# Patient Record
Sex: FEMALE | Race: WHITE | Employment: FULL TIME | URBAN - METROPOLITAN AREA
[De-identification: names, ages, dates, MRNs, and addresses within clinical notes are randomized per-mention and may not be internally consistent; named-entity substitution may affect disease eponyms.]

---

## 2017-05-10 ENCOUNTER — OFFICE VISIT (OUTPATIENT)
Dept: FAMILY MEDICINE CLINIC | Age: 42
End: 2017-05-10

## 2017-05-10 VITALS
OXYGEN SATURATION: 98 % | DIASTOLIC BLOOD PRESSURE: 76 MMHG | SYSTOLIC BLOOD PRESSURE: 128 MMHG | HEIGHT: 60 IN | BODY MASS INDEX: 40.44 KG/M2 | HEART RATE: 116 BPM | WEIGHT: 206 LBS

## 2017-05-10 DIAGNOSIS — Z00.00 WELL ADULT EXAM: Primary | ICD-10-CM

## 2017-05-10 DIAGNOSIS — K64.9 HEMORRHOIDS, UNSPECIFIED HEMORRHOID TYPE: ICD-10-CM

## 2017-05-10 DIAGNOSIS — Z82.49 FAMILY HISTORY OF PREMATURE CAD: ICD-10-CM

## 2017-05-10 PROCEDURE — 99395 PREV VISIT EST AGE 18-39: CPT | Performed by: FAMILY MEDICINE

## 2017-09-22 ENCOUNTER — TELEPHONE (OUTPATIENT)
Dept: FAMILY MEDICINE CLINIC | Age: 42
End: 2017-09-22

## 2018-01-05 ENCOUNTER — OFFICE VISIT (OUTPATIENT)
Dept: FAMILY MEDICINE CLINIC | Age: 43
End: 2018-01-05

## 2018-01-05 VITALS
DIASTOLIC BLOOD PRESSURE: 80 MMHG | OXYGEN SATURATION: 98 % | HEART RATE: 96 BPM | WEIGHT: 204 LBS | BODY MASS INDEX: 40.51 KG/M2 | SYSTOLIC BLOOD PRESSURE: 124 MMHG | TEMPERATURE: 97.8 F

## 2018-01-05 DIAGNOSIS — B96.89 ACUTE BACTERIAL SINUSITIS: Primary | ICD-10-CM

## 2018-01-05 DIAGNOSIS — F17.200 TOBACCO USE DISORDER: ICD-10-CM

## 2018-01-05 DIAGNOSIS — J01.90 ACUTE BACTERIAL SINUSITIS: Primary | ICD-10-CM

## 2018-01-05 DIAGNOSIS — J40 BRONCHITIS: ICD-10-CM

## 2018-01-05 PROCEDURE — G8427 DOCREV CUR MEDS BY ELIG CLIN: HCPCS | Performed by: FAMILY MEDICINE

## 2018-01-05 PROCEDURE — G8484 FLU IMMUNIZE NO ADMIN: HCPCS | Performed by: FAMILY MEDICINE

## 2018-01-05 PROCEDURE — 99213 OFFICE O/P EST LOW 20 MIN: CPT | Performed by: FAMILY MEDICINE

## 2018-01-05 PROCEDURE — G8417 CALC BMI ABV UP PARAM F/U: HCPCS | Performed by: FAMILY MEDICINE

## 2018-01-05 PROCEDURE — 4004F PT TOBACCO SCREEN RCVD TLK: CPT | Performed by: FAMILY MEDICINE

## 2018-01-05 RX ORDER — AMOXICILLIN 500 MG/1
1000 TABLET, FILM COATED ORAL 2 TIMES DAILY
Qty: 40 TABLET | Refills: 0 | Status: SHIPPED | OUTPATIENT
Start: 2018-01-05 | End: 2018-01-15

## 2018-01-26 LAB
HCT VFR BLD CALC: 41.9 % (ref 36–48)
HEMOGLOBIN: 13.9 G/DL (ref 12–16)
MCH RBC QN AUTO: 30.7 PG (ref 26–34)
MCHC RBC AUTO-ENTMCNC: 33.3 G/DL (ref 31–36)
MCV RBC AUTO: 92.1 FL (ref 80–100)
PDW BLD-RTO: 14.3 % (ref 12.4–15.4)
PLATELET # BLD: 281 K/UL (ref 135–450)
PMV BLD AUTO: 8.3 FL (ref 5–10.5)
RBC # BLD: 4.55 M/UL (ref 4–5.2)
TSH SERPL DL<=0.05 MIU/L-ACNC: 1.3 UIU/ML (ref 0.27–4.2)
WBC # BLD: 7.4 K/UL (ref 4–11)

## 2018-02-01 LAB
HPV COMMENT: NORMAL
HPV TYPE 16: NOT DETECTED
HPV TYPE 18: NOT DETECTED
HPVOH (OTHER TYPES): NOT DETECTED

## 2018-03-26 ENCOUNTER — HOSPITAL ENCOUNTER (OUTPATIENT)
Dept: OTHER | Age: 43
Discharge: HOME | End: 2018-03-26
Attending: OBSTETRICS & GYNECOLOGY | Admitting: OBSTETRICS & GYNECOLOGY

## 2018-03-26 LAB
A/G RATIO: 1.6 (ref 1.1–2.2)
ABO/RH: NORMAL
ALBUMIN SERPL-MCNC: 4 G/DL (ref 3.4–5)
ALP BLD-CCNC: 60 U/L (ref 40–129)
ALT SERPL-CCNC: 11 U/L (ref 10–40)
ANION GAP SERPL CALCULATED.3IONS-SCNC: 12 MMOL/L (ref 3–16)
ANTIBODY SCREEN: NORMAL
AST SERPL-CCNC: 11 U/L (ref 15–37)
BASOPHILS ABSOLUTE: 0.1 K/UL (ref 0–0.2)
BASOPHILS RELATIVE PERCENT: 0.8 %
BILIRUB SERPL-MCNC: 0.3 MG/DL (ref 0–1)
BUN BLDV-MCNC: 11 MG/DL (ref 7–20)
CALCIUM SERPL-MCNC: 8.8 MG/DL (ref 8.3–10.6)
CHLORIDE BLD-SCNC: 101 MMOL/L (ref 99–110)
CO2: 27 MMOL/L (ref 21–32)
CREAT SERPL-MCNC: <0.5 MG/DL (ref 0.6–1.1)
EKG ATRIAL RATE: 73 BPM
EKG DIAGNOSIS: NORMAL
EKG P AXIS: 37 DEGREES
EKG P-R INTERVAL: 154 MS
EKG Q-T INTERVAL: 386 MS
EKG QRS DURATION: 82 MS
EKG QTC CALCULATION (BAZETT): 425 MS
EKG R AXIS: 27 DEGREES
EKG T AXIS: 33 DEGREES
EKG VENTRICULAR RATE: 73 BPM
EOSINOPHILS ABSOLUTE: 0.2 K/UL (ref 0–0.6)
EOSINOPHILS RELATIVE PERCENT: 1.7 %
GFR AFRICAN AMERICAN: >60
GFR NON-AFRICAN AMERICAN: >60
GLOBULIN: 2.5 G/DL
GLUCOSE BLD-MCNC: 83 MG/DL (ref 70–99)
HCT VFR BLD CALC: 40.6 % (ref 36–48)
HEMOGLOBIN: 13.6 G/DL (ref 12–16)
LYMPHOCYTES ABSOLUTE: 2.1 K/UL (ref 1–5.1)
LYMPHOCYTES RELATIVE PERCENT: 23.1 %
MCH RBC QN AUTO: 31 PG (ref 26–34)
MCHC RBC AUTO-ENTMCNC: 33.6 G/DL (ref 31–36)
MCV RBC AUTO: 92.2 FL (ref 80–100)
MONOCYTES ABSOLUTE: 0.6 K/UL (ref 0–1.3)
MONOCYTES RELATIVE PERCENT: 7 %
NEUTROPHILS ABSOLUTE: 6.2 K/UL (ref 1.7–7.7)
NEUTROPHILS RELATIVE PERCENT: 67.4 %
PDW BLD-RTO: 14.2 % (ref 12.4–15.4)
PLATELET # BLD: 278 K/UL (ref 135–450)
PMV BLD AUTO: 8.8 FL (ref 5–10.5)
POTASSIUM SERPL-SCNC: 4.2 MMOL/L (ref 3.5–5.1)
RBC # BLD: 4.4 M/UL (ref 4–5.2)
SODIUM BLD-SCNC: 140 MMOL/L (ref 136–145)
TOTAL PROTEIN: 6.5 G/DL (ref 6.4–8.2)
WBC # BLD: 9.2 K/UL (ref 4–11)

## 2018-03-26 PROCEDURE — 93010 ELECTROCARDIOGRAM REPORT: CPT | Performed by: INTERNAL MEDICINE

## 2018-04-05 PROBLEM — N94.89 ADNEXAL MASS: Status: ACTIVE | Noted: 2018-04-05

## 2018-08-28 ENCOUNTER — TELEPHONE (OUTPATIENT)
Dept: FAMILY MEDICINE CLINIC | Age: 43
End: 2018-08-28

## 2018-08-29 ENCOUNTER — OFFICE VISIT (OUTPATIENT)
Dept: FAMILY MEDICINE CLINIC | Age: 43
End: 2018-08-29

## 2018-08-29 VITALS
TEMPERATURE: 98.4 F | SYSTOLIC BLOOD PRESSURE: 110 MMHG | OXYGEN SATURATION: 98 % | HEART RATE: 88 BPM | DIASTOLIC BLOOD PRESSURE: 80 MMHG | WEIGHT: 192.4 LBS | BODY MASS INDEX: 38.86 KG/M2

## 2018-08-29 DIAGNOSIS — J40 BRONCHITIS: Primary | ICD-10-CM

## 2018-08-29 DIAGNOSIS — F17.200 TOBACCO USE DISORDER: ICD-10-CM

## 2018-08-29 PROCEDURE — G8417 CALC BMI ABV UP PARAM F/U: HCPCS | Performed by: FAMILY MEDICINE

## 2018-08-29 PROCEDURE — 4004F PT TOBACCO SCREEN RCVD TLK: CPT | Performed by: FAMILY MEDICINE

## 2018-08-29 PROCEDURE — 99213 OFFICE O/P EST LOW 20 MIN: CPT | Performed by: FAMILY MEDICINE

## 2018-08-29 PROCEDURE — G8427 DOCREV CUR MEDS BY ELIG CLIN: HCPCS | Performed by: FAMILY MEDICINE

## 2018-08-29 RX ORDER — NAPROXEN SODIUM 220 MG
2 TABLET ORAL 2 TIMES DAILY PRN
COMMUNITY

## 2018-08-29 RX ORDER — AZITHROMYCIN 250 MG/1
TABLET, FILM COATED ORAL
Qty: 1 PACKET | Refills: 0 | Status: SHIPPED | OUTPATIENT
Start: 2018-08-29 | End: 2018-09-02

## 2018-08-29 RX ORDER — GUAIFENESIN, PSEUDOEPHEDRINE HYDROCHLORIDE 600; 60 MG/1; MG/1
1 TABLET, EXTENDED RELEASE ORAL EVERY 12 HOURS
Qty: 20 TABLET | Refills: 1 | Status: SHIPPED | OUTPATIENT
Start: 2018-08-29 | End: 2018-09-08

## 2018-08-29 ASSESSMENT — ENCOUNTER SYMPTOMS
RHINORRHEA: 1
NAUSEA: 1
COUGH: 1
SHORTNESS OF BREATH: 1
SORE THROAT: 1
DIARRHEA: 1

## 2018-08-29 NOTE — PROGRESS NOTES
SUBJECTIVE:    Maggy Grubbs is a 38 y.o. female who presents for a follow up visit. Chief Complaint   Patient presents with    Cough     Pt presents today with head congestion, nasal drainage is a yellow color, productive cough with thick mucous, and sore throat. Symptoms started since Monday night. Cough   This is a recurrent problem. The current episode started 1 to 4 weeks ago. The problem has been gradually worsening. The cough is productive of purulent sputum. Associated symptoms include chest pain, ear pain, myalgias, nasal congestion, postnasal drip, rhinorrhea, a sore throat, shortness of breath and sweats. Pertinent negatives include no fever. Risk factors for lung disease include smoking/tobacco exposure. She has tried OTC cough suppressant (mucinex D) for the symptoms. The treatment provided mild relief. Patient's medications, allergies, past medical, surgical, social and family histories were reviewed and updated as appropriate.      Past Medical History:   Diagnosis Date    GERD (gastroesophageal reflux disease)     H/O myringotomy     w/tube placement     Sleep apnea syndrome     no c-pap    Tobacco use disorder      Past Surgical History:   Procedure Laterality Date    ADENOIDECTOMY      DILATION AND CURETTAGE OF UTERUS  11/30/11    DILATION AND CURETTAGE OF UTERUS  5/24/12    HYSTERECTOMY, TOTAL ABDOMINAL  04/05/2018    total hysterectomy, BSO    TYMPANOSTOMY TUBE PLACEMENT       Family History   Problem Relation Age of Onset    Heart Disease Father     High Blood Pressure Mother     High Cholesterol Mother     Cancer Other     Alcohol Abuse Other     Stroke Other     High Blood Pressure Other     Lung Cancer Maternal Grandmother         +smoker    Cancer Maternal Grandmother         lung, smoker     Social History     Social History    Marital status:      Spouse name: N/A    Number of children: 2    Years of education: N/A     Occupational History    Normocephalic and atraumatic. Right Ear: External ear normal.   Left Ear: External ear normal.   Nose: Rhinorrhea present. Right sinus exhibits no maxillary sinus tenderness and no frontal sinus tenderness. Left sinus exhibits no maxillary sinus tenderness and no frontal sinus tenderness. Mouth/Throat: Oropharynx is clear and moist.   Eyes: Conjunctivae and EOM are normal. Right eye exhibits no discharge. Neck: Normal range of motion. Neck supple. No JVD present. No tracheal deviation present. No thyromegaly present. Cardiovascular: Normal rate, regular rhythm and normal heart sounds. Pulmonary/Chest: Effort normal and breath sounds normal. No respiratory distress. She has no rales. Lymphadenopathy:     She has no cervical adenopathy. Neurological: She is alert and oriented to person, place, and time. Skin: Skin is warm and dry. Psychiatric: She has a normal mood and affect. ASSESSMENT/PLAN:    Claudetta Doing was seen today for cough. Diagnoses and all orders for this visit:    Bronchitis  -     azithromycin (ZITHROMAX Z-MECCA) 250 MG tablet; Take 2 tablets (500 mg) on Day 1, and then take 1 tablet (250 mg) on days 2 through 5.  -     HYDROcodone-homatropine (HYCODAN) 5-1.5 MG/5ML syrup; Take 2.5 mLs by mouth every 6 hours as needed (cough) for up to 7 days. .  -     pseudoephedrine-guaiFENesin (MUCINEX D)  MG per extended release tablet; Take 1 tablet by mouth every 12 hours for 10 days    Tobacco use disorder    Encouraged to quit     Return if symptoms worsen or fail to improve. Please note portions of this note were completed with a voice recognition program.  Efforts were made to edit the dictations but occasionally words are mis-transcribed.

## 2018-08-30 ENCOUNTER — TELEPHONE (OUTPATIENT)
Dept: RHEUMATOLOGY | Age: 43
End: 2018-08-30

## 2018-08-30 NOTE — TELEPHONE ENCOUNTER
PA submitted for Mucinex D 60-600MG OR TB12 on CMM  Key: [id number] PA Case ID: [id number]     Pending review

## 2018-08-31 NOTE — TELEPHONE ENCOUNTER
PA DENIAL received for this medication. States this medication is not a covered benefit under patient's prescription plan. Denial attached. Please advise patient.

## 2019-08-20 ENCOUNTER — OFFICE VISIT (OUTPATIENT)
Dept: FAMILY MEDICINE CLINIC | Age: 44
End: 2019-08-20
Payer: COMMERCIAL

## 2019-08-20 VITALS
OXYGEN SATURATION: 98 % | DIASTOLIC BLOOD PRESSURE: 80 MMHG | BODY MASS INDEX: 41.61 KG/M2 | WEIGHT: 206 LBS | TEMPERATURE: 97.5 F | HEART RATE: 89 BPM | SYSTOLIC BLOOD PRESSURE: 130 MMHG

## 2019-08-20 DIAGNOSIS — B96.89 ACUTE BACTERIAL SINUSITIS: Primary | ICD-10-CM

## 2019-08-20 DIAGNOSIS — J01.90 ACUTE BACTERIAL SINUSITIS: Primary | ICD-10-CM

## 2019-08-20 PROCEDURE — 99213 OFFICE O/P EST LOW 20 MIN: CPT | Performed by: FAMILY MEDICINE

## 2019-08-20 PROCEDURE — 4004F PT TOBACCO SCREEN RCVD TLK: CPT | Performed by: FAMILY MEDICINE

## 2019-08-20 PROCEDURE — G8427 DOCREV CUR MEDS BY ELIG CLIN: HCPCS | Performed by: FAMILY MEDICINE

## 2019-08-20 PROCEDURE — G8417 CALC BMI ABV UP PARAM F/U: HCPCS | Performed by: FAMILY MEDICINE

## 2019-08-20 RX ORDER — GUAIFENESIN, PSEUDOEPHEDRINE HYDROCHLORIDE 600; 60 MG/1; MG/1
1 TABLET, EXTENDED RELEASE ORAL EVERY 12 HOURS
Qty: 20 TABLET | Refills: 1 | Status: SHIPPED | OUTPATIENT
Start: 2019-08-20 | End: 2019-08-30

## 2019-08-20 RX ORDER — CEFDINIR 300 MG/1
300 CAPSULE ORAL 2 TIMES DAILY
Qty: 20 CAPSULE | Refills: 0 | Status: SHIPPED | OUTPATIENT
Start: 2019-08-20 | End: 2019-08-30

## 2019-08-20 ASSESSMENT — ENCOUNTER SYMPTOMS
RHINORRHEA: 1
SORE THROAT: 1
COUGH: 1

## 2019-11-21 ENCOUNTER — HOSPITAL ENCOUNTER (OUTPATIENT)
Dept: CT IMAGING | Age: 44
Discharge: HOME | End: 2019-11-21
Payer: COMMERCIAL

## 2019-11-21 ENCOUNTER — OFFICE VISIT (OUTPATIENT)
Dept: FAMILY MEDICINE CLINIC | Age: 44
End: 2019-11-21
Payer: COMMERCIAL

## 2019-11-21 ENCOUNTER — TELEPHONE (OUTPATIENT)
Dept: FAMILY MEDICINE CLINIC | Age: 44
End: 2019-11-21

## 2019-11-21 VITALS
BODY MASS INDEX: 41.2 KG/M2 | OXYGEN SATURATION: 98 % | DIASTOLIC BLOOD PRESSURE: 78 MMHG | HEART RATE: 92 BPM | WEIGHT: 204 LBS | SYSTOLIC BLOOD PRESSURE: 110 MMHG | TEMPERATURE: 98.1 F

## 2019-11-21 DIAGNOSIS — R42 DIZZINESS: ICD-10-CM

## 2019-11-21 DIAGNOSIS — G43.011 INTRACTABLE MIGRAINE WITHOUT AURA AND WITH STATUS MIGRAINOSUS: Primary | ICD-10-CM

## 2019-11-21 DIAGNOSIS — Z13.220 ENCOUNTER FOR LIPID SCREENING FOR CARDIOVASCULAR DISEASE: ICD-10-CM

## 2019-11-21 DIAGNOSIS — Z13.6 ENCOUNTER FOR LIPID SCREENING FOR CARDIOVASCULAR DISEASE: ICD-10-CM

## 2019-11-21 DIAGNOSIS — Z13.1 SCREENING FOR DIABETES MELLITUS: ICD-10-CM

## 2019-11-21 DIAGNOSIS — G43.011 INTRACTABLE MIGRAINE WITHOUT AURA AND WITH STATUS MIGRAINOSUS: ICD-10-CM

## 2019-11-21 PROCEDURE — 99214 OFFICE O/P EST MOD 30 MIN: CPT | Performed by: FAMILY MEDICINE

## 2019-11-21 PROCEDURE — 70450 CT HEAD/BRAIN W/O DYE: CPT

## 2019-11-21 RX ORDER — MECLIZINE HYDROCHLORIDE 25 MG/1
25 TABLET ORAL 3 TIMES DAILY PRN
Qty: 60 TABLET | Refills: 3 | Status: SHIPPED | OUTPATIENT
Start: 2019-11-21 | End: 2021-05-12 | Stop reason: SDUPTHER

## 2019-11-21 ASSESSMENT — PATIENT HEALTH QUESTIONNAIRE - PHQ9
2. FEELING DOWN, DEPRESSED OR HOPELESS: 0
SUM OF ALL RESPONSES TO PHQ9 QUESTIONS 1 & 2: 0
1. LITTLE INTEREST OR PLEASURE IN DOING THINGS: 0
SUM OF ALL RESPONSES TO PHQ QUESTIONS 1-9: 0
SUM OF ALL RESPONSES TO PHQ QUESTIONS 1-9: 0

## 2019-11-22 ENCOUNTER — HOSPITAL ENCOUNTER (OUTPATIENT)
Age: 44
Discharge: HOME | End: 2019-11-22
Payer: COMMERCIAL

## 2019-11-22 DIAGNOSIS — Z13.1 SCREENING FOR DIABETES MELLITUS: ICD-10-CM

## 2019-11-22 DIAGNOSIS — Z13.220 ENCOUNTER FOR LIPID SCREENING FOR CARDIOVASCULAR DISEASE: ICD-10-CM

## 2019-11-22 DIAGNOSIS — Z13.6 ENCOUNTER FOR LIPID SCREENING FOR CARDIOVASCULAR DISEASE: ICD-10-CM

## 2019-11-22 LAB
CHOLESTEROL, TOTAL: 236 MG/DL (ref 0–199)
GLUCOSE BLD-MCNC: 85 MG/DL (ref 70–99)
HDLC SERPL-MCNC: 45 MG/DL (ref 40–60)
LDL CHOLESTEROL CALCULATED: 170 MG/DL
TRIGL SERPL-MCNC: 106 MG/DL (ref 0–150)
VLDLC SERPL CALC-MCNC: 21 MG/DL

## 2019-11-22 PROCEDURE — 82947 ASSAY GLUCOSE BLOOD QUANT: CPT

## 2019-11-22 PROCEDURE — 80061 LIPID PANEL: CPT

## 2019-11-22 PROCEDURE — 36415 COLL VENOUS BLD VENIPUNCTURE: CPT

## 2019-11-25 ENCOUNTER — OFFICE VISIT (OUTPATIENT)
Dept: FAMILY MEDICINE CLINIC | Age: 44
End: 2019-11-25
Payer: COMMERCIAL

## 2019-11-25 VITALS
HEIGHT: 60 IN | BODY MASS INDEX: 40.44 KG/M2 | WEIGHT: 206 LBS | OXYGEN SATURATION: 98 % | DIASTOLIC BLOOD PRESSURE: 76 MMHG | SYSTOLIC BLOOD PRESSURE: 112 MMHG | HEART RATE: 103 BPM

## 2019-11-25 DIAGNOSIS — Z00.00 WELL ADULT EXAM: Primary | ICD-10-CM

## 2019-11-25 DIAGNOSIS — G43.009 MIGRAINE WITHOUT AURA AND WITHOUT STATUS MIGRAINOSUS, NOT INTRACTABLE: ICD-10-CM

## 2019-11-25 DIAGNOSIS — E78.00 PURE HYPERCHOLESTEROLEMIA: ICD-10-CM

## 2019-11-25 PROBLEM — G43.011 INTRACTABLE MIGRAINE WITHOUT AURA AND WITH STATUS MIGRAINOSUS: Status: ACTIVE | Noted: 2019-11-25

## 2019-11-25 PROCEDURE — G8484 FLU IMMUNIZE NO ADMIN: HCPCS | Performed by: FAMILY MEDICINE

## 2019-11-25 PROCEDURE — 99396 PREV VISIT EST AGE 40-64: CPT | Performed by: FAMILY MEDICINE

## 2019-11-25 RX ORDER — ATORVASTATIN CALCIUM 40 MG/1
40 TABLET, FILM COATED ORAL DAILY
Qty: 30 TABLET | Refills: 3 | Status: SHIPPED | OUTPATIENT
Start: 2019-11-25 | End: 2020-01-28 | Stop reason: DRUGHIGH

## 2020-01-28 ENCOUNTER — OFFICE VISIT (OUTPATIENT)
Dept: FAMILY MEDICINE CLINIC | Age: 45
End: 2020-01-28
Payer: COMMERCIAL

## 2020-01-28 VITALS
DIASTOLIC BLOOD PRESSURE: 72 MMHG | BODY MASS INDEX: 39.98 KG/M2 | HEART RATE: 88 BPM | WEIGHT: 203 LBS | OXYGEN SATURATION: 98 % | TEMPERATURE: 98.7 F | SYSTOLIC BLOOD PRESSURE: 112 MMHG

## 2020-01-28 PROCEDURE — G8427 DOCREV CUR MEDS BY ELIG CLIN: HCPCS | Performed by: FAMILY MEDICINE

## 2020-01-28 PROCEDURE — G8484 FLU IMMUNIZE NO ADMIN: HCPCS | Performed by: FAMILY MEDICINE

## 2020-01-28 PROCEDURE — 99213 OFFICE O/P EST LOW 20 MIN: CPT | Performed by: FAMILY MEDICINE

## 2020-01-28 PROCEDURE — 87880 STREP A ASSAY W/OPTIC: CPT | Performed by: FAMILY MEDICINE

## 2020-01-28 PROCEDURE — 4004F PT TOBACCO SCREEN RCVD TLK: CPT | Performed by: FAMILY MEDICINE

## 2020-01-28 PROCEDURE — G8417 CALC BMI ABV UP PARAM F/U: HCPCS | Performed by: FAMILY MEDICINE

## 2020-01-28 RX ORDER — CEPHALEXIN 500 MG/1
500 CAPSULE ORAL 3 TIMES DAILY
Qty: 30 CAPSULE | Refills: 0 | Status: SHIPPED | OUTPATIENT
Start: 2020-01-28 | End: 2020-02-07

## 2020-01-28 RX ORDER — ATORVASTATIN CALCIUM 40 MG/1
20 TABLET, FILM COATED ORAL DAILY
Qty: 30 TABLET | Refills: 3 | Status: SHIPPED
Start: 2020-01-28 | End: 2020-11-03 | Stop reason: ALTCHOICE

## 2020-01-28 NOTE — LETTER
[address] [place] [address]  Phone: [phone]  Fax: [phone]    Di Blood MD        January 28, 2020     Patient: Jan Herrera   YOB: 1975   Date of Visit: 1/28/2020       To Whom it May Concern:    Jan Herrera was seen in my clinic on 1/28/2020. She may return to work on 1/30/2020. If you have any questions or concerns, please don't hesitate to call.     Sincerely,         Di Blood MD

## 2020-01-28 NOTE — PROGRESS NOTES
Pt is here with   Chief Complaint   Patient presents with    Pharyngitis    for  one day. Patient woke up with sore throat yesterday and post-nasal drainage. Reports her sore throat got progressively worse throughout the day and as well as this morning. States it is difficult to swallow and she has not been eating due to the pain. Reports the pain radiates to both ears and she has tenderness in her anterior neck. Patient also complaining of slight headache, dry cough, and fatigue. Reports she wasn't able to sleep last night due to the pain. Patient tried chloraseptic spray, cough drops, and aleve without relief. 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 Patient also complaining of muscle aches in her hips and legs that has become progressively worse over the last month. Patient believes is is due to  Atorvastatin, which she began taking in 11/19. Pt is complaining of:    Cough: Yes  Sputum: No, Color: n/a  Nasal Congestion: No  Nasal Discharge: Yes, Color: clear  Ear Pain: Yes  Sore Throat: Yes  Chest Pain/Tightness: No  SOB: No  Wheezing: No  Fever: No  Headache/sinus pressure: Yes- headache  Fatigue: Yes  Muscle aches: Yes - patients thinks this is due to atorvastatin    Symptoms are are worsening. Has tried NSAID. Treatments have been been ineffective. Social History     Tobacco Use   Smoking Status Current Every Day Smoker    Packs/day: 0.50    Years: 14.00    Pack years: 7.00    Types: Cigarettes   Smokeless Tobacco Never Used     Body mass index is 39.98 kg/m².   Vitals:    01/28/20 1510   BP: 112/72   Site: Left Upper Arm   Position: Sitting   Cuff Size: Large Adult   Pulse: 88   Temp: 98.7 °F (37.1 °C)   TempSrc: Tympanic   SpO2: 98%   Weight: 203 lb (92.1 kg)     Wt Readings from Last 3 Encounters:   01/28/20 203 lb (92.1 kg)   11/25/19 206 lb (93.4 kg)   11/21/19 204 lb (92.5 kg)     PHQ score: No data recorded    GENERAL:Alert and oriented x 4 , affect appropriate, obese and looks ill, well hydrated, well developed. HEENT: erythematous and swollen oropharynx and tonsils with tonsilar exudate; left external ear canal with mild erythema, tympanic membrane visible, gray; right external ear canal without erythema, tympanic membrane visible, gray; moist mucous membranes   NECK: anterior cervical tenderness upon palpation, supple and without mass, no thyromegaly or thyroid nodules, no cervical lymphadenopathy  LUNG:clear to auscultation bilaterally with normal respiratory effort  CV: Normal heart sounds, regular rate and rhythm without murmurs        ASSESSMENT AND PLAN:   Emily Vizcaino was seen today for pharyngitis. Diagnoses and all orders for this visit:    Strep pharyngitis  -     POCT rapid strep A  - Positive POCT rapid strep A  - Start Keflex 500 mg TID for 10 days  - Return to clinic if symptoms persist or worsen     Myalgia  - Stop atorvastatin for 2 weeks, then re-start at 20mg PO daily  - Monitor symptoms and return to clinic if symptoms persist or worsen    Other orders  -     cephALEXin (KEFLEX) 500 MG capsule;  Take 1 capsule by mouth 3 times daily for 10 days      Note per Bandar Strickland,  MS3 student with corrections and edits per Roslyn Kimball MD.  I agree with entirety of note and was present and performed history and physical.  I also confirm that the note above accurately reflects all work, treatment, procedures, and medical decision making performed by me, Roslyn Kimball MD

## 2020-01-28 NOTE — PROGRESS NOTES
Pt is here with   Chief Complaint   Patient presents with    Pharyngitis    for  1 day. Pt is complaining of:    Cough: Yes  Sputum: No, Color:   Nasal Congestion: Yes  Nasal Discharge: No Color:   Ear Pain: Yes  Sore Throat: Yes  Chest Pain/Tightness: No  SOB: No  Wheezing: No  Fever: has not checked, has been chilled  Headache/sinus pressure: Yes  Fatigue: Yes  Muscle aches: Yes, feels that is from atorvastatin    Symptoms are are worsening. Has tried cough drops and chloraseptic . Treatments have been been ineffective. Social History     Tobacco Use   Smoking Status Current Every Day Smoker    Packs/day: 0.50    Years: 14.00    Pack years: 7.00    Types: Cigarettes   Smokeless Tobacco Never Used     No Known Allergies    There were no vitals filed for this visit. Wt Readings from Last 3 Encounters:   11/25/19 206 lb (93.4 kg)   11/21/19 204 lb (92.5 kg)   08/20/19 206 lb (93.4 kg)     There is no height or weight on file to calculate BMI. Alert and oriented x 4 NAD, {gyn gen appearance:[id number]}, well hydrated, well developed.   Left TM ***, canal *** and pinna nl  Right TM ***, canal *** and pinna nl  No nodes neck  Nares red and congested, *** drainage  OP mild erythema, no exudate, no swelling  Lung clear with good air movement and effort  CV RRR no M

## 2020-02-12 ENCOUNTER — TELEPHONE (OUTPATIENT)
Dept: FAMILY MEDICINE CLINIC | Age: 45
End: 2020-02-12

## 2020-02-12 ENCOUNTER — OFFICE VISIT (OUTPATIENT)
Dept: FAMILY MEDICINE CLINIC | Age: 45
End: 2020-02-12
Payer: COMMERCIAL

## 2020-02-12 VITALS
BODY MASS INDEX: 40.18 KG/M2 | WEIGHT: 204 LBS | HEART RATE: 119 BPM | SYSTOLIC BLOOD PRESSURE: 110 MMHG | OXYGEN SATURATION: 98 % | TEMPERATURE: 97.2 F | DIASTOLIC BLOOD PRESSURE: 78 MMHG

## 2020-02-12 LAB
INFLUENZA A ANTIGEN, POC: NEGATIVE
INFLUENZA B ANTIGEN, POC: NEGATIVE

## 2020-02-12 PROCEDURE — 4004F PT TOBACCO SCREEN RCVD TLK: CPT | Performed by: FAMILY MEDICINE

## 2020-02-12 PROCEDURE — G8427 DOCREV CUR MEDS BY ELIG CLIN: HCPCS | Performed by: FAMILY MEDICINE

## 2020-02-12 PROCEDURE — 99213 OFFICE O/P EST LOW 20 MIN: CPT | Performed by: FAMILY MEDICINE

## 2020-02-12 PROCEDURE — G8484 FLU IMMUNIZE NO ADMIN: HCPCS | Performed by: FAMILY MEDICINE

## 2020-02-12 PROCEDURE — 87804 INFLUENZA ASSAY W/OPTIC: CPT | Performed by: FAMILY MEDICINE

## 2020-02-12 PROCEDURE — G8417 CALC BMI ABV UP PARAM F/U: HCPCS | Performed by: FAMILY MEDICINE

## 2020-02-12 RX ORDER — AMOXICILLIN AND CLAVULANATE POTASSIUM 500; 125 MG/1; MG/1
1 TABLET, FILM COATED ORAL 3 TIMES DAILY
Qty: 30 TABLET | Refills: 0 | Status: SHIPPED | OUTPATIENT
Start: 2020-02-12 | End: 2020-02-22

## 2020-02-12 NOTE — PATIENT INSTRUCTIONS
of distilled water. If you make your own, fill a bulb syringe with the solution, insert the tip into your nostril, and squeeze gently. Sonna Player your nose. · Put a hot, wet towel or a warm gel pack on your face 3 or 4 times a day for 5 to 10 minutes each time. · Try a decongestant nasal spray like oxymetazoline (Afrin). Do not use it for more than 3 days in a row. Using it for more than 3 days can make your congestion worse. When should you call for help? Call your doctor now or seek immediate medical care if:    · You have new or worse swelling or redness in your face or around your eyes.     · You have a new or higher fever.    Watch closely for changes in your health, and be sure to contact your doctor if:    · You have new or worse facial pain.     · The mucus from your nose becomes thicker (like pus) or has new blood in it.     · You are not getting better as expected. Where can you learn more? Go to [email or web address] [email or web address] and sign in to your [employer] account. Enter F240 in the [employer] box to learn more about \"Sinusitis: Care Instructions. \"     If you do not have an account, please click on the \"Sign Up Now\" link. Current as of: July 28, 2019  Content Version: 12.3  © 8743-8156 Healthwise, Incorporated. Care instructions adapted under license by [facility] ([facility]). If you have questions about a medical condition or this instruction, always ask your healthcare professional. [place] [address] any warranty or liability for your use of this information. Stopping Smoking: Care Instructions  Your Care Instructions  Cigarette smokers crave the nicotine in cigarettes. Giving it up is much harder than simply changing a habit. Your body has to stop craving the nicotine. It is hard to quit, but you can do it. There are many tools that people use to quit smoking. You may find that combining tools works best for you. There are several steps to quitting.  First you get ready to quit. Then you get support to help you. After that, you learn new skills and behaviors to become a nonsmoker. For many people, a necessary step is getting and using medicine. Your doctor will help you set up the plan that best meets your needs. You may want to attend a smoking cessation program to help you quit smoking. When you choose a program, look for one that has proven success. Ask your doctor for ideas. You will greatly increase your chances of success if you take medicine as well as get counseling or join a cessation program.  Some of the changes you feel when you first quit tobacco are uncomfortable. Your body will miss the nicotine at first, and you may feel short-tempered and grumpy. You may have trouble sleeping or concentrating. Medicine can help you deal with these symptoms. You may struggle with changing your smoking habits and rituals. The last step is the tricky one: Be prepared for the smoking urge to continue for a time. This is a lot to deal with, but keep at it. You will feel better. Follow-up care is a key part of your treatment and safety. Be sure to make and go to all appointments, and call your doctor if you are having problems. It's also a good idea to know your test results and keep a list of the medicines you take. How can you care for yourself at home? · Ask your family, friends, and coworkers for support. You have a better chance of quitting if you have help and support. · Join a support group, such as Nicotine Anonymous, for people who are trying to quit smoking. · Consider signing up for a smoking cessation program, such as the American Lung Association's Freedom from Smoking program.  · Get text messaging support. Go to the website at www.smokefree. gov to sign up for the [facility] program.  · Set a quit date. Pick your date carefully so that it is not right in the middle of a big deadline or stressful time. Once you quit, do not even take a puff.  Get rid of all ashtrays and lighters after your last cigarette. Clean your house and your clothes so that they do not smell of smoke. · Learn how to be a nonsmoker. Think about ways you can avoid those things that make you reach for a cigarette. ? Avoid situations that put you at greatest risk for smoking. For some people, it is hard to have a drink with friends without smoking. For others, they might skip a coffee break with coworkers who smoke. ? Change your daily routine. Take a different route to work or eat a meal in a different place. · Cut down on stress. Calm yourself or release tension by doing an activity you enjoy, such as reading a book, taking a hot bath, or gardening. · Talk to your doctor or pharmacist about nicotine replacement therapy, which replaces the nicotine in your body. You still get nicotine but you do not use tobacco. Nicotine replacement products help you slowly reduce the amount of nicotine you need. These products come in several forms, many of them available over-the-counter:  ? Nicotine patches  ? Nicotine gum and lozenges  ? Nicotine inhaler  · Ask your doctor about bupropion (Wellbutrin) or varenicline (Chantix), which are prescription medicines. They do not contain nicotine. They help you by reducing withdrawal symptoms, such as stress and anxiety. · Some people find hypnosis, acupuncture, and massage helpful for ending the smoking habit. · Eat a healthy diet and get regular exercise. Having healthy habits will help your body move past its craving for nicotine. · Be prepared to keep trying. Most people are not successful the first few times they try to quit. Do not get mad at yourself if you smoke again. Make a list of things you learned and think about when you want to try again, such as next week, next month, or next year. Where can you learn more? Go to https://adithya.GMG33. org and sign in to your [employer] account.  Enter V886 in the [employer] box to learn

## 2020-03-26 ENCOUNTER — TELEPHONE (OUTPATIENT)
Dept: FAMILY MEDICINE CLINIC | Age: 45
End: 2020-03-26

## 2020-03-26 ENCOUNTER — NURSE TRIAGE (OUTPATIENT)
Dept: OTHER | Facility: CLINIC | Age: 45
End: 2020-03-26

## 2020-03-27 ENCOUNTER — OFFICE VISIT (OUTPATIENT)
Dept: PRIMARY CARE CLINIC | Age: 45
End: 2020-03-27
Payer: COMMERCIAL

## 2020-03-27 VITALS — OXYGEN SATURATION: 98 % | HEART RATE: 102 BPM | TEMPERATURE: 99.3 F

## 2020-03-27 LAB
INFLUENZA A ANTIGEN, POC: NEGATIVE
INFLUENZA B ANTIGEN, POC: NEGATIVE

## 2020-03-27 PROCEDURE — 87804 INFLUENZA ASSAY W/OPTIC: CPT | Performed by: NURSE PRACTITIONER

## 2020-03-27 PROCEDURE — 99212 OFFICE O/P EST SF 10 MIN: CPT | Performed by: NURSE PRACTITIONER

## 2020-03-27 RX ORDER — ONDANSETRON HYDROCHLORIDE 8 MG/1
8 TABLET, FILM COATED ORAL EVERY 8 HOURS PRN
Qty: 15 TABLET | Refills: 0 | Status: SHIPPED | OUTPATIENT
Start: 2020-03-27 | End: 2020-04-01

## 2020-03-27 NOTE — PROGRESS NOTES
Preventing the Spread of Coronavirus Disease 2019 in Homes and Residential Communities   For the most recent information go to Dittoaners.fi    Prevention steps for People with confirmed or suspected COVID-19 (including persons under investigation) who do not need to be hospitalized  and   People with confirmed COVID-19 who were hospitalized and determined to be medically stable to go home    Your healthcare provider and public health staff will evaluate whether you can be cared for at home. If it is determined that you do not need to be hospitalized and can be isolated at home, you will be monitored by staff from your local or state health department. You should follow the prevention steps below until a healthcare provider or local or state health department says you can return to your normal activities. Stay home except to get medical care  People who are mildly ill with COVID-19 are able to isolate at home during their illness. You should restrict activities outside your home, except for getting medical care. Do not go to work, school, or public areas. Avoid using public transportation, ride-sharing, or taxis. Separate yourself from other people and animals in your home  People: As much as possible, you should stay in a specific room and away from other people in your home. Also, you should use a separate bathroom, if available. Animals: You should restrict contact with pets and other animals while you are sick with COVID-19, just like you would around other people. Although there have not been reports of pets or other animals becoming sick with COVID-19, it is still recommended that people sick with COVID-19 limit contact with animals until more information is known about the virus. When possible, have another member of your household care for your animals while you are sick.  If you are sick with COVID-19, avoid contact with your pet, including petting, snuggling, being kissed or licked, and sharing food. If you must care for your pet or be around animals while you are sick, wash your hands before and after you interact with pets and wear a facemask. Call ahead before visiting your doctor  If you have a medical appointment, call the healthcare provider and tell them that you have or may have COVID-19. This will help the healthcare providers office take steps to keep other people from getting infected or exposed. Wear a facemask  You should wear a facemask when you are around other people (e.g., sharing a room or vehicle) or pets and before you enter a healthcare providers office. If you are not able to wear a facemask (for example, because it causes trouble breathing), then people who live with you should not stay in the same room with you, or they should wear a facemask if they enter your room. Cover your coughs and sneezes  Cover your mouth and nose with a tissue when you cough or sneeze. Throw used tissues in a lined trash can. Immediately wash your hands with soap and water for at least 20 seconds or, if soap and water are not available, clean your hands with an alcohol-based hand  that contains at least 60% alcohol. Clean your hands often  Wash your hands often with soap and water for at least 20 seconds, especially after blowing your nose, coughing, or sneezing; going to the bathroom; and before eating or preparing food. If soap and water are not readily available, use an alcohol-based hand  with at least 60% alcohol, covering all surfaces of your hands and rubbing them together until they feel dry. Soap and water are the best option if hands are visibly dirty. Avoid touching your eyes, nose, and mouth with unwashed hands. Avoid sharing personal household items  You should not share dishes, drinking glasses, cups, eating utensils, towels, or bedding with other people or pets in your home.  After using these items, they should be washed thoroughly with soap and water. Clean all high-touch surfaces everyday  High touch surfaces include counters, tabletops, doorknobs, bathroom fixtures, toilets, phones, keyboards, tablets, and bedside tables. Also, clean any surfaces that may have blood, stool, or body fluids on them. Use a household cleaning spray or wipe, according to the label instructions. Labels contain instructions for safe and effective use of the cleaning product including precautions you should take when applying the product, such as wearing gloves and making sure you have good ventilation during use of the product. Monitor your symptoms  Seek prompt medical attention if your illness is worsening (e.g., difficulty breathing). Before seeking care, call your healthcare provider and tell them that you have, or are being evaluated for, COVID-19. Put on a facemask before you enter the facility. These steps will help the healthcare providers office to keep other people in the office or waiting room from getting infected or exposed. Ask your healthcare provider to call the local or [facility] health department. Persons who are placed under active monitoring or facilitated self-monitoring should follow instructions provided by their local health department or occupational health professionals, as appropriate. When working with your local health department check their available hours. If you have a medical emergency and need to call 911, notify the dispatch personnel that you have, or are being evaluated for COVID-19. If possible, put on a facemask before emergency medical services arrive. Discontinuing home isolation  Patients with confirmed COVID-19 should remain under home isolation precautions until the risk of secondary transmission to others is thought to be low.  The decision to discontinue home isolation precautions should be made on a case-by-case basis, in consultation with healthcare providers and state and [facility] health departments. Thank you for enrolling in [address] Please follow the instructions below to securely access your online medical record. Hashdoc allows you to send messages to your doctor, view your test results, renew your prescriptions, schedule appointments, and more. How Do I Sign Up? 1. In your Internet browser, go to [email or web address]  2. Click on the Sign Up Now link in the Sign In box. You will see the New Member Sign Up page. 3. Enter your Hashdoc Access Code exactly as it appears below. You will not need to use this code after youve completed the sign-up process. If you do not sign up before the expiration date, you must request a new code. Hashdoc Access Code: [unfilled]    4. Enter your Social Security Number ([id number]) and Date of Birth (mm/dd/yyyy) as indicated and click Submit. You will be taken to the next sign-up page. 5. Create a Hashdoc ID. This will be your Hashdoc login ID and cannot be changed, so think of one that is secure and easy to remember. 6. Create a Hashdoc password. You can change your password at any time. 7. Enter your Password Reset Question and Answer. This can be used at a later time if you forget your password. 8. Enter your e-mail address. You will receive e-mail notification when new information is available in [address] 9. Click Sign Up. You can now view your medical record. Additional Information  If you have questions, please contact your physician practice where you receive care. Remember, Hashdoc is NOT to be used for urgent needs. For medical emergencies, dial 911.

## 2020-04-03 ENCOUNTER — TELEPHONE (OUTPATIENT)
Dept: FAMILY MEDICINE CLINIC | Age: 45
End: 2020-04-03

## 2020-04-03 NOTE — TELEPHONE ENCOUNTER
Patient was seen at the flu clinic and was given a letter to stay home for 14 days. She said the test came back negative and she wanted to know if she can get a letter going back to work now that she feels better.  Please call patient

## 2020-04-03 NOTE — LETTER
[address] [facility] [address]  Phone: [phone]  Fax: [phone]    Apollo Colin MD      April 6, 2020     Patient: Jose Alejandro Martinez   YOB: 1975   Date of Visit: 4/3/2020       To Whom It May Concern: It is my medical opinion that Jose Alejandro Martinez may return to full participation immediately with no restrictions. If you have any questions or concerns, please don't hesitate to call.     Sincerely,        Apollo Colin MD

## 2020-04-06 NOTE — TELEPHONE ENCOUNTER
Maykel Stratton is ready but pt must  letter at [facility] at [address] [facility] [address], [place], [phone]

## 2020-06-01 ENCOUNTER — VIRTUAL VISIT (OUTPATIENT)
Dept: FAMILY MEDICINE CLINIC | Age: 45
End: 2020-06-01
Payer: COMMERCIAL

## 2020-06-01 PROCEDURE — G8428 CUR MEDS NOT DOCUMENT: HCPCS | Performed by: FAMILY MEDICINE

## 2020-06-01 PROCEDURE — 99213 OFFICE O/P EST LOW 20 MIN: CPT | Performed by: FAMILY MEDICINE

## 2020-06-01 RX ORDER — PANTOPRAZOLE SODIUM 40 MG/1
40 TABLET, DELAYED RELEASE ORAL
Qty: 30 TABLET | Refills: 5 | Status: SHIPPED | OUTPATIENT
Start: 2020-06-01 | End: 2020-11-03 | Stop reason: ALTCHOICE

## 2020-06-01 RX ORDER — SUCRALFATE 1 G/1
1 TABLET ORAL 4 TIMES DAILY
Qty: 120 TABLET | Refills: 3 | Status: SHIPPED | OUTPATIENT
Start: 2020-06-01 | End: 2020-11-03 | Stop reason: ALTCHOICE

## 2020-06-01 NOTE — PROGRESS NOTES
Patient is being seen Virtually using [facility] me. Patient is at home and Dr. Urvashi Montenegro is at the office. The patient has consented to having a virtual visit due to the [place] 19 pandemic. Vitals are patient reported. Chief Complaint   Patient presents with    Emesis     In march got really sick for a week, fever and vomiting. Missed a week of work. Was better for a week but since if eats more than once a day gets sick. First thing she eats during day she is fine no matter what she eats but anything after that she gets vomiting. Doesn't matter what she eats. States will feel hungry but when eats throws up. Doesn't feel has lost a lot of weight. Does not feel sick thru day, only after eats. Bowels are on the loose side, always has BM after eats, states \"extremely loose\" like a milkshake. Occ has BRB with it but has hx of hemorrhoids. Has been eating tums when gets nausea but not helping. Had zofran 8mg but that doesn't help. If overeats the first meal will get heartburn, but o/w no heartburn. No dif swallowing. Has been taking naprosyn BID for past few weeks as back hurts from vomiting, states all around diaphragm to back. Feels like there is a \"pit\" in her epigastric area that food just sits in and that radiates around to back. Patient's medications, allergies, past medical, surgical, social and family histories were reviewed and updated in the EHR as appropriate. There were no vitals filed for this visit. Wt Readings from Last 3 Encounters:   02/12/20 204 lb (92.5 kg)   01/28/20 203 lb (92.1 kg)   11/25/19 206 lb (93.4 kg)     There is no height or weight on file to calculate BMI. PHQ Scores 11/21/2019 3/5/2015   PHQ2 Score 0 0   PHQ9 Score 0 0           GEN: Alert and oriented x 4 NAD, affect appropriate and obese, well developed. ASSESSMENT AND PLAN:       Scotty Marroquin was seen today for emesis.     Diagnoses and all orders for this visit:    Epigastric pain  -     AFL - Lisa Menjivar MD,

## 2020-06-19 ENCOUNTER — HOSPITAL ENCOUNTER (OUTPATIENT)
Age: 45
Discharge: HOME | End: 2020-06-19
Payer: COMMERCIAL

## 2020-06-19 ENCOUNTER — HOSPITAL ENCOUNTER (OUTPATIENT)
Dept: ULTRASOUND IMAGING | Age: 45
Discharge: HOME | End: 2020-06-19
Payer: COMMERCIAL

## 2020-06-19 LAB
A/G RATIO: 1.7 (ref 1.1–2.2)
ALBUMIN SERPL-MCNC: 4.3 G/DL (ref 3.4–5)
ALP BLD-CCNC: 79 U/L (ref 40–129)
ALT SERPL-CCNC: 24 U/L (ref 10–40)
ANION GAP SERPL CALCULATED.3IONS-SCNC: 13 MMOL/L (ref 3–16)
AST SERPL-CCNC: 15 U/L (ref 15–37)
BILIRUB SERPL-MCNC: 0.4 MG/DL (ref 0–1)
BUN BLDV-MCNC: 8 MG/DL (ref 7–20)
CALCIUM SERPL-MCNC: 9.7 MG/DL (ref 8.3–10.6)
CHLORIDE BLD-SCNC: 102 MMOL/L (ref 99–110)
CO2: 28 MMOL/L (ref 21–32)
CREAT SERPL-MCNC: 0.6 MG/DL (ref 0.6–1.1)
GFR AFRICAN AMERICAN: >60
GFR NON-AFRICAN AMERICAN: >60
GLOBULIN: 2.5 G/DL
GLUCOSE BLD-MCNC: 86 MG/DL (ref 70–99)
HCT VFR BLD CALC: 39.6 % (ref 36–48)
HEMOGLOBIN: 13.3 G/DL (ref 12–16)
IGA: 131 MG/DL (ref 70–400)
LIPASE: 28 U/L (ref 13–60)
MCH RBC QN AUTO: 30.8 PG (ref 26–34)
MCHC RBC AUTO-ENTMCNC: 33.7 G/DL (ref 31–36)
MCV RBC AUTO: 91.6 FL (ref 80–100)
PDW BLD-RTO: 13.5 % (ref 12.4–15.4)
PLATELET # BLD: 254 K/UL (ref 135–450)
PMV BLD AUTO: 8.3 FL (ref 5–10.5)
POTASSIUM SERPL-SCNC: 4 MMOL/L (ref 3.5–5.1)
RBC # BLD: 4.33 M/UL (ref 4–5.2)
SODIUM BLD-SCNC: 143 MMOL/L (ref 136–145)
TOTAL PROTEIN: 6.8 G/DL (ref 6.4–8.2)
TSH SERPL DL<=0.05 MIU/L-ACNC: 1.57 UIU/ML (ref 0.27–4.2)
WBC # BLD: 7.5 K/UL (ref 4–11)

## 2020-06-19 PROCEDURE — 76705 ECHO EXAM OF ABDOMEN: CPT

## 2020-06-19 PROCEDURE — 83690 ASSAY OF LIPASE: CPT

## 2020-06-19 PROCEDURE — 83516 IMMUNOASSAY NONANTIBODY: CPT

## 2020-06-19 PROCEDURE — 80053 COMPREHEN METABOLIC PANEL: CPT

## 2020-06-19 PROCEDURE — 84443 ASSAY THYROID STIM HORMONE: CPT

## 2020-06-19 PROCEDURE — 85027 COMPLETE CBC AUTOMATED: CPT

## 2020-06-19 PROCEDURE — 36415 COLL VENOUS BLD VENIPUNCTURE: CPT

## 2020-06-19 PROCEDURE — 82784 ASSAY IGA/IGD/IGG/IGM EACH: CPT

## 2020-06-20 LAB — TISSUE TRANSGLUTAMINASE IGA: <0.5 U/ML (ref 0–14)

## 2020-06-22 ENCOUNTER — TELEPHONE (OUTPATIENT)
Dept: FAMILY MEDICINE CLINIC | Age: 45
End: 2020-06-22

## 2020-06-22 NOTE — RESULT ENCOUNTER NOTE
Patient advised of results. Referrals are in Epic  Patient states she has already seen Dr. Villavicencio Angry and she believes he is going to do her surgery.

## 2020-06-29 NOTE — PROGRESS NOTES
Patient not reached. Preop instructions left on voice mail. [id number]_[phone]________      DATE_7/9/20_______ TIME_0700_______ARRIVAL_0530________      Nothing to eat or drink after midnight the night before,except for what the prep instructions call for. If you do not have the instructions or do not understand them please contact your doctors office. Follow any instructions your doctors office has given you including what medications to take the AM of your procedure and which ones to hold. You may use your inhalers. If you take a long acting insulin the [place] prior please cut the dose in half and take no diabetic medications that AM.Follow specific doctors office instructions regarding blood thinners and if they want you to hold and for how long. If you are on a blood thinner and have no instructions please contact the office and ask. Dress comfortably,bring your insurance card,picture ID,and a complete list of medications, including supplements. You must have a responsible adult to stay with you during the procedure,drive you home and stay with you. [facility] phone number [phone] for any questions. Patient instructed to get their COVID-19 test done as directed by their doctor (5-7 days prior to procedure)  or patient states will get on _7/3/20 @ [facility]_. I The day the COVID test is done is considered day one. Instructed to self quarantine after test until DOS. There is a one visitor policy at [facility] for the pre-op phase only for surgery and endoscopy cases. The visitor is expected to leave the facility after the patient is taken back for the procedure. Pain management is NO VISITOR policyThe patients ride is expected to remain in the car with a cell phone for communication. If the ride is leaving the hospital grounds please make sure they are back in time for pickup. Have the patient inform the staff on arrival what their rides plans are while the patient is in the facility. At the MAIN there is one visitor

## 2020-07-02 ENCOUNTER — OFFICE VISIT (OUTPATIENT)
Dept: PRIMARY CARE CLINIC | Age: 45
End: 2020-07-02
Payer: COMMERCIAL

## 2020-07-02 PROCEDURE — 99211 OFF/OP EST MAY X REQ PHY/QHP: CPT | Performed by: NURSE PRACTITIONER

## 2020-07-05 LAB
SARS-COV-2: NOT DETECTED
SOURCE: NORMAL

## 2020-07-08 ENCOUNTER — ANESTHESIA EVENT (OUTPATIENT)
Dept: ENDOSCOPY | Age: 45
End: 2020-07-08
Payer: COMMERCIAL

## 2020-07-09 ENCOUNTER — HOSPITAL ENCOUNTER (OUTPATIENT)
Age: 45
Setting detail: OUTPATIENT SURGERY
Discharge: HOME | End: 2020-07-09
Attending: INTERNAL MEDICINE | Admitting: INTERNAL MEDICINE
Payer: COMMERCIAL

## 2020-07-09 ENCOUNTER — ANESTHESIA (OUTPATIENT)
Dept: ENDOSCOPY | Age: 45
End: 2020-07-09
Payer: COMMERCIAL

## 2020-07-09 VITALS — SYSTOLIC BLOOD PRESSURE: 192 MMHG | OXYGEN SATURATION: 98 % | DIASTOLIC BLOOD PRESSURE: 146 MMHG

## 2020-07-09 VITALS
WEIGHT: 186 LBS | SYSTOLIC BLOOD PRESSURE: 112 MMHG | HEART RATE: 79 BPM | TEMPERATURE: 97.4 F | BODY MASS INDEX: 37.5 KG/M2 | RESPIRATION RATE: 18 BRPM | DIASTOLIC BLOOD PRESSURE: 94 MMHG | OXYGEN SATURATION: 100 % | HEIGHT: 59 IN

## 2020-07-09 PROCEDURE — 2580000003 HC RX 258: Performed by: ANESTHESIOLOGY

## 2020-07-09 PROCEDURE — 3609012400 HC EGD TRANSORAL BIOPSY SINGLE/MULTIPLE: Performed by: INTERNAL MEDICINE

## 2020-07-09 PROCEDURE — 6360000002 HC RX W HCPCS: Performed by: NURSE ANESTHETIST, CERTIFIED REGISTERED

## 2020-07-09 PROCEDURE — 3700000000 HC ANESTHESIA ATTENDED CARE: Performed by: INTERNAL MEDICINE

## 2020-07-09 PROCEDURE — 2500000003 HC RX 250 WO HCPCS: Performed by: NURSE ANESTHETIST, CERTIFIED REGISTERED

## 2020-07-09 PROCEDURE — 7100000011 HC PHASE II RECOVERY - ADDTL 15 MIN: Performed by: INTERNAL MEDICINE

## 2020-07-09 PROCEDURE — 2709999900 HC NON-CHARGEABLE SUPPLY: Performed by: INTERNAL MEDICINE

## 2020-07-09 PROCEDURE — 7100000010 HC PHASE II RECOVERY - FIRST 15 MIN: Performed by: INTERNAL MEDICINE

## 2020-07-09 PROCEDURE — 3609010300 HC COLONOSCOPY W/BIOPSY SINGLE/MULTIPLE: Performed by: INTERNAL MEDICINE

## 2020-07-09 PROCEDURE — 3609010600 HC COLONOSCOPY POLYPECTOMY SNARE/COLD BIOPSY: Performed by: INTERNAL MEDICINE

## 2020-07-09 PROCEDURE — 3700000001 HC ADD 15 MINUTES (ANESTHESIA): Performed by: INTERNAL MEDICINE

## 2020-07-09 RX ORDER — LIDOCAINE HYDROCHLORIDE 20 MG/ML
INJECTION, SOLUTION EPIDURAL; INFILTRATION; INTRACAUDAL; PERINEURAL PRN
Status: DISCONTINUED | OUTPATIENT
Start: 2020-07-09 | End: 2020-07-09 | Stop reason: SDUPTHER

## 2020-07-09 RX ORDER — PROMETHAZINE HYDROCHLORIDE 25 MG/1
25 TABLET ORAL 4 TIMES DAILY PRN
COMMUNITY

## 2020-07-09 RX ORDER — SODIUM CHLORIDE 9 MG/ML
INJECTION, SOLUTION INTRAVENOUS CONTINUOUS
Status: DISCONTINUED | OUTPATIENT
Start: 2020-07-09 | End: 2020-07-09 | Stop reason: HOSPADM

## 2020-07-09 RX ORDER — PROPOFOL 10 MG/ML
INJECTION, EMULSION INTRAVENOUS PRN
Status: DISCONTINUED | OUTPATIENT
Start: 2020-07-09 | End: 2020-07-09 | Stop reason: SDUPTHER

## 2020-07-09 RX ADMIN — SODIUM CHLORIDE: 9 INJECTION, SOLUTION INTRAVENOUS at 07:03

## 2020-07-09 RX ADMIN — PROPOFOL 50 MG: 10 INJECTION, EMULSION INTRAVENOUS at 07:13

## 2020-07-09 RX ADMIN — PROPOFOL 50 MG: 10 INJECTION, EMULSION INTRAVENOUS at 07:19

## 2020-07-09 RX ADMIN — PROPOFOL 50 MG: 10 INJECTION, EMULSION INTRAVENOUS at 07:08

## 2020-07-09 RX ADMIN — SODIUM CHLORIDE: 9 INJECTION, SOLUTION INTRAVENOUS at 06:10

## 2020-07-09 RX ADMIN — LIDOCAINE HYDROCHLORIDE 100 MG: 20 INJECTION, SOLUTION EPIDURAL; INFILTRATION; INTRACAUDAL; PERINEURAL at 07:04

## 2020-07-09 RX ADMIN — PROPOFOL 100 MG: 10 INJECTION, EMULSION INTRAVENOUS at 07:04

## 2020-07-09 ASSESSMENT — PAIN SCALES - GENERAL
PAINLEVEL_OUTOF10: 0

## 2020-07-09 ASSESSMENT — PAIN - FUNCTIONAL ASSESSMENT: PAIN_FUNCTIONAL_ASSESSMENT: 0-10

## 2020-07-09 NOTE — H&P
[facility] GI and [facility] [place]    Pre-operative History and Physical    Patient: Juan Fisher  : 1975  CSN:     History Obtained From:   Patient or guardian. HISTORY OF PRESENT ILLNESS:    The patient is a 45 y.o. female here for Endoscopy. Past Medical History:    Past Medical History:   Diagnosis Date    GERD (gastroesophageal reflux disease)     H/O myringotomy     w/tube placement     Sleep apnea syndrome     no c-pap    Tobacco use disorder      Past Surgical History:    Past Surgical History:   Procedure Laterality Date    ADENOIDECTOMY      DILATION AND CURETTAGE OF UTERUS  11    DILATION AND CURETTAGE OF UTERUS  12    HYSTERECTOMY, TOTAL ABDOMINAL  2018    total hysterectomy, BSO    TYMPANOSTOMY TUBE PLACEMENT       Medications Prior to Admission:   No current facility-administered medications on file prior to encounter. Current Outpatient Medications on File Prior to Encounter   Medication Sig Dispense Refill    Acetaminophen 500 MG CAPS Take 1,000 mg by mouth 4 times daily as needed for Pain      pantoprazole (PROTONIX) 40 MG tablet Take 1 tablet by mouth every morning (before breakfast) 30 tablet 5    sucralfate (CARAFATE) 1 GM tablet Take 1 tablet by mouth 4 times daily 120 tablet 3    promethazine (PHENERGAN) 25 MG tablet Take 25 mg by mouth 4 times daily as needed for Nausea      atorvastatin (LIPITOR) 40 MG tablet Take 0.5 tablets by mouth daily 30 tablet 3    meclizine (ANTIVERT) 25 MG tablet Take 1 tablet by mouth 3 times daily as needed for Dizziness 60 tablet 3    phenylephrine-APAP-guaiFENesin (MUCINEX SINUS-MAX CONGESTION) -400 MG/20ML LIQD Take 20 mLs by mouth every 4 hours      Naproxen Sodium (ALEVE PO) Take by mouth          Allergies:  Patient has no known allergies.       Social History:   Social History     Tobacco Use    Smoking status: Current Every Day Smoker     Packs/day: 0.50     Years: 14.00     Pack years: 7.00 Types: Cigarettes    Smokeless tobacco: Never Used   Substance Use Topics    Alcohol use: No     Comment: rare     Family History:   Family History   Problem Relation Age of Onset    Heart Disease Father 39    High Blood Pressure Mother     High Cholesterol Mother     Cancer Other     Alcohol Abuse Other     Stroke Other     High Blood Pressure Other     Lung Cancer Maternal Grandmother 60        +smoker    Colon Cancer Maternal Grandmother     No Known Problems Brother     No Known Problems Brother     No Known Problems Brother        PHYSICAL EXAM:      /87   Pulse 89   Temp 97.4 °F (36.3 °C) (Temporal)   Resp 16   Ht 4' 11\" (1.499 m)   Wt 186 lb (84.4 kg)   LMP 03/29/2018   SpO2 99%   BMI 37.57 kg/m²  I        Heart:  RRR, normal s1s2    Lungs:  CTA and normal effort    Abdomen:   Soft, nt nd. ASSESSMENT AND PLAN:    1. Patient is a 45 y.o. female here for endoscopy with MAC sedation. 2.  Procedure options, risks and benefits reviewed with patient and/or guardian. They express understanding.

## 2020-07-09 NOTE — ANESTHESIA PRE PROCEDURE
Department of Anesthesiology  Preprocedure Note       Name:  Meghan Brewer   Age:  45 y.o.  :  1975                                          MRN:  [id number]         Date:  2020      Surgeon: Carolyn Montero):  Marjorie Yuan MD    Procedure: Procedure(s):  COLONOSCOPY DIAGNOSTIC  EGD DIAGNOSTIC ONLY    Medications prior to admission:   Prior to Admission medications    Medication Sig Start Date End Date Taking?  Authorizing Provider   Acetaminophen 500 MG CAPS Take 1,000 mg by mouth 4 times daily as needed for Pain   Yes Historical Provider, MD   pantoprazole (PROTONIX) 40 MG tablet Take 1 tablet by mouth every morning (before breakfast) 20  Yes Atiya Fiore MD   sucralfate (CARAFATE) 1 GM tablet Take 1 tablet by mouth 4 times daily 20  Yes Atiya Fiore MD   promethazine (PHENERGAN) 25 MG tablet Take 25 mg by mouth 4 times daily as needed for Nausea    Historical Provider, MD   atorvastatin (LIPITOR) 40 MG tablet Take 0.5 tablets by mouth daily 20   Atiya Fiore MD   meclizine (ANTIVERT) 25 MG tablet Take 1 tablet by mouth 3 times daily as needed for Dizziness 19   Atiya Fiore MD   phenylephrine-APAP-guaiFENesin (MUCINEX SINUS-MAX CONGESTION) -893 MG/20ML LIQD Take 20 mLs by mouth every 4 hours    Historical Provider, MD   Naproxen Sodium (ALEVE PO) Take by mouth    Historical Provider, MD       Current medications:    Current Facility-Administered Medications   Medication Dose Route Frequency Provider Last Rate Last Dose    0.9 % sodium chloride infusion   Intravenous Continuous Kim Koo  mL/hr at 20 0610         Allergies:  No Known Allergies    Problem List:    Patient Active Problem List   Diagnosis Code    H/O myringotomy Z98.890    Tobacco use disorder F17.200    Sleep apnea syndrome G47.30    Hemorrhoids K64.9    Family history of premature CAD Z82.49    Adnexal mass N94.89    Intractable migraine without aura and with status migrainosus G43.011    Pure hypercholesterolemia E78.00       Past Medical History:        Diagnosis Date    GERD (gastroesophageal reflux disease)     H/O myringotomy     w/tube placement     Sleep apnea syndrome     no c-pap    Tobacco use disorder        Past Surgical History:        Procedure Laterality Date    ADENOIDECTOMY      DILATION AND CURETTAGE OF UTERUS  11/30/11    DILATION AND CURETTAGE OF UTERUS  5/24/12    HYSTERECTOMY, TOTAL ABDOMINAL  04/05/2018    total hysterectomy, BSO    TYMPANOSTOMY TUBE PLACEMENT         Social History:    Social History     Tobacco Use    Smoking status: Current Every Day Smoker     Packs/day: 0.50     Years: 14.00     Pack years: 7.00     Types: Cigarettes    Smokeless tobacco: Never Used   Substance Use Topics    Alcohol use: No     Comment: rare                                Ready to quit: Not Answered  Counseling given: Not Answered      Vital Signs (Current):   Vitals:    07/09/20 0545   BP: 127/87   Pulse: 89   Resp: 16   Temp: 97.4 °F (36.3 °C)   TempSrc: Temporal   SpO2: 99%   Weight: 186 lb (84.4 kg)   Height: 4' 11\" (1.499 m)                                              BP Readings from Last 3 Encounters:   07/09/20 127/87   02/12/20 110/78   01/28/20 112/72       NPO Status: Time of last liquid consumption: 0200                        Time of last solid consumption: 2345                        Date of last liquid consumption: 07/09/20(prep)                        Date of last solid food consumption: 07/07/20    BMI:   Wt Readings from Last 3 Encounters:   07/09/20 186 lb (84.4 kg)   02/12/20 204 lb (92.5 kg)   01/28/20 203 lb (92.1 kg)     Body mass index is 37.57 kg/m².     CBC:   Lab Results   Component Value Date    WBC 7.5 06/19/2020    RBC 4.33 06/19/2020    HGB 13.3 06/19/2020    HCT 39.6 06/19/2020    MCV 91.6 06/19/2020    RDW 13.5 06/19/2020     06/19/2020       CMP:   Lab Results   Component Value Date     06/19/2020    K 4.0 06/19/2020     06/19/2020    CO2 28 06/19/2020    BUN 8 06/19/2020    CREATININE 0.6 06/19/2020    GFRAA >60 06/19/2020    GFRAA >60 11/30/2011    AGRATIO 1.7 06/19/2020    LABGLOM >60 06/19/2020    GLUCOSE 86 06/19/2020    PROT 6.8 06/19/2020    CALCIUM 9.7 06/19/2020    BILITOT 0.4 06/19/2020    ALKPHOS 79 06/19/2020    AST 15 06/19/2020    ALT 24 06/19/2020       POC Tests: No results for input(s): POCGLU, POCNA, POCK, POCCL, POCBUN, POCHEMO, POCHCT in the last 72 hours. Coags:   Lab Results   Component Value Date    PROTIME 11.4 04/11/2018    INR 1.01 04/11/2018       HCG (If Applicable):   Lab Results   Component Value Date    PREGTESTUR Negative 04/05/2018        ABGs: No results found for: PHART, PO2ART, RLV8QAX, DOM2GHN, BEART, Z4NQNRRI     Type & Screen (If Applicable):  Lab Results   Component Value Date    LABABO A 04/27/2012    [address] Positive 04/27/2012       Drug/Infectious Status (If Applicable):  No results found for: HIV, HEPCAB    COVID-19 Screening (If Applicable):   Lab Results   Component Value Date    COVID19 Not Detected 07/02/2020         Anesthesia Evaluation  Patient summary reviewed and Nursing notes reviewed  Airway: Mallampati: III        Dental:          Pulmonary:   (+) sleep apnea:                             Cardiovascular:                      Neuro/Psych:   (+) headaches:,             GI/Hepatic/Renal:   (+) GERD:,           Endo/Other:                     Abdominal:           Vascular:                                        Anesthesia Plan      MAC     ASA 3       Induction: intravenous. Plan discussed with CRNA.                   Wilfrid Howard MD   7/9/2020

## 2020-07-09 NOTE — ANESTHESIA POSTPROCEDURE EVALUATION
Department of Anesthesiology  Postprocedure Note    Patient: Skyler Heller  MRN: [id number]  YOB: 1975  Date of evaluation: 7/9/2020  Time:  7:42 AM     Procedure Summary     Date:  07/09/20 Room / Location:  [address] [place] [address]    Anesthesia Start:  0703 Anesthesia Stop:  9249    Procedures:       COLONOSCOPY POLYPECTOMY SNARE/COLD BIOPSY (N/A )      EGD BIOPSY (N/A Abdomen)      COLONOSCOPY WITH BIOPSY Diagnosis:       (HEMATOCHEZIA K92.1)      (N&V (NAUSEA AND VOMITING) R11.2)    Surgeon:  Jose A Roth MD Responsible Provider:  Romeo Bautista MD    Anesthesia Type:  MAC ASA Status:  3          Anesthesia Type: MAC    Ksenia Phase I: Ksenia Score: 10    Ksenia Phase II: Ksenia Score: 10    Last vitals: Reviewed and per EMR flowsheets.        Anesthesia Post Evaluation    Patient location during evaluation: PACU  Patient participation: complete - patient participated  Level of consciousness: awake  Airway patency: patent  Nausea & Vomiting: no vomiting and no nausea  Complications: no  Cardiovascular status: hemodynamically stable  Respiratory status: acceptable  Hydration status: stable

## 2020-07-14 ENCOUNTER — OFFICE VISIT (OUTPATIENT)
Dept: SURGERY | Age: 45
End: 2020-07-14
Payer: COMMERCIAL

## 2020-07-14 VITALS
BODY MASS INDEX: 37.77 KG/M2 | DIASTOLIC BLOOD PRESSURE: 70 MMHG | TEMPERATURE: 98 F | SYSTOLIC BLOOD PRESSURE: 110 MMHG | WEIGHT: 187 LBS

## 2020-07-14 PROCEDURE — 4004F PT TOBACCO SCREEN RCVD TLK: CPT | Performed by: SURGERY

## 2020-07-14 PROCEDURE — G8427 DOCREV CUR MEDS BY ELIG CLIN: HCPCS | Performed by: SURGERY

## 2020-07-14 PROCEDURE — 99203 OFFICE O/P NEW LOW 30 MIN: CPT | Performed by: SURGERY

## 2020-07-14 PROCEDURE — G8417 CALC BMI ABV UP PARAM F/U: HCPCS | Performed by: SURGERY

## 2020-07-14 ASSESSMENT — ENCOUNTER SYMPTOMS
CHEST TIGHTNESS: 0
ABDOMINAL PAIN: 1
NAUSEA: 1
BACK PAIN: 0
EYE DISCHARGE: 0
COLOR CHANGE: 0
DIARRHEA: 1
APNEA: 1
EYE ITCHING: 0
VOMITING: 1

## 2020-07-14 NOTE — PROGRESS NOTES
food allergies. Neurological: Negative for dizziness and facial asymmetry. Hematological: Negative for adenopathy. Does not bruise/bleed easily. Psychiatric/Behavioral: Negative for agitation and behavioral problems.        Past Medical History:   Diagnosis Date    GERD (gastroesophageal reflux disease)     H/O myringotomy     w/tube placement     Sleep apnea syndrome     no c-pap    Tobacco use disorder      Past Surgical History:   Procedure Laterality Date    ADENOIDECTOMY      COLONOSCOPY N/A 7/9/2020    COLONOSCOPY POLYPECTOMY SNARE/COLD BIOPSY performed by Amira Almonte MD at [address] [facility]  7/9/2020    COLONOSCOPY WITH BIOPSY performed by [facility]te [facility] at [facility]  11/30/11    DILATION AND CURETTAGE OF UTERUS  5/24/12    HYSTERECTOMY, TOTAL ABDOMINAL  04/05/2018    total hysterectomy, BSO    TYMPANOSTOMY TUBE PLACEMENT      UPPER GASTROINTESTINAL ENDOSCOPY N/A 7/9/2020    EGD BIOPSY performed by Amira Almonte MD at [address] History     Socioeconomic History    Marital status:      Spouse name: Not on file    Number of children: 2    Years of education: Not on file    Highest education level: Not on file   Occupational History    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     Social Needs    Financial resource strain: Not on file    Food insecurity     Worry: Not on file     Inability: Not on file   [employer] needs     Medical: Not on file     Non-medical: Not on file   Tobacco Use    Smoking status: Current Every Day Smoker     Packs/day: 0.50     Years: 14.00     Pack years: 7.00     Types: Cigarettes    Smokeless tobacco: Never Used   Substance and Sexual Activity    Alcohol use: No     Comment: rare    Drug use: No    Sexual activity: Yes     Partners: Male   Lifestyle    Physical activity     Days per week: Not on file     Minutes per session: Not on file    Stress: Not on file Relationships    Social connections     Talks on phone: Not on file     Gets together: Not on file     Attends Christianity service: Not on file     Active member of club or organization: Not on file     Attends meetings of clubs or organizations: Not on file     Relationship status: Not on file    Intimate partner violence     Fear of current or ex partner: Not on file     Emotionally abused: Not on file     Physically abused: Not on file     Forced sexual activity: Not on file   Other Topics Concern    Not on file   Social History Narrative    Not on file      Family History   Problem Relation Age of Onset    Heart Disease Father 39    High Blood Pressure Mother     High Cholesterol Mother     Cancer Other     Alcohol Abuse Other     Stroke Other     High Blood Pressure Other     Lung Cancer Maternal Grandmother 60        +smoker    Colon Cancer Maternal Grandmother     No Known Problems Brother     No Known Problems Brother     No Known Problems Brother      Current Outpatient Medications   Medication Sig Dispense Refill    promethazine (PHENERGAN) 25 MG tablet Take 25 mg by mouth 4 times daily as needed for Nausea      Acetaminophen 500 MG CAPS Take 1,000 mg by mouth 4 times daily as needed for Pain      pantoprazole (PROTONIX) 40 MG tablet Take 1 tablet by mouth every morning (before breakfast) 30 tablet 5    sucralfate (CARAFATE) 1 GM tablet Take 1 tablet by mouth 4 times daily 120 tablet 3    atorvastatin (LIPITOR) 40 MG tablet Take 0.5 tablets by mouth daily 30 tablet 3    meclizine (ANTIVERT) 25 MG tablet Take 1 tablet by mouth 3 times daily as needed for Dizziness 60 tablet 3    phenylephrine-APAP-guaiFENesin (MUCINEX SINUS-MAX CONGESTION) -400 MG/20ML LIQD Take 20 mLs by mouth every 4 hours      Naproxen Sodium (ALEVE PO) Take by mouth       No current facility-administered medications for this visit.        No Known Allergies     OBJECTIVE:  /70   Temp 98 °F (36.7 °C) (Skin)   Wt 187 lb (84.8 kg)   LMP 03/29/2018   BMI 37.77 kg/m²    Physical Exam  Constitutional:       General: She is not in acute distress. Appearance: She is well-developed. She is not diaphoretic. HENT:      Head: Normocephalic and atraumatic. Eyes:      Conjunctiva/sclera: Conjunctivae normal.      Pupils: Pupils are equal, round, and reactive to light. Neck:      Musculoskeletal: Normal range of motion and neck supple. Thyroid: No thyromegaly. Trachea: No tracheal deviation. Cardiovascular:      Rate and Rhythm: Normal rate and regular rhythm. Heart sounds: Normal heart sounds. No murmur. No friction rub. No gallop. Pulmonary:      Effort: Pulmonary effort is normal. No respiratory distress. Breath sounds: Normal breath sounds. No wheezing. Abdominal:      General: There is no distension. Palpations: Abdomen is soft. There is no mass. Tenderness: There is abdominal tenderness. There is no guarding or rebound. Lymphadenopathy:      Cervical: No cervical adenopathy. Skin:     General: Skin is warm. Findings: No erythema or rash. Neurological:      Mental Status: She is alert and oriented to person, place, and time. Psychiatric:         Behavior: Behavior normal.         Thought Content: Thought content normal.         Judgment: Judgment normal.          Labs:  Office Visit on 07/02/2020   Component Date Value Ref Range Status    SARS-CoV-2 07/02/2020 Not Detected  Not Detected Final    Comment: This test was developed and its performance  characteristics determined by [employer]. This test has not been FDA  cleared or approved. This test has been  authorized by FDA under an Emergency Use  Authorization (EUA).  This test is only  authorized for the duration of time the  declaration that circumstances exist  justifying the authorization of the  emergency use of in vitro diagnostic tests  for detection of SARS-CoV-2 virus and/or  diagnosis of COVID-19 infection under  section 564(b)(1) of the Act, 21 U. S.C.  743EJQ-8(K)(3), unless the authorization is  terminated or revoked sooner. When diagnostic testing is negative, the  possibility of a false negative result  should be considered in the context of a  patient's recent exposures and the presence  of clinical signs and symptoms consistent  with COVID-19. An individual without  symptoms of COVID-19 and who is not shedding  SARS-CoV-2 virus would expect to have a  negative (not detected) result in this  assay.   Performe                           d at:  [place] [id number], [facility], [address]  : Cyril Marmolejo MD, Phone:  [phone]      Source 07/02/2020 NP swab   Final   Hospital Outpatient Visit on 06/19/2020   Component Date Value Ref Range Status    TSH 06/19/2020 1.57  0.27 - 4.20 uIU/mL Final    TISSUE TRANSGLUTAMINASE IGA 06/19/2020 <0.5  0.0 - 14.0 U/mL Final    IgA 06/19/2020 131.0  70.0 - 400.0 mg/dL Final    WBC 06/19/2020 7.5  4.0 - 11.0 K/uL Final    RBC 06/19/2020 4.33  4.00 - 5.20 M/uL Final    Hemoglobin 06/19/2020 13.3  12.0 - 16.0 g/dL Final    Hematocrit 06/19/2020 39.6  36.0 - 48.0 % Final    MCV 06/19/2020 91.6  80.0 - 100.0 fL Final    MCH 06/19/2020 30.8  26.0 - 34.0 pg Final    MCHC 06/19/2020 33.7  31.0 - 36.0 g/dL Final    RDW 06/19/2020 13.5  12.4 - 15.4 % Final    Platelets 41/73/5545 254  135 - 450 K/uL Final    MPV 06/19/2020 8.3  5.0 - 10.5 fL Final    Lipase 06/19/2020 28.0  13.0 - 60.0 U/L Final    Sodium 06/19/2020 143  136 - 145 mmol/L Final    Potassium 06/19/2020 4.0  3.5 - 5.1 mmol/L Final    Chloride 06/19/2020 102  99 - 110 mmol/L Final    CO2 06/19/2020 28  21 - 32 mmol/L Final    Anion Gap 06/19/2020 13  3 - 16 Final    Glucose 06/19/2020 86  70 - 99 mg/dL Final    BUN 06/19/2020 8  7 - 20 mg/dL Final    CREATININE 06/19/2020 0.6  0.6 - 1.1 mg/dL Final    GFR Non- 06/19/2020 >60  >60 Final    Comment: >60 mL/min/1.73m2 EGFR, calc. for ages 25 and older using the  MDRD formula (not corrected for weight), is valid for stable  renal function.  GFR  06/19/2020 >60  >60 Final    Comment: Chronic Kidney Disease: less than 60 ml/min/1.73 sq.m. Kidney Failure: less than 15 ml/min/1.73 sq.m. Results valid for patients 18 years and older.  Calcium 06/19/2020 9.7  8.3 - 10.6 mg/dL Final    Total Protein 06/19/2020 6.8  6.4 - 8.2 g/dL Final    Alb 06/19/2020 4.3  3.4 - 5.0 g/dL Final    Albumin/Globulin Ratio 06/19/2020 1.7  1.1 - 2.2 Final    Total Bilirubin 06/19/2020 0.4  0.0 - 1.0 mg/dL Final    Alkaline Phosphatase 06/19/2020 79  40 - 129 U/L Final    ALT 06/19/2020 24  10 - 40 U/L Final    AST 06/19/2020 15  15 - 37 U/L Final    Globulin 06/19/2020 2.5  g/dL Final       Imaging:  Us Gallbladder Ruq    Result Date: 6/19/2020  EXAMINATION: RIGHT UPPER QUADRANT ULTRASOUND 6/19/2020 11:29 am COMPARISON: None. HISTORY: ORDERING SYSTEM PROVIDED HISTORY: Epigastric pain FINDINGS: LIVER:  The liver demonstrates normal echogenicity without evidence of intrahepatic biliary ductal dilatation. BILIARY SYSTEM:  Large amount of sludge is well as stones within the gallbladder. No pericholecystic fluid. Gallbladder wall appears normal in thickness. Common bile duct is within normal limits measuring beaters. RIGHT KIDNEY: The right kidney is grossly unremarkable without evidence of hydronephrosis. PANCREAS:  Visualized portions of the pancreas are unremarkable. OTHER: No evidence of right upper quadrant ascites. Cholelithiasis. No evidence of acute cholecystitis. ASSESSMENT:  Epigastric pain  Possible delayed gastric emptying  Cholelithiasis    PLAN:  1. Although the patient has cholelithiasis, her symptoms were not classic for biliary colic. Differential diagnosis also includes delayed gastric emptying.   Discussed options including proceeding with cholecystectomy versus delayed gastric emptying study and she wishes for continued work-up and observation  2. In the interim, the patient is to avoid high fat foods, which are known triggers for biliary colic as well as liquids when able as may be better tolerated with delayed gastric emptying  3. Return to general surgery office in 2 to 3 weeks after the above work-up to discuss results and further options    Ronnie Orta MD, FACS  7/14/2020  1:57 PM

## 2020-07-23 ENCOUNTER — HOSPITAL ENCOUNTER (OUTPATIENT)
Dept: NUCLEAR MEDICINE | Age: 45
Discharge: HOME | End: 2020-07-23
Payer: COMMERCIAL

## 2020-07-23 PROCEDURE — 78264 GASTRIC EMPTYING IMG STUDY: CPT

## 2020-07-23 PROCEDURE — A9541 TC99M SULFUR COLLOID: HCPCS | Performed by: INTERNAL MEDICINE

## 2020-07-23 PROCEDURE — 3430000000 HC RX DIAGNOSTIC RADIOPHARMACEUTICAL: Performed by: INTERNAL MEDICINE

## 2020-07-23 RX ADMIN — Medication 0.8 MILLICURIE: at 08:42

## 2020-11-03 ENCOUNTER — ANESTHESIA (OUTPATIENT)
Dept: OPERATING ROOM | Age: 45
DRG: 418 | End: 2020-11-03
Payer: COMMERCIAL

## 2020-11-03 ENCOUNTER — ANESTHESIA EVENT (OUTPATIENT)
Dept: OPERATING ROOM | Age: 45
DRG: 418 | End: 2020-11-03
Payer: COMMERCIAL

## 2020-11-03 ENCOUNTER — HOSPITAL ENCOUNTER (INPATIENT)
Age: 45
LOS: 1 days | Discharge: HOME | DRG: 418 | End: 2020-11-04
Attending: EMERGENCY MEDICINE | Admitting: SURGERY
Payer: COMMERCIAL

## 2020-11-03 ENCOUNTER — APPOINTMENT (OUTPATIENT)
Dept: GENERAL RADIOLOGY | Age: 45
DRG: 418 | End: 2020-11-03
Payer: COMMERCIAL

## 2020-11-03 ENCOUNTER — APPOINTMENT (OUTPATIENT)
Dept: ULTRASOUND IMAGING | Age: 45
DRG: 418 | End: 2020-11-03
Payer: COMMERCIAL

## 2020-11-03 ENCOUNTER — APPOINTMENT (OUTPATIENT)
Dept: CT IMAGING | Age: 45
DRG: 418 | End: 2020-11-03
Payer: COMMERCIAL

## 2020-11-03 VITALS
DIASTOLIC BLOOD PRESSURE: 92 MMHG | OXYGEN SATURATION: 96 % | RESPIRATION RATE: 20 BRPM | SYSTOLIC BLOOD PRESSURE: 137 MMHG | TEMPERATURE: 97.3 F

## 2020-11-03 PROBLEM — K80.00 ACUTE CALCULOUS CHOLECYSTITIS: Status: ACTIVE | Noted: 2020-11-03

## 2020-11-03 LAB
A/G RATIO: 1.7 (ref 1.1–2.2)
ALBUMIN SERPL-MCNC: 5.1 G/DL (ref 3.4–5)
ALP BLD-CCNC: 105 U/L (ref 40–129)
ALT SERPL-CCNC: 28 U/L (ref 10–40)
ANION GAP SERPL CALCULATED.3IONS-SCNC: 14 MMOL/L (ref 3–16)
AST SERPL-CCNC: 17 U/L (ref 15–37)
BASOPHILS ABSOLUTE: 0.1 K/UL (ref 0–0.2)
BASOPHILS RELATIVE PERCENT: 0.5 %
BILIRUB SERPL-MCNC: <0.2 MG/DL (ref 0–1)
BILIRUBIN URINE: NEGATIVE
BLOOD, URINE: NEGATIVE
BUN BLDV-MCNC: 11 MG/DL (ref 7–20)
CALCIUM SERPL-MCNC: 10.4 MG/DL (ref 8.3–10.6)
CHLORIDE BLD-SCNC: 102 MMOL/L (ref 99–110)
CLARITY: CLEAR
CO2: 27 MMOL/L (ref 21–32)
COLOR: YELLOW
CREAT SERPL-MCNC: <0.5 MG/DL (ref 0.6–1.1)
EOSINOPHILS ABSOLUTE: 0 K/UL (ref 0–0.6)
EOSINOPHILS RELATIVE PERCENT: 0.3 %
EPITHELIAL CELLS, UA: 1 /HPF (ref 0–5)
GFR AFRICAN AMERICAN: >60
GFR NON-AFRICAN AMERICAN: >60
GLOBULIN: 3 G/DL
GLUCOSE BLD-MCNC: 104 MG/DL (ref 70–99)
GLUCOSE URINE: NEGATIVE MG/DL
HCG QUALITATIVE: NEGATIVE
HCT VFR BLD CALC: 41.5 % (ref 36–48)
HEMOGLOBIN: 14.2 G/DL (ref 12–16)
HYALINE CASTS: 5 /LPF (ref 0–8)
KETONES, URINE: 15 MG/DL
LEUKOCYTE ESTERASE, URINE: NEGATIVE
LIPASE: 28 U/L (ref 13–60)
LYMPHOCYTES ABSOLUTE: 2.2 K/UL (ref 1–5.1)
LYMPHOCYTES RELATIVE PERCENT: 19 %
MCH RBC QN AUTO: 30.5 PG (ref 26–34)
MCHC RBC AUTO-ENTMCNC: 34.3 G/DL (ref 31–36)
MCV RBC AUTO: 89 FL (ref 80–100)
MICROSCOPIC EXAMINATION: YES
MONOCYTES ABSOLUTE: 0.8 K/UL (ref 0–1.3)
MONOCYTES RELATIVE PERCENT: 6.4 %
NEUTROPHILS ABSOLUTE: 8.6 K/UL (ref 1.7–7.7)
NEUTROPHILS RELATIVE PERCENT: 73.8 %
NITRITE, URINE: NEGATIVE
PDW BLD-RTO: 13.8 % (ref 12.4–15.4)
PH UA: 7.5 (ref 5–8)
PLATELET # BLD: 312 K/UL (ref 135–450)
PMV BLD AUTO: 7.7 FL (ref 5–10.5)
POTASSIUM SERPL-SCNC: 3.5 MMOL/L (ref 3.5–5.1)
PROTEIN UA: ABNORMAL MG/DL
RBC # BLD: 4.67 M/UL (ref 4–5.2)
RBC UA: NORMAL /HPF (ref 0–4)
SODIUM BLD-SCNC: 143 MMOL/L (ref 136–145)
SPECIFIC GRAVITY UA: >1.03 (ref 1–1.03)
TOTAL PROTEIN: 8.1 G/DL (ref 6.4–8.2)
URINE REFLEX TO CULTURE: ABNORMAL
URINE TYPE: ABNORMAL
UROBILINOGEN, URINE: 0.2 E.U./DL
WBC # BLD: 11.7 K/UL (ref 4–11)
WBC UA: 1 /HPF (ref 0–5)

## 2020-11-03 PROCEDURE — 94760 N-INVAS EAR/PLS OXIMETRY 1: CPT

## 2020-11-03 PROCEDURE — 87075 CULTR BACTERIA EXCEPT BLOOD: CPT

## 2020-11-03 PROCEDURE — 96365 THER/PROPH/DIAG IV INF INIT: CPT

## 2020-11-03 PROCEDURE — APPNB30 APP NON BILLABLE TIME 0-30 MINS: Performed by: NURSE PRACTITIONER

## 2020-11-03 PROCEDURE — 83690 ASSAY OF LIPASE: CPT

## 2020-11-03 PROCEDURE — 2580000003 HC RX 258: Performed by: NURSE ANESTHETIST, CERTIFIED REGISTERED

## 2020-11-03 PROCEDURE — 6360000002 HC RX W HCPCS: Performed by: FAMILY MEDICINE

## 2020-11-03 PROCEDURE — 2700000000 HC OXYGEN THERAPY PER DAY

## 2020-11-03 PROCEDURE — 2580000003 HC RX 258: Performed by: PHYSICIAN ASSISTANT

## 2020-11-03 PROCEDURE — 6360000002 HC RX W HCPCS: Performed by: PHYSICIAN ASSISTANT

## 2020-11-03 PROCEDURE — 6360000002 HC RX W HCPCS: Performed by: NURSE ANESTHETIST, CERTIFIED REGISTERED

## 2020-11-03 PROCEDURE — 6370000000 HC RX 637 (ALT 250 FOR IP): Performed by: FAMILY MEDICINE

## 2020-11-03 PROCEDURE — 6360000002 HC RX W HCPCS: Performed by: SURGERY

## 2020-11-03 PROCEDURE — APPSS60 APP SPLIT SHARED TIME 46-60 MINUTES: Performed by: NURSE PRACTITIONER

## 2020-11-03 PROCEDURE — 80053 COMPREHEN METABOLIC PANEL: CPT

## 2020-11-03 PROCEDURE — 2580000003 HC RX 258: Performed by: SURGERY

## 2020-11-03 PROCEDURE — 7100000000 HC PACU RECOVERY - FIRST 15 MIN: Performed by: SURGERY

## 2020-11-03 PROCEDURE — 99222 1ST HOSP IP/OBS MODERATE 55: CPT | Performed by: SURGERY

## 2020-11-03 PROCEDURE — 6360000004 HC RX CONTRAST MEDICATION: Performed by: EMERGENCY MEDICINE

## 2020-11-03 PROCEDURE — 3600000004 HC SURGERY LEVEL 4 BASE: Performed by: SURGERY

## 2020-11-03 PROCEDURE — 6370000000 HC RX 637 (ALT 250 FOR IP): Performed by: SURGERY

## 2020-11-03 PROCEDURE — 88304 TISSUE EXAM BY PATHOLOGIST: CPT

## 2020-11-03 PROCEDURE — 47562 LAPAROSCOPIC CHOLECYSTECTOMY: CPT | Performed by: SURGERY

## 2020-11-03 PROCEDURE — 96372 THER/PROPH/DIAG INJ SC/IM: CPT

## 2020-11-03 PROCEDURE — 3600000014 HC SURGERY LEVEL 4 ADDTL 15MIN: Performed by: SURGERY

## 2020-11-03 PROCEDURE — 2500000003 HC RX 250 WO HCPCS

## 2020-11-03 PROCEDURE — C9113 INJ PANTOPRAZOLE SODIUM, VIA: HCPCS | Performed by: NURSE PRACTITIONER

## 2020-11-03 PROCEDURE — 6360000002 HC RX W HCPCS: Performed by: NURSE PRACTITIONER

## 2020-11-03 PROCEDURE — 2720000010 HC SURG SUPPLY STERILE: Performed by: SURGERY

## 2020-11-03 PROCEDURE — G0378 HOSPITAL OBSERVATION PER HR: HCPCS

## 2020-11-03 PROCEDURE — 87070 CULTURE OTHR SPECIMN AEROBIC: CPT

## 2020-11-03 PROCEDURE — 87015 SPECIMEN INFECT AGNT CONCNTJ: CPT

## 2020-11-03 PROCEDURE — 1200000000 HC SEMI PRIVATE

## 2020-11-03 PROCEDURE — 2500000003 HC RX 250 WO HCPCS: Performed by: NURSE ANESTHETIST, CERTIFIED REGISTERED

## 2020-11-03 PROCEDURE — 0FT44ZZ RESECTION OF GALLBLADDER, PERCUTANEOUS ENDOSCOPIC APPROACH: ICD-10-PCS | Performed by: SURGERY

## 2020-11-03 PROCEDURE — 84703 CHORIONIC GONADOTROPIN ASSAY: CPT

## 2020-11-03 PROCEDURE — 87116 MYCOBACTERIA CULTURE: CPT

## 2020-11-03 PROCEDURE — 2709999900 HC NON-CHARGEABLE SUPPLY: Performed by: SURGERY

## 2020-11-03 PROCEDURE — 2500000003 HC RX 250 WO HCPCS: Performed by: SURGERY

## 2020-11-03 PROCEDURE — 3700000000 HC ANESTHESIA ATTENDED CARE: Performed by: SURGERY

## 2020-11-03 PROCEDURE — 87205 SMEAR GRAM STAIN: CPT

## 2020-11-03 PROCEDURE — 99285 EMERGENCY DEPT VISIT HI MDM: CPT

## 2020-11-03 PROCEDURE — 96374 THER/PROPH/DIAG INJ IV PUSH: CPT

## 2020-11-03 PROCEDURE — 85025 COMPLETE CBC W/AUTO DIFF WBC: CPT

## 2020-11-03 PROCEDURE — 7100000001 HC PACU RECOVERY - ADDTL 15 MIN: Performed by: SURGERY

## 2020-11-03 PROCEDURE — 96375 TX/PRO/DX INJ NEW DRUG ADDON: CPT

## 2020-11-03 PROCEDURE — 76705 ECHO EXAM OF ABDOMEN: CPT

## 2020-11-03 PROCEDURE — 81001 URINALYSIS AUTO W/SCOPE: CPT

## 2020-11-03 PROCEDURE — 87102 FUNGUS ISOLATION CULTURE: CPT

## 2020-11-03 PROCEDURE — 2580000003 HC RX 258: Performed by: NURSE PRACTITIONER

## 2020-11-03 PROCEDURE — 3700000001 HC ADD 15 MINUTES (ANESTHESIA): Performed by: SURGERY

## 2020-11-03 PROCEDURE — 87206 SMEAR FLUORESCENT/ACID STAI: CPT

## 2020-11-03 PROCEDURE — BF121ZZ FLUOROSCOPY OF GALLBLADDER USING LOW OSMOLAR CONTRAST: ICD-10-PCS | Performed by: SURGERY

## 2020-11-03 PROCEDURE — 74177 CT ABD & PELVIS W/CONTRAST: CPT

## 2020-11-03 PROCEDURE — 96376 TX/PRO/DX INJ SAME DRUG ADON: CPT

## 2020-11-03 RX ORDER — METOCLOPRAMIDE HYDROCHLORIDE 5 MG/ML
10 INJECTION INTRAMUSCULAR; INTRAVENOUS ONCE
Status: COMPLETED | OUTPATIENT
Start: 2020-11-03 | End: 2020-11-03

## 2020-11-03 RX ORDER — SODIUM CHLORIDE 0.9 % (FLUSH) 0.9 %
10 SYRINGE (ML) INJECTION PRN
Status: DISCONTINUED | OUTPATIENT
Start: 2020-11-03 | End: 2020-11-03 | Stop reason: SDUPTHER

## 2020-11-03 RX ORDER — MORPHINE SULFATE 4 MG/ML
4 INJECTION, SOLUTION INTRAMUSCULAR; INTRAVENOUS
Status: DISCONTINUED | OUTPATIENT
Start: 2020-11-03 | End: 2020-11-04

## 2020-11-03 RX ORDER — HYDROMORPHONE HCL 110MG/55ML
0.5 PATIENT CONTROLLED ANALGESIA SYRINGE INTRAVENOUS EVERY 5 MIN PRN
Status: DISCONTINUED | OUTPATIENT
Start: 2020-11-03 | End: 2020-11-03 | Stop reason: HOSPADM

## 2020-11-03 RX ORDER — FENTANYL CITRATE 50 UG/ML
50 INJECTION, SOLUTION INTRAMUSCULAR; INTRAVENOUS EVERY 5 MIN PRN
Status: DISCONTINUED | OUTPATIENT
Start: 2020-11-03 | End: 2020-11-03 | Stop reason: HOSPADM

## 2020-11-03 RX ORDER — PROPOFOL 10 MG/ML
INJECTION, EMULSION INTRAVENOUS PRN
Status: DISCONTINUED | OUTPATIENT
Start: 2020-11-03 | End: 2020-11-03 | Stop reason: SDUPTHER

## 2020-11-03 RX ORDER — DEXTROSE, SODIUM CHLORIDE, AND POTASSIUM CHLORIDE 5; .45; .15 G/100ML; G/100ML; G/100ML
INJECTION INTRAVENOUS CONTINUOUS
Status: DISCONTINUED | OUTPATIENT
Start: 2020-11-03 | End: 2020-11-04

## 2020-11-03 RX ORDER — ONDANSETRON 2 MG/ML
4 INJECTION INTRAMUSCULAR; INTRAVENOUS ONCE
Status: DISCONTINUED | OUTPATIENT
Start: 2020-11-03 | End: 2020-11-03

## 2020-11-03 RX ORDER — ONDANSETRON 2 MG/ML
INJECTION INTRAMUSCULAR; INTRAVENOUS PRN
Status: DISCONTINUED | OUTPATIENT
Start: 2020-11-03 | End: 2020-11-03 | Stop reason: SDUPTHER

## 2020-11-03 RX ORDER — SODIUM CHLORIDE 9 MG/ML
INJECTION, SOLUTION INTRAVENOUS CONTINUOUS
Status: DISCONTINUED | OUTPATIENT
Start: 2020-11-03 | End: 2020-11-04

## 2020-11-03 RX ORDER — OXYCODONE HYDROCHLORIDE 5 MG/1
5 TABLET ORAL EVERY 4 HOURS PRN
Status: DISCONTINUED | OUTPATIENT
Start: 2020-11-03 | End: 2020-11-04 | Stop reason: HOSPADM

## 2020-11-03 RX ORDER — SUCCINYLCHOLINE CHLORIDE 20 MG/ML
INJECTION INTRAMUSCULAR; INTRAVENOUS PRN
Status: DISCONTINUED | OUTPATIENT
Start: 2020-11-03 | End: 2020-11-03 | Stop reason: SDUPTHER

## 2020-11-03 RX ORDER — ONDANSETRON 4 MG/1
4 TABLET, ORALLY DISINTEGRATING ORAL EVERY 8 HOURS PRN
Status: DISCONTINUED | OUTPATIENT
Start: 2020-11-03 | End: 2020-11-04 | Stop reason: HOSPADM

## 2020-11-03 RX ORDER — FENTANYL CITRATE 50 UG/ML
INJECTION, SOLUTION INTRAMUSCULAR; INTRAVENOUS PRN
Status: DISCONTINUED | OUTPATIENT
Start: 2020-11-03 | End: 2020-11-03 | Stop reason: SDUPTHER

## 2020-11-03 RX ORDER — HYDROMORPHONE HCL 110MG/55ML
0.25 PATIENT CONTROLLED ANALGESIA SYRINGE INTRAVENOUS EVERY 5 MIN PRN
Status: DISCONTINUED | OUTPATIENT
Start: 2020-11-03 | End: 2020-11-03 | Stop reason: HOSPADM

## 2020-11-03 RX ORDER — LIDOCAINE HYDROCHLORIDE 20 MG/ML
INJECTION, SOLUTION INFILTRATION; PERINEURAL PRN
Status: DISCONTINUED | OUTPATIENT
Start: 2020-11-03 | End: 2020-11-03 | Stop reason: SDUPTHER

## 2020-11-03 RX ORDER — OXYCODONE HYDROCHLORIDE 5 MG/1
10 TABLET ORAL PRN
Status: DISCONTINUED | OUTPATIENT
Start: 2020-11-03 | End: 2020-11-03 | Stop reason: HOSPADM

## 2020-11-03 RX ORDER — OXYCODONE HYDROCHLORIDE 5 MG/1
10 TABLET ORAL EVERY 4 HOURS PRN
Status: DISCONTINUED | OUTPATIENT
Start: 2020-11-03 | End: 2020-11-04 | Stop reason: HOSPADM

## 2020-11-03 RX ORDER — DEXAMETHASONE SODIUM PHOSPHATE 4 MG/ML
INJECTION, SOLUTION INTRA-ARTICULAR; INTRALESIONAL; INTRAMUSCULAR; INTRAVENOUS; SOFT TISSUE PRN
Status: DISCONTINUED | OUTPATIENT
Start: 2020-11-03 | End: 2020-11-03 | Stop reason: SDUPTHER

## 2020-11-03 RX ORDER — SODIUM CHLORIDE 0.9 % (FLUSH) 0.9 %
10 SYRINGE (ML) INJECTION EVERY 12 HOURS SCHEDULED
Status: DISCONTINUED | OUTPATIENT
Start: 2020-11-03 | End: 2020-11-04 | Stop reason: HOSPADM

## 2020-11-03 RX ORDER — PANTOPRAZOLE SODIUM 40 MG/10ML
40 INJECTION, POWDER, LYOPHILIZED, FOR SOLUTION INTRAVENOUS DAILY
Status: DISCONTINUED | OUTPATIENT
Start: 2020-11-03 | End: 2020-11-04 | Stop reason: HOSPADM

## 2020-11-03 RX ORDER — SODIUM CHLORIDE 9 MG/ML
10 INJECTION INTRAVENOUS DAILY
Status: DISCONTINUED | OUTPATIENT
Start: 2020-11-03 | End: 2020-11-04 | Stop reason: HOSPADM

## 2020-11-03 RX ORDER — MAGNESIUM HYDROXIDE 1200 MG/15ML
LIQUID ORAL CONTINUOUS PRN
Status: COMPLETED | OUTPATIENT
Start: 2020-11-03 | End: 2020-11-03

## 2020-11-03 RX ORDER — DIPHENHYDRAMINE HYDROCHLORIDE 50 MG/ML
12.5 INJECTION INTRAMUSCULAR; INTRAVENOUS
Status: DISCONTINUED | OUTPATIENT
Start: 2020-11-03 | End: 2020-11-03 | Stop reason: HOSPADM

## 2020-11-03 RX ORDER — MEPERIDINE HYDROCHLORIDE 25 MG/ML
12.5 INJECTION INTRAMUSCULAR; INTRAVENOUS; SUBCUTANEOUS EVERY 5 MIN PRN
Status: DISCONTINUED | OUTPATIENT
Start: 2020-11-03 | End: 2020-11-03 | Stop reason: HOSPADM

## 2020-11-03 RX ORDER — DIPHENHYDRAMINE HYDROCHLORIDE 50 MG/ML
25 INJECTION INTRAMUSCULAR; INTRAVENOUS ONCE
Status: COMPLETED | OUTPATIENT
Start: 2020-11-03 | End: 2020-11-03

## 2020-11-03 RX ORDER — BUPIVACAINE HYDROCHLORIDE AND EPINEPHRINE 5; 5 MG/ML; UG/ML
INJECTION, SOLUTION EPIDURAL; INTRACAUDAL; PERINEURAL
Status: COMPLETED | OUTPATIENT
Start: 2020-11-03 | End: 2020-11-03

## 2020-11-03 RX ORDER — GLYCOPYRROLATE 0.2 MG/ML
INJECTION INTRAMUSCULAR; INTRAVENOUS PRN
Status: DISCONTINUED | OUTPATIENT
Start: 2020-11-03 | End: 2020-11-03 | Stop reason: SDUPTHER

## 2020-11-03 RX ORDER — PROMETHAZINE HYDROCHLORIDE 25 MG/ML
25 INJECTION, SOLUTION INTRAMUSCULAR; INTRAVENOUS ONCE
Status: COMPLETED | OUTPATIENT
Start: 2020-11-03 | End: 2020-11-03

## 2020-11-03 RX ORDER — PROMETHAZINE HYDROCHLORIDE 25 MG/ML
12.5 INJECTION, SOLUTION INTRAMUSCULAR; INTRAVENOUS EVERY 6 HOURS PRN
Status: DISCONTINUED | OUTPATIENT
Start: 2020-11-03 | End: 2020-11-04 | Stop reason: HOSPADM

## 2020-11-03 RX ORDER — SCOLOPAMINE TRANSDERMAL SYSTEM 1 MG/1
1 PATCH, EXTENDED RELEASE TRANSDERMAL ONCE
Status: DISCONTINUED | OUTPATIENT
Start: 2020-11-03 | End: 2020-11-04 | Stop reason: HOSPADM

## 2020-11-03 RX ORDER — ROCURONIUM BROMIDE 10 MG/ML
INJECTION, SOLUTION INTRAVENOUS PRN
Status: DISCONTINUED | OUTPATIENT
Start: 2020-11-03 | End: 2020-11-03 | Stop reason: SDUPTHER

## 2020-11-03 RX ORDER — SODIUM CHLORIDE, SODIUM LACTATE, POTASSIUM CHLORIDE, AND CALCIUM CHLORIDE .6; .31; .03; .02 G/100ML; G/100ML; G/100ML; G/100ML
IRRIGANT IRRIGATION
Status: COMPLETED | OUTPATIENT
Start: 2020-11-03 | End: 2020-11-03

## 2020-11-03 RX ORDER — MORPHINE SULFATE 2 MG/ML
2 INJECTION, SOLUTION INTRAMUSCULAR; INTRAVENOUS
Status: DISCONTINUED | OUTPATIENT
Start: 2020-11-03 | End: 2020-11-03 | Stop reason: SDUPTHER

## 2020-11-03 RX ORDER — ONDANSETRON 2 MG/ML
4 INJECTION INTRAMUSCULAR; INTRAVENOUS EVERY 6 HOURS PRN
Status: DISCONTINUED | OUTPATIENT
Start: 2020-11-03 | End: 2020-11-03 | Stop reason: SDUPTHER

## 2020-11-03 RX ORDER — PROMETHAZINE HYDROCHLORIDE 25 MG/ML
6.25 INJECTION, SOLUTION INTRAMUSCULAR; INTRAVENOUS PRN
Status: DISCONTINUED | OUTPATIENT
Start: 2020-11-03 | End: 2020-11-03 | Stop reason: HOSPADM

## 2020-11-03 RX ORDER — MORPHINE SULFATE 4 MG/ML
4 INJECTION, SOLUTION INTRAMUSCULAR; INTRAVENOUS
Status: DISCONTINUED | OUTPATIENT
Start: 2020-11-03 | End: 2020-11-03 | Stop reason: SDUPTHER

## 2020-11-03 RX ORDER — ONDANSETRON 4 MG/1
4 TABLET, ORALLY DISINTEGRATING ORAL EVERY 8 HOURS PRN
Status: DISCONTINUED | OUTPATIENT
Start: 2020-11-03 | End: 2020-11-03 | Stop reason: SDUPTHER

## 2020-11-03 RX ORDER — SODIUM CHLORIDE, SODIUM LACTATE, POTASSIUM CHLORIDE, CALCIUM CHLORIDE 600; 310; 30; 20 MG/100ML; MG/100ML; MG/100ML; MG/100ML
INJECTION, SOLUTION INTRAVENOUS CONTINUOUS PRN
Status: DISCONTINUED | OUTPATIENT
Start: 2020-11-03 | End: 2020-11-03 | Stop reason: SDUPTHER

## 2020-11-03 RX ORDER — MIDAZOLAM HYDROCHLORIDE 1 MG/ML
INJECTION INTRAMUSCULAR; INTRAVENOUS PRN
Status: DISCONTINUED | OUTPATIENT
Start: 2020-11-03 | End: 2020-11-03 | Stop reason: SDUPTHER

## 2020-11-03 RX ORDER — SODIUM CHLORIDE 0.9 % (FLUSH) 0.9 %
10 SYRINGE (ML) INJECTION EVERY 12 HOURS SCHEDULED
Status: DISCONTINUED | OUTPATIENT
Start: 2020-11-03 | End: 2020-11-03 | Stop reason: SDUPTHER

## 2020-11-03 RX ORDER — NEOSTIGMINE METHYLSULFATE 5 MG/5 ML
SYRINGE (ML) INTRAVENOUS PRN
Status: DISCONTINUED | OUTPATIENT
Start: 2020-11-03 | End: 2020-11-03 | Stop reason: SDUPTHER

## 2020-11-03 RX ORDER — MORPHINE SULFATE 2 MG/ML
2 INJECTION, SOLUTION INTRAMUSCULAR; INTRAVENOUS
Status: DISCONTINUED | OUTPATIENT
Start: 2020-11-03 | End: 2020-11-04

## 2020-11-03 RX ORDER — MORPHINE SULFATE 4 MG/ML
4 INJECTION, SOLUTION INTRAMUSCULAR; INTRAVENOUS EVERY 30 MIN PRN
Status: DISCONTINUED | OUTPATIENT
Start: 2020-11-03 | End: 2020-11-03

## 2020-11-03 RX ORDER — ONDANSETRON 2 MG/ML
4 INJECTION INTRAMUSCULAR; INTRAVENOUS EVERY 6 HOURS PRN
Status: DISCONTINUED | OUTPATIENT
Start: 2020-11-03 | End: 2020-11-04 | Stop reason: HOSPADM

## 2020-11-03 RX ORDER — OXYCODONE HYDROCHLORIDE 5 MG/1
5 TABLET ORAL PRN
Status: DISCONTINUED | OUTPATIENT
Start: 2020-11-03 | End: 2020-11-03 | Stop reason: HOSPADM

## 2020-11-03 RX ORDER — ACETAMINOPHEN 325 MG/1
650 TABLET ORAL EVERY 4 HOURS PRN
Status: DISCONTINUED | OUTPATIENT
Start: 2020-11-03 | End: 2020-11-04 | Stop reason: HOSPADM

## 2020-11-03 RX ORDER — ACETAMINOPHEN 325 MG/1
650 TABLET ORAL EVERY 6 HOURS
Status: DISCONTINUED | OUTPATIENT
Start: 2020-11-03 | End: 2020-11-04 | Stop reason: HOSPADM

## 2020-11-03 RX ORDER — DEXTROSE, SODIUM CHLORIDE, AND POTASSIUM CHLORIDE 5; .45; .15 G/100ML; G/100ML; G/100ML
INJECTION INTRAVENOUS
Status: COMPLETED
Start: 2020-11-03 | End: 2020-11-03

## 2020-11-03 RX ORDER — LABETALOL HYDROCHLORIDE 5 MG/ML
5 INJECTION, SOLUTION INTRAVENOUS EVERY 10 MIN PRN
Status: DISCONTINUED | OUTPATIENT
Start: 2020-11-03 | End: 2020-11-03 | Stop reason: HOSPADM

## 2020-11-03 RX ORDER — 0.9 % SODIUM CHLORIDE 0.9 %
1000 INTRAVENOUS SOLUTION INTRAVENOUS ONCE
Status: COMPLETED | OUTPATIENT
Start: 2020-11-03 | End: 2020-11-03

## 2020-11-03 RX ORDER — SODIUM CHLORIDE 0.9 % (FLUSH) 0.9 %
10 SYRINGE (ML) INJECTION PRN
Status: DISCONTINUED | OUTPATIENT
Start: 2020-11-03 | End: 2020-11-04 | Stop reason: HOSPADM

## 2020-11-03 RX ORDER — KETOROLAC TROMETHAMINE 30 MG/ML
30 INJECTION, SOLUTION INTRAMUSCULAR; INTRAVENOUS ONCE
Status: COMPLETED | OUTPATIENT
Start: 2020-11-03 | End: 2020-11-03

## 2020-11-03 RX ADMIN — FENTANYL CITRATE 50 MCG: 50 INJECTION INTRAMUSCULAR; INTRAVENOUS at 15:57

## 2020-11-03 RX ADMIN — SUCCINYLCHOLINE CHLORIDE 120 MG: 20 INJECTION, SOLUTION INTRAMUSCULAR; INTRAVENOUS at 15:26

## 2020-11-03 RX ADMIN — ROCURONIUM BROMIDE 20 MG: 10 INJECTION, SOLUTION INTRAVENOUS at 15:34

## 2020-11-03 RX ADMIN — DEXTROSE, SODIUM CHLORIDE, AND POTASSIUM CHLORIDE 1000 ML: 5; .45; .15 INJECTION INTRAVENOUS at 13:57

## 2020-11-03 RX ADMIN — MORPHINE SULFATE 2 MG: 2 INJECTION, SOLUTION INTRAMUSCULAR; INTRAVENOUS at 19:44

## 2020-11-03 RX ADMIN — Medication 10 ML: at 19:40

## 2020-11-03 RX ADMIN — PROPOFOL 150 MG: 10 INJECTION, EMULSION INTRAVENOUS at 15:26

## 2020-11-03 RX ADMIN — ONDANSETRON 4 MG: 2 INJECTION INTRAMUSCULAR; INTRAVENOUS at 15:40

## 2020-11-03 RX ADMIN — PANTOPRAZOLE SODIUM 40 MG: 40 INJECTION, POWDER, FOR SOLUTION INTRAVENOUS at 19:40

## 2020-11-03 RX ADMIN — PIPERACILLIN AND TAZOBACTAM 3.38 G: 3; .375 INJECTION, POWDER, LYOPHILIZED, FOR SOLUTION INTRAVENOUS at 19:41

## 2020-11-03 RX ADMIN — SODIUM CHLORIDE, POTASSIUM CHLORIDE, SODIUM LACTATE AND CALCIUM CHLORIDE: 600; 310; 30; 20 INJECTION, SOLUTION INTRAVENOUS at 16:21

## 2020-11-03 RX ADMIN — DEXAMETHASONE SODIUM PHOSPHATE 8 MG: 4 INJECTION, SOLUTION INTRAMUSCULAR; INTRAVENOUS at 15:40

## 2020-11-03 RX ADMIN — PROMETHAZINE HYDROCHLORIDE 25 MG: 25 INJECTION INTRAMUSCULAR; INTRAVENOUS at 08:02

## 2020-11-03 RX ADMIN — GLYCOPYRROLATE 0.4 MG: 0.2 INJECTION, SOLUTION INTRAMUSCULAR; INTRAVENOUS at 16:18

## 2020-11-03 RX ADMIN — MIDAZOLAM 2 MG: 1 INJECTION INTRAMUSCULAR; INTRAVENOUS at 15:17

## 2020-11-03 RX ADMIN — KETOROLAC TROMETHAMINE 30 MG: 30 INJECTION, SOLUTION INTRAMUSCULAR at 09:12

## 2020-11-03 RX ADMIN — SODIUM CHLORIDE: 9 INJECTION, SOLUTION INTRAVENOUS at 19:52

## 2020-11-03 RX ADMIN — MORPHINE SULFATE 4 MG: 4 INJECTION INTRAVENOUS at 07:48

## 2020-11-03 RX ADMIN — MORPHINE SULFATE 2 MG: 2 INJECTION, SOLUTION INTRAMUSCULAR; INTRAVENOUS at 13:00

## 2020-11-03 RX ADMIN — MORPHINE SULFATE 4 MG: 4 INJECTION INTRAVENOUS at 09:12

## 2020-11-03 RX ADMIN — FENTANYL CITRATE 50 MCG: 50 INJECTION INTRAMUSCULAR; INTRAVENOUS at 15:26

## 2020-11-03 RX ADMIN — DIPHENHYDRAMINE HYDROCHLORIDE 25 MG: 50 INJECTION, SOLUTION INTRAMUSCULAR; INTRAVENOUS at 09:11

## 2020-11-03 RX ADMIN — ROCURONIUM BROMIDE 10 MG: 10 INJECTION, SOLUTION INTRAVENOUS at 15:37

## 2020-11-03 RX ADMIN — PIPERACILLIN AND TAZOBACTAM 3.38 G: 3; .375 INJECTION, POWDER, LYOPHILIZED, FOR SOLUTION INTRAVENOUS at 12:00

## 2020-11-03 RX ADMIN — ONDANSETRON 4 MG: 2 INJECTION INTRAMUSCULAR; INTRAVENOUS at 14:00

## 2020-11-03 RX ADMIN — ACETAMINOPHEN 650 MG: 325 TABLET ORAL at 19:40

## 2020-11-03 RX ADMIN — FENTANYL CITRATE 50 MCG: 50 INJECTION INTRAMUSCULAR; INTRAVENOUS at 16:30

## 2020-11-03 RX ADMIN — SODIUM CHLORIDE, POTASSIUM CHLORIDE, SODIUM LACTATE AND CALCIUM CHLORIDE: 600; 310; 30; 20 INJECTION, SOLUTION INTRAVENOUS at 15:19

## 2020-11-03 RX ADMIN — SODIUM CHLORIDE 1000 ML: 9 INJECTION, SOLUTION INTRAVENOUS at 07:48

## 2020-11-03 RX ADMIN — IOPAMIDOL 75 ML: 755 INJECTION, SOLUTION INTRAVENOUS at 09:46

## 2020-11-03 RX ADMIN — LIDOCAINE HYDROCHLORIDE 100 MG: 20 INJECTION, SOLUTION INFILTRATION; PERINEURAL at 15:26

## 2020-11-03 RX ADMIN — POTASSIUM CHLORIDE, DEXTROSE MONOHYDRATE AND SODIUM CHLORIDE 1000 ML: 150; 5; 450 INJECTION, SOLUTION INTRAVENOUS at 13:57

## 2020-11-03 RX ADMIN — HYDROMORPHONE HYDROCHLORIDE 0.5 MG: 2 INJECTION, SOLUTION INTRAMUSCULAR; INTRAVENOUS; SUBCUTANEOUS at 18:01

## 2020-11-03 RX ADMIN — Medication 4 MG: at 16:18

## 2020-11-03 RX ADMIN — PHENYLEPHRINE HYDROCHLORIDE 200 MCG: 10 INJECTION INTRAVENOUS at 15:44

## 2020-11-03 RX ADMIN — METOCLOPRAMIDE 10 MG: 5 INJECTION, SOLUTION INTRAMUSCULAR; INTRAVENOUS at 09:12

## 2020-11-03 ASSESSMENT — PULMONARY FUNCTION TESTS
PIF_VALUE: 33
PIF_VALUE: 33
PIF_VALUE: 3
PIF_VALUE: 25
PIF_VALUE: 22
PIF_VALUE: 23
PIF_VALUE: 15
PIF_VALUE: 33
PIF_VALUE: 22
PIF_VALUE: 22
PIF_VALUE: 2
PIF_VALUE: 15
PIF_VALUE: 29
PIF_VALUE: 7
PIF_VALUE: 33
PIF_VALUE: 32
PIF_VALUE: 32
PIF_VALUE: 14
PIF_VALUE: 32
PIF_VALUE: 2
PIF_VALUE: 33
PIF_VALUE: 1
PIF_VALUE: 22
PIF_VALUE: 21
PIF_VALUE: 33
PIF_VALUE: 2
PIF_VALUE: 33
PIF_VALUE: 33
PIF_VALUE: 32
PIF_VALUE: 22
PIF_VALUE: 7
PIF_VALUE: 32
PIF_VALUE: 32
PIF_VALUE: 30
PIF_VALUE: 22
PIF_VALUE: 16
PIF_VALUE: 22
PIF_VALUE: 33
PIF_VALUE: 25
PIF_VALUE: 22
PIF_VALUE: 33
PIF_VALUE: 32
PIF_VALUE: 23
PIF_VALUE: 2
PIF_VALUE: 20
PIF_VALUE: 0
PIF_VALUE: 1
PIF_VALUE: 3
PIF_VALUE: 2
PIF_VALUE: 32
PIF_VALUE: 23
PIF_VALUE: 31
PIF_VALUE: 23
PIF_VALUE: 32
PIF_VALUE: 22
PIF_VALUE: 23
PIF_VALUE: 33
PIF_VALUE: 32
PIF_VALUE: 0
PIF_VALUE: 21
PIF_VALUE: 32
PIF_VALUE: 23
PIF_VALUE: 30
PIF_VALUE: 33
PIF_VALUE: 22

## 2020-11-03 ASSESSMENT — ENCOUNTER SYMPTOMS
DIARRHEA: 0
COUGH: 0
NAUSEA: 1
SHORTNESS OF BREATH: 0
VOMITING: 1
ABDOMINAL PAIN: 1
RHINORRHEA: 0

## 2020-11-03 ASSESSMENT — PAIN DESCRIPTION - PAIN TYPE
TYPE: ACUTE PAIN
TYPE: ACUTE PAIN
TYPE: SURGICAL PAIN
TYPE: ACUTE PAIN
TYPE: ACUTE PAIN

## 2020-11-03 ASSESSMENT — PAIN SCALES - GENERAL
PAINLEVEL_OUTOF10: 10
PAINLEVEL_OUTOF10: 4
PAINLEVEL_OUTOF10: 5
PAINLEVEL_OUTOF10: 5
PAINLEVEL_OUTOF10: 6
PAINLEVEL_OUTOF10: 10
PAINLEVEL_OUTOF10: 8
PAINLEVEL_OUTOF10: 5
PAINLEVEL_OUTOF10: 5
PAINLEVEL_OUTOF10: 2
PAINLEVEL_OUTOF10: 10
PAINLEVEL_OUTOF10: 6
PAINLEVEL_OUTOF10: 5
PAINLEVEL_OUTOF10: 5

## 2020-11-03 ASSESSMENT — PAIN DESCRIPTION - LOCATION
LOCATION: ABDOMEN

## 2020-11-03 ASSESSMENT — PAIN DESCRIPTION - ORIENTATION
ORIENTATION: MID
ORIENTATION: MID

## 2020-11-03 ASSESSMENT — PAIN DESCRIPTION - PROGRESSION
CLINICAL_PROGRESSION: GRADUALLY WORSENING
CLINICAL_PROGRESSION: GRADUALLY IMPROVING

## 2020-11-03 ASSESSMENT — PAIN DESCRIPTION - DESCRIPTORS: DESCRIPTORS: SORE

## 2020-11-03 ASSESSMENT — PAIN - FUNCTIONAL ASSESSMENT: PAIN_FUNCTIONAL_ASSESSMENT: 0-10

## 2020-11-03 NOTE — ED NOTES
Patient return from Ultrasound, reports increased pain and nausea.       Andrea Potts RN  11/03/20 9211

## 2020-11-03 NOTE — ED PROVIDER NOTES
[facility]        Pt Name: Enedelia Nair  MRN: [id number]  [place] 1975  Date of evaluation: 11/3/2020  Provider: Nolvia Disla PA-C  PCP: Sandra Carlton MD     I have seen and evaluated this patient with my supervising physician Prema Anglin, [facility]       Chief Complaint   Patient presents with    Abdominal Pain     patient reports she has gall stones dx in May. HISTORY OF PRESENT ILLNESS   (Location, Timing/Onset, Context/Setting, Quality, Duration, Modifying Factors, Severity, Associated Signs and Symptoms)  Note limiting factors. Enedelia Nair is a 45 y.o. female who presents to the emergency department today for evaluation for nausea x1 week as well as abdominal pain. The patient states that she has a history of gastroparesis as well as gallstones. She states that she actually saw Dr. Modesta Mercer, general surgery in July of this year. They discussed a cholecystectomy however she states that she did the gastric emptying study was found that she had gastroparesis as well. The patient states that she has had increasing nausea for the past week, and she states that starting at 630 last night she had sharp pains to her right upper abdomen. She is rating her pain as a 10/10. She denies any known alleviating or aggravating factors. She states that she had increasing nausea as well as several episodes of emesis. She denies any bilious emesis, hematemesis or coffee-ground emesis. She states that she has had some diarrhea and she states that it is \"fatty stool\". She denies any blood in the stool black tarry appearance to the stool. He denies any chest pain or shortness of breath. No fever chills per no cough or congestion. No urinary symptoms. He has a past surgical history of a complete hysterectomy and oophorectomy the patient otherwise has no complaints at this time.     Nursing Notes were all reviewed and agreed with or any disagreements were addressed in the HPI. REVIEW OF SYSTEMS    (2-9 systems for level 4, 10 or more for level 5)     Review of Systems   Constitutional: Negative for activity change, appetite change, chills and fever. HENT: Negative for congestion and rhinorrhea. Respiratory: Negative for cough and shortness of breath. Cardiovascular: Negative for chest pain. Gastrointestinal: Positive for abdominal pain, nausea and vomiting. Negative for diarrhea. Genitourinary: Negative for difficulty urinating, dysuria and hematuria. Positives and Pertinent negatives as per HPI. Except as noted above in the ROS, all other systems were reviewed and negative.        PAST MEDICAL HISTORY     Past Medical History:   Diagnosis Date    Gastroparesis     GERD (gastroesophageal reflux disease)     H/O myringotomy     w/tube placement     Sleep apnea syndrome     no c-pap    Tobacco use disorder          SURGICAL HISTORY     Past Surgical History:   Procedure Laterality Date    ADENOIDECTOMY      COLONOSCOPY N/A 7/9/2020    COLONOSCOPY POLYPECTOMY SNARE/COLD BIOPSY performed by Clarisa Medina MD at [address] [place] [address]  7/9/2020    COLONOSCOPY WITH BIOPSY performed by Clarisa [facility]ina [facility] at [facility]  11/30/11    DILATION AND CURETTAGE OF UTERUS  5/24/12    HYSTERECTOMY, TOTAL ABDOMINAL  04/05/2018    total hysterectomy, BSO    TYMPANOSTOMY TUBE PLACEMENT      UPPER GASTROINTESTINAL ENDOSCOPY N/A 7/9/2020    EGD BIOPSY performed by Clarisa Medina MD at [address] [place] [address]       Previous Medications    MECLIZINE (ANTIVERT) 25 MG TABLET    Take 1 tablet by mouth 3 times daily as needed for Dizziness    NAPROXEN SODIUM (ALEVE) 220 MG TABLET    Take 2 tablets by mouth 2 times daily as needed     PROMETHAZINE (PHENERGAN) 25 MG TABLET    Take 25 mg by mouth 4 times daily as needed for Nausea ALLERGIES     Patient has no known allergies. FAMILYHISTORY       Family History   Problem Relation Age of Onset    Heart Disease Father 39    High Blood Pressure Mother     High Cholesterol Mother     Cancer Other     Alcohol Abuse Other     Stroke Other     High Blood Pressure Other     Lung Cancer Maternal Grandmother 60        +smoker    Colon Cancer Maternal Grandmother     No Known Problems Brother     No Known Problems Brother     No Known Problems Brother           SOCIAL HISTORY       Social History     Tobacco Use    Smoking status: Current Every Day Smoker     Packs/day: 0.50     Years: 14.00     Pack years: 7.00     Types: Cigarettes    Smokeless tobacco: Never Used   Substance Use Topics    Alcohol use: No     Comment: rare    Drug use: No       SCREENINGS             PHYSICAL EXAM    (up to 7 for level 4, 8 or more for level 5)     ED Triage Vitals [11/03/20 0720]   BP Temp Temp src Pulse Resp SpO2 Height Weight   (!) 133/91 -- -- 111 24 100 % 4' 11\" (1.499 m) 170 lb (77.1 kg)       Physical Exam  Vitals signs and nursing note reviewed. Constitutional:       Appearance: She is well-developed. She is not diaphoretic. HENT:      Head: Normocephalic and atraumatic. Right Ear: External ear normal.      Left Ear: External ear normal.      Nose: Nose normal.   Eyes:      General:         Right eye: No discharge. Left eye: No discharge. Neck:      Musculoskeletal: Normal range of motion and neck supple. Trachea: No tracheal deviation. Cardiovascular:      Rate and Rhythm: Normal rate and regular rhythm. Heart sounds: No murmur. Pulmonary:      Effort: Pulmonary effort is normal. No respiratory distress. Breath sounds: Normal breath sounds. No wheezing or rales. Abdominal:      General: Bowel sounds are normal. There is no distension. Palpations: Abdomen is soft. Tenderness: There is abdominal tenderness in the right upper quadrant. There is no guarding or rebound. Comments: tenderness right upper abdomen with some voluntary guarding. Musculoskeletal: Normal range of motion. Skin:     General: Skin is warm and dry. Neurological:      Mental Status: She is alert and oriented to person, place, and time. Psychiatric:         Behavior: Behavior normal.         DIAGNOSTIC RESULTS   LABS:    Labs Reviewed   CBC WITH AUTO DIFFERENTIAL - Abnormal; Notable for the following components:       Result Value    WBC 11.7 (*)     Neutrophils Absolute 8.6 (*)     All other components within normal limits    Narrative:     Performed at:  [facility] [address] [facility] [employer]   Phone [phone]   COMPREHENSIVE METABOLIC PANEL - Abnormal; Notable for the following components:    Glucose 104 (*)     CREATININE <0.5 (*)     Alb 5.1 (*)     All other components within normal limits    Narrative:     Performed at:  [facility] [address] [facility] [employer]   Phone [phone]   URINE RT REFLEX TO CULTURE - Abnormal; Notable for the following components:    Ketones, Urine 15 (*)     Protein, UA TRACE (*)     All other components within normal limits    Narrative:     Performed at:  [facility] [address] [facility] [employer]   Phone [phone]   LIPASE    Narrative:     Performed at:  [facility] [address] [facility] [employer]   Phone [phone]   HCG, SERUM, QUALITATIVE    Narrative:     Performed at:  [facility] [address] [facility] [employer], [employer]   Phone [phone]   MICROSCOPIC URINALYSIS    Narrative:     Performed at:  [facility] [address] [facility] [employer]   Phone [phone]       All other labs were within normal range or not returned as of this dictation. EKG:  All EKG's are interpreted by the Emergency Department Physician in the absence of a cardiologist.  Please see their note for interpretation of EKG. RADIOLOGY:   Non-plain film images such as CT, Ultrasound and MRI are read by the radiologist. Plain radiographic images are visualized and preliminarily interpreted by the ED Provider with the below findings:        Interpretation per the Radiologist below, if available at the time of this note:    CT ABDOMEN PELVIS W IV CONTRAST Additional Contrast? None   Preliminary Result   1. Gallstones and gallbladder sludge with irregularity and thickening of the   gallbladder wall concerning for acute cholecystitis. Further evaluation with   nuclear medicine HIDA scan recommended. 2. Suspected 2 cm solid mass lesion at the inferior pole of the left kidney   concerning for solid renal mass/renal cell carcinoma. Initial further   evaluation with renal ultrasound is recommended. Eventual further evaluation   with CT or MR renal mass protocol will likely be necessary. 3. Mild intrahepatic biliary ductal dilation. 4. Old granulomatous disease. 5. Small midline fat-containing periumbilical hernia. 6. Normal appendix. 7. Small hiatal hernia. 8. Fatty liver. US GALLBLADDER RUQ   Final Result   Somewhat limited study due to technique and patient cooperation. Gallstones seen with slight prominence of the gallbladder wall. There was a   negative sonographic Dick's sign on examination. Recommend comparison with   clinical exam and follow-up with HIDA scan. Non-visualized pancreas. Us Gallbladder Ruq    Result Date: 11/3/2020  EXAMINATION: RIGHT UPPER QUADRANT ULTRASOUND 11/3/2020 8:14 am COMPARISON: June 19, 2020 HISTORY: ORDERING SYSTEM PROVIDED HISTORY: RUQ PAIN TECHNOLOGIST PROVIDED HISTORY: Reason for exam:->RUQ PAIN Reason for Exam: ruq pain Acuity: Unknown Type of Exam: Unknown FINDINGS: The right kidney measures 11 cm x 5.1 cm.   There is no hydronephrosis. The echogenicity of the renal cortex is within normal limits. The gallbladder demonstrates shadowing gallstones. There is no pericholecystic fluid. There is a negative sonographic Dick's sign on examination. The common bile duct measures up to 5 mm. The main portal vein is patent. The visualized portions of the liver are unremarkable without any mass lesions or gross intrahepatic biliary ductal dilatation. The gallbladder wall is slightly prominent appearing on image 18 measuring up to 5 mm. Somewhat limited study due to technique and patient cooperation. Gallstones seen with slight prominence of the gallbladder wall. There was a negative sonographic Dick's sign on examination. Recommend comparison with clinical exam and follow-up with HIDA scan. Non-visualized pancreas.            PROCEDURES   Unless otherwise noted below, none     Procedures    CRITICAL CARE TIME   N/A    CONSULTS:  IP CONSULT TO GENERAL SURGERY      EMERGENCY DEPARTMENT COURSE and DIFFERENTIAL DIAGNOSIS/MDM:   Vitals:    Vitals:    11/03/20 0804 11/03/20 0910 11/03/20 1001 11/03/20 1108   BP:  134/84 122/87 112/71   Pulse: 89 86 89 84   Resp: 20 20 16 16   SpO2: 100% 99% 94% 96%   Weight:       Height:           Patient was given the following medications:  Medications   morphine injection 4 mg (4 mg Intravenous Given 11/3/20 0912)   ondansetron (ZOFRAN) injection 4 mg (4 mg Intravenous Not Given 11/3/20 0748)   piperacillin-tazobactam (ZOSYN) 3.375 g in dextrose 5 % 50 mL IVPB (mini-bag) (3.375 g Intravenous New Bag 11/3/20 1200)   0.9 % sodium chloride bolus (0 mLs Intravenous Stopped 11/3/20 0906)   promethazine (PHENERGAN) injection 25 mg (25 mg Intramuscular Given 11/3/20 0802)   ketorolac (TORADOL) injection 30 mg (30 mg Intravenous Given 11/3/20 0912)   metoclopramide (REGLAN) injection 10 mg (10 mg Intravenous Given 11/3/20 0912)   diphenhydrAMINE (BENADRYL) injection 25 mg (25 mg Intravenous Given 11/3/20 7754)   iopamidol (ISOVUE-370) 76 % injection 75 mL (75 mLs Intravenous Given 11/3/20 0946)           Briefly, this is a 56-year-old female who presents to the emergency department today for right upper quadrant abdominal pain, associated nausea vomiting. She has a history of gallstones, as well as gastroparesis. The patient states that she has had increasing nausea for 1 week, but starting last night had abdominal pain as well as some nausea and vomiting. On physical exam she does have tenderness and some voluntary guarding to her right upper abdomen. CBC shows leukocytosis of 11.7, no evidence of anemia. CMP is unremarkable. Lipase is normal.  Pregnancy is negative. Ultrasound shows gallstones with prominence of the gallbladder wall. She was given pain medication and nausea medication continues to have 10 out of 10 pain, CT obtained for gallstones and gallbladder sludge with irregularity and thickening of the gallbladder wall concerning for acute cholecystitis. Patient was given Zosyn, general surgery paged, they will admit the patient. She also has an incidental finding of a lesion to her left kidney, patient was made aware of this, and she states that she will have this followed up. Patient's pain is under control at this time, she is overall feeling better. She is stable for admission    FINAL IMPRESSION      1. Acute cholecystitis    2. Kidney mass          DISPOSITION/PLAN   DISPOSITION Admitted 11/03/2020 12:18:12 PM      PATIENT REFERREDTO:  No follow-up provider specified.     DISCHARGE MEDICATIONS:  New Prescriptions    No medications on file       DISCONTINUED MEDICATIONS:  Discontinued Medications    ACETAMINOPHEN 500 MG CAPS    Take 1,000 mg by mouth 4 times daily as needed for Pain    ATORVASTATIN (LIPITOR) 40 MG TABLET    Take 0.5 tablets by mouth daily    PANTOPRAZOLE (PROTONIX) 40 MG TABLET    Take 1 tablet by mouth every morning (before breakfast) PHENYLEPHRINE-APAP-GUAIFENESIN (MUCINEX SINUS-MAX CONGESTION) -400 MG/20ML LIQD    Take 20 mLs by mouth every 4 hours    SUCRALFATE (CARAFATE) 1 GM TABLET    Take 1 tablet by mouth 4 times daily              (Please note that portions of this note were completed with a voice recognition program.  Efforts were made to edit the dictations but occasionally words are mis-transcribed.)    Nir Chahal PA-C (electronically signed)            Nir Chahal PA-C  11/03/20 0793

## 2020-11-03 NOTE — PROGRESS NOTES
Patient transferred from Or to PACU, VSS and O2 sats maintained 7L via simple mask. Abdominal incisions x4 CDI with skin glue. Denies pain. Continue to monitor.

## 2020-11-03 NOTE — ANESTHESIA POSTPROCEDURE EVALUATION
Department of Anesthesiology  Postprocedure Note    Patient: Vangie Chavez  MRN: [id number]  YOB: 1975  Date of evaluation: 11/3/2020  Time:  4:39 PM     Procedure Summary     Date:  11/03/20 Room / Location:  [address]    Anesthesia Start:  2924 Anesthesia Stop:  0138    Procedure:  LAPAROSCOPIC CHOLECYSTECTOMY (N/A Abdomen) Diagnosis:       Acute cholecystitis      (Acute cholecystitis [K81.0])    Surgeon:  Alisha Walker MD Responsible Provider:  Katelyn Galeano MD    Anesthesia Type:  general ASA Status:  2 - Emergent          Anesthesia Type: general    Ksenia Phase I: Ksenia Score: 10    Ksenia Phase II:      Last vitals: Reviewed and per EMR flowsheets.        Anesthesia Post Evaluation

## 2020-11-03 NOTE — PROGRESS NOTES
Pt received from ER, via wc. Placed on cart and assessment completed. Teaching / education initiated regarding perioperative experience, expectations, and pain management during stay. Patient verbalized understanding.  Family at bedside, call bell within reach

## 2020-11-03 NOTE — ED NOTES
Patient states she had a slimfast shake at 0300 this morning and sips of water with her phenergan at 0600. Patient denies any other oral intake today.      Piotr Perry RN  11/03/20 3526

## 2020-11-03 NOTE — ANESTHESIA PRE PROCEDURE
 Intractable migraine without aura and with status migrainosus G43.011    Pure hypercholesterolemia E78.00    Acute cholecystitis K81.0    Acute calculous cholecystitis K80.00       Past Medical History:        Diagnosis Date    Gastroparesis     GERD (gastroesophageal reflux disease)     H/O myringotomy     w/tube placement     Sleep apnea syndrome     no c-pap    Tobacco use disorder        Past Surgical History:        Procedure Laterality Date    ADENOIDECTOMY      COLONOSCOPY N/A 7/9/2020    COLONOSCOPY POLYPECTOMY SNARE/COLD BIOPSY performed by Beau Thomas MD at [address] [place] [address]  7/9/2020    COLONOSCOPY WITH BIOPSY performed [facility] at [facility]  11/30/11    DILATION AND CURETTAGE OF UTERUS  5/24/12    HYSTERECTOMY, TOTAL ABDOMINAL  04/05/2018    total hysterectomy, BSO    TYMPANOSTOMY TUBE PLACEMENT      UPPER GASTROINTESTINAL ENDOSCOPY N/A 7/9/2020    EGD BIOPSY performed by Beau Thomas MD at [address] [place] [address] History:    Social History     Tobacco Use    Smoking status: Current Every Day Smoker     Packs/day: 0.50     Years: 14.00     Pack years: 7.00     Types: Cigarettes    Smokeless tobacco: Never Used   Substance Use Topics    Alcohol use: No     Comment: rare                                Ready to quit: Not Answered  Counseling given: Not Answered      Vital Signs (Current):   Vitals:    11/03/20 1241 11/03/20 1300 11/03/20 1319 11/03/20 1440   BP: 100/60 101/70 (!) 101/59 102/60   Pulse: 66  72 70   Resp: 16  18 16   Temp: 98.4 °F (36.9 °C)   98.4 °F (36.9 °C)   TempSrc: Oral   Temporal   SpO2: 95%  97% 98%   Weight:    170 lb (77.1 kg)   Height:    4' 11\" (1.499 m)                                              BP Readings from Last 3 Encounters:   11/03/20 102/60   07/14/20 110/70   07/09/20 (!) 192/146       NPO Status: BMI:   Wt Readings from Last 3 Encounters:   11/03/20 170 lb (77.1 kg)   07/14/20 187 lb (84.8 kg)   07/09/20 186 lb (84.4 kg)     Body mass index is 34.34 kg/m². CBC:   Lab Results   Component Value Date    WBC 11.7 11/03/2020    RBC 4.67 11/03/2020    HGB 14.2 11/03/2020    HCT 41.5 11/03/2020    MCV 89.0 11/03/2020    RDW 13.8 11/03/2020     11/03/2020       CMP:   Lab Results   Component Value Date     11/03/2020    K 3.5 11/03/2020     11/03/2020    CO2 27 11/03/2020    BUN 11 11/03/2020    CREATININE <0.5 11/03/2020    GFRAA >60 11/03/2020    GFRAA >60 11/30/2011    AGRATIO 1.7 11/03/2020    LABGLOM >60 11/03/2020    GLUCOSE 104 11/03/2020    PROT 8.1 11/03/2020    CALCIUM 10.4 11/03/2020    BILITOT <0.2 11/03/2020    ALKPHOS 105 11/03/2020    AST 17 11/03/2020    ALT 28 11/03/2020       POC Tests: No results for input(s): POCGLU, POCNA, POCK, POCCL, POCBUN, POCHEMO, POCHCT in the last 72 hours.     Coags:   Lab Results   Component Value Date    PROTIME 11.4 04/11/2018    INR 1.01 04/11/2018       HCG (If Applicable):   Lab Results   Component Value Date    PREGTESTUR Negative 04/05/2018        ABGs: No results found for: PHART, PO2ART, OIH7VJR, CMB9SHN, BEART, C2UOUNRN     Type & Screen (If Applicable):  Lab Results   Component Value Date    LABABO A 04/27/2012    [address] Positive 04/27/2012       Drug/Infectious Status (If Applicable):  No results found for: HIV, HEPCAB    COVID-19 Screening (If Applicable):   Lab Results   Component Value Date    COVID19 Not Detected 07/02/2020         Anesthesia Evaluation    Airway: Mallampati: II  TM distance: >3 FB   Neck ROM: full  Mouth opening: > = 3 FB Dental:          Pulmonary:   (+) sleep apnea:                             Cardiovascular:            Rhythm: regular  Rate: normal                    Neuro/Psych:   (+) headaches:,             GI/Hepatic/Renal:   (+) GERD:,           Endo/Other:                     Abdominal: Vascular:                                        Anesthesia Plan      general     ASA 2 - emergent       Induction: intravenous. Anesthetic plan and risks discussed with patient. Plan discussed with CRNA.                   Salas Contreras MD   11/3/2020

## 2020-11-03 NOTE — BRIEF OP NOTE
[address] and Laparoscopic Surgery  Brief Operative Note  Pt Name: Saqib Eaton  CSN: [id number]  YOB: 1975    Date of Procedure: 11/3/2020    Pre-operative Diagnosis: Acute cholecystitis    Post-operative Diagnosis: same     Procedure: Laparoscopic cholecystectomy    Surgeon(s):  Michi Ochoa MD     Surgical Assistant: Ari Moreno    Anesthesia:  General anesthesia    Findings: Full note dictated, Dictation Job Number: [id number]    Estimated Blood Loss: less than 624  ml    Complications: None    Specimens: gallbladder and bile for culture  ID Type Source Tests Collected by Time Destination   1 :  Body Fluid Fluid CULTURE, ANAEROBIC, CULTURE, FUNGUS, CULTURE, BODY FLUID Michi Ochoa MD 11/3/2020 1548    A :  Tissue Gallbladder SURGICAL PATHOLOGY Michi Ochoa MD 11/3/2020 1549       Implants:  * No implants in log *    Drains: none   * No LDAs found *    Condition: stable     Disposition and Post-operative plan: PACU, med/surg mendoza     Ronnie Johnson MD, FACS  11/3/2020  4:32 PM

## 2020-11-03 NOTE — ED NOTES
Pharmacy Medication History Note      List of current medications patient is taking is complete. Source of information: Patient    Changes made to medication list:  Medications flagged for removal (include reason, ex. noncompliance):  none    Medications removed (include reason, ex. therapy complete or physician discontinued):  Acetaminophen- therapy completed  Pantoprazole- therapy completed  Sucralfate- therapy completed  Atorvastatin- therapy completed   Mucinex- therapy completed     Medications added/doses adjusted:  Naproxen 220 mg 2 tablets BID PRN    Other notes (ex. Recent course of antibiotics, Coumadin dosing):  Denies use of other OTC or herbal medications. Last dose times updated. Lucius TavaresD  PGY1 Pharmacy Resident  [id number]    Home Medicine Reconciliation:    No current facility-administered medications on file prior to encounter.         Current Outpatient Medications on File Prior to Encounter   Medication Sig Dispense Refill    promethazine (PHENERGAN) 25 MG tablet Take 25 mg by mouth 4 times daily as needed for Nausea      meclizine (ANTIVERT) 25 MG tablet Take 1 tablet by mouth 3 times daily as needed for Dizziness 60 tablet 3    naproxen sodium (ALEVE) 220 MG tablet Take 2 tablets by mouth 2 times daily as needed       [DISCONTINUED] Acetaminophen 500 MG CAPS Take 1,000 mg by mouth 4 times daily as needed for Pain      [DISCONTINUED] pantoprazole (PROTONIX) 40 MG tablet Take 1 tablet by mouth every morning (before breakfast) 30 tablet 5    [DISCONTINUED] sucralfate (CARAFATE) 1 GM tablet Take 1 tablet by mouth 4 times daily 120 tablet 3    [DISCONTINUED] atorvastatin (LIPITOR) 40 MG tablet Take 0.5 tablets by mouth daily 30 tablet 3    [DISCONTINUED] phenylephrine-APAP-guaiFENesin (MUCINEX SINUS-MAX CONGESTION) -400 MG/20ML LIQD Take 20 mLs by mouth every 4 hours

## 2020-11-03 NOTE — PROGRESS NOTES
Patient awake, VSS and O2 sat maintained on 4L per NC. Denies pain, abdominal incisions x4 CDI. Patient meets all phase 1 discharge criteria, seen by anesthesia. Await bed availability.

## 2020-11-03 NOTE — H&P
[place] [address] and Laparoscopic [facility]        History and Physical    Patient Name: Aba Manzano  MRN: [id number]  YOB: 1975  Admission Date: 11/3/2020  7:23 AM   Date of Evaluation: 11/3/2020  Primary Care Physician: Cesar Mcgarry MD  Chief Complaint: Abdominal pain    SUBJECTIVE    History of Present Illness:  Ms. Naomie Ward is a 45 y.o. female who presents with a 1 week history of severe, constant, sharp/stabbing right upper quadrant abdominal pain that radiates around the abdomen, through the back, and upward into the right chest.  No inciting or alleviating factors noted; worse with any PO intake. She reports associated nausea and vomiting that actually began just prior to the onset of pain after the patient consumed what she thought was \"bad\" albarran. She also reports alternating constipation and diarrhea, somewhat at baseline, but worse diarrhea with \"fatty\" stools over the last week; she reports some blood but has known hemorrhoids. She denies fevers, chills, jaundice, urinary symptoms, chest pain, or SOB. All of her symptoms seemed to have worsened in the last 24 hours prompting her to come to the ED for further evaluation. She reports ongoing nausea that began earlier this year and underwent both EGD (normal) and colonoscopy (polypectomy, otherwise noraml) with Dr. Marcos Tracy in July 2020. She was also evaluated by Dr. Huma Carreno in July 2020 for similar symptoms but with significantly less pain at that time; primary complaints was nausea/vomiting. A gastric emptying study was done and revealed delayed gastric emptying. Other significant history includes GERD but she reports that she no longer takes her PPI. Prior abdominal surgery includes a laparoscopic total abdominal hysterectomy. No blood thinners. Current everyday smoker.       Past Medical History:      Diagnosis Date    Gastroparesis     GERD (gastroesophageal reflux disease)     H/O myringotomy     w/tube placement     Sleep apnea syndrome     no c-pap    Tobacco use disorder        Past Surgical History:      Procedure Laterality Date    ADENOIDECTOMY      COLONOSCOPY N/A 7/9/2020    COLONOSCOPY POLYPECTOMY SNARE/COLD BIOPSY performed by Estrella Saucedo MD at [address] [facility]  7/9/2020    COLONOSCOPY WITH BIOPSY performed by Estrella Saucedo MD at [facility]  11/30/11    DILATION AND CURETTAGE OF UTERUS  5/24/12    HYSTERECTOMY, TOTAL ABDOMINAL  04/05/2018    total hysterectomy, BSO    TYMPANOSTOMY TUBE PLACEMENT      UPPER GASTROINTESTINAL ENDOSCOPY N/A 7/9/2020    EGD BIOPSY performed by Estrella Saucedo MD at [address] [place] [address]       Scheduled Meds:   ondansetron  4 mg Intravenous Once    piperacillin-tazobactam  3.375 g Intravenous Once     Continuous Infusions:  PRN Meds:.morphine    Prior to Admission medications    Medication Sig Start Date End Date Taking? Authorizing Provider   promethazine (PHENERGAN) 25 MG tablet Take 25 mg by mouth 4 times daily as needed for Nausea   Yes Historical Provider, MD   Acetaminophen 500 MG CAPS Take 1,000 mg by mouth 4 times daily as needed for Pain    Historical Provider, MD   pantoprazole (PROTONIX) 40 MG tablet Take 1 tablet by mouth every morning (before breakfast) 6/1/20   Osmani Rivas MD   sucralfate (CARAFATE) 1 GM tablet Take 1 tablet by mouth 4 times daily 6/1/20   Osmani Rivas MD   atorvastatin (LIPITOR) 40 MG tablet Take 0.5 tablets by mouth daily 1/28/20   Osmani Rivas MD   meclizine (ANTIVERT) 25 MG tablet Take 1 tablet by mouth 3 times daily as needed for Dizziness 11/21/19   Osmani Rivas MD   phenylephrine-APAP-guaiFENesin [facility] SINUS-MAX CONGESTION) -334 MG/20ML LIQD Take 20 mLs by mouth every 4 hours    Historical Provider, MD   Naproxen Sodium (ALEVE PO) Take by mouth    Historical Provider, MD        Allergies:  Patient has no known allergies.     Social and abdominal pain  GENITOURINARY:  negative  HEMATOLOGIC/LYMPHATIC:  negative  NEUROLOGICAL:  negative  SKIN:  negative      OBJECTIVE    Vital Signs:  Patient Vitals for the past 24 hrs:   BP Pulse Resp SpO2 Height Weight   11/03/20 1108 112/71 84 16 96 % -- --   11/03/20 1001 122/87 89 16 94 % -- --   11/03/20 0910 134/84 86 20 99 % -- --   11/03/20 0804 -- 89 20 100 % -- --   11/03/20 0720 (!) 133/91 111 24 100 % 4' 11\" (1.499 m) 170 lb (77.1 kg)      TEMPERATURE HISTORY 24H: No data recorded.     BLOOD PRESSURE HISTORY: Systolic (13HZQ), RSQ:758 , Min:112 , MIKE:647    Diastolic (75BKV), ERE:73, Min:71, Max:91    Admission Weight: 170 lb (77.1 kg)       Intake/Output Summary (Last 24 hours) at 11/3/2020 1136  Last data filed at 11/3/2020 4181  Gross per 24 hour   Intake 1000 ml   Output --   Net 1000 ml     Drain/tube Output:         Physical Exam:  CONSTITUTIONAL:  alert, no apparent distress and moderately obese  NEUROLOGIC:  Mental Status Exam:  Level of Alertness:   awake  Orientation:   person, place, time  EYES:  sclera clear  ENT:  normocepalic, without obvious abnormality  NECK:  supple, symmetrical, trachea midline  LUNGS:  clear to auscultation  CARDIOVASCULAR:  regular rate and rhythm and no murmur noted  ABDOMEN: soft, non-distended, tenderness noted in the epigastric region and in the right upper quadrant, voluntary guarding present, no masses palpated, normal bowel sounds,  scars noted--trochar sites  Extremities: no edema  SKIN:  no bruising or bleeding and normal skin color, texture, turgor    Labs:  CBC:    Recent Labs     11/03/20  0732   WBC 11.7*   HGB 14.2   HCT 41.5        BMP:    Recent Labs     11/03/20  0732      K 3.5      CO2 27   BUN 11   CREATININE <0.5*   GLUCOSE 104*     Hepatic:    Recent Labs     11/03/20  0732   AST 17   ALT 28   BILITOT <0.2   ALKPHOS 105     Amylase:  No results found for: AMYLASE  Lipase:    Lab Results   Component Value Date    LIPASE 28.0 11/03/2020    LIPASE 28.0 06/19/2020      Mag:  No results found for: MG  Phos:   No results found for: PHOS   Coags:   Lab Results   Component Value Date    PROTIME 11.4 04/11/2018    INR 1.01 04/11/2018       Cultures:  Anaerobic culture  No results found for: LABANAE  Fungus stain  No results found for requested labs within last 30 days. Gram stain  No results found for requested labs within last 30 days. Organism  No results found for: [facility]  Surgical culture  No results found for: CXSURG  Blood culture 1  No results found for requested labs within last 30 days. Blood culture 2  No results found for requested labs within last 30 days. Fecal occult  No results found for requested labs within last 30 days. GI bacterial pathogens by PCR  No results found for requested labs within last 30 days. C. difficile  No results found for requested labs within last 30 days. Urine culture  Lab Results   Component Value Date    LABURIN  04/11/2018     <50,000 CFU/ml mixed skin/urogenital curtis. No further workup       Imaging:  I have personally reviewed the following films:    Ct Abdomen Pelvis W Iv Contrast Additional Contrast? None    Result Date: 11/3/2020  EXAMINATION: CT OF THE ABDOMEN AND PELVIS WITH CONTRAST 11/3/2020 9:46 am TECHNIQUE: CT of the abdomen and pelvis was performed with the administration of intravenous contrast. Multiplanar reformatted images are provided for review. Dose modulation, iterative reconstruction, and/or weight based adjustment of the mA/kV was utilized to reduce the radiation dose to as low as reasonably achievable. COMPARISON: Gallbladder ultrasound from 11/03/2020 HISTORY: ORDERING SYSTEM PROVIDED HISTORY: [facility] pain TECHNOLOGIST PROVIDED HISTORY: If patient is on cardiac monitor and/or pulse ox, they may be taken off cardiac monitor and pulse ox, left on O2 if currently on. All monitors reattached when patient returns to room.  Additional Contrast?->None Reason for exam:->ruq pain Reason for Exam: ruq pain Acuity: Unknown Type of Exam: Unknown 82-year-old female with right upper quadrant abdominal pain FINDINGS: Lower Chest: Mild bibasilar atelectasis and respiratory motion. No free intra-abdominal air. Small hiatal hernia. Organs: Gallstones and sludge distending the gallbladder with trace pericholecystic fluid and gallbladder wall thickening/irregularity. Fatty liver. Mild intrahepatic biliary ductal dilation. Scattered soft tissue calcifications. Adrenal glands, pancreas, and right kidney grossly unremarkable in appearance. Irregular heterogeneous lesion at the inferior pole of the left kidney measuring 2 cm on image 74, series 2. No obstructing calculus, hydronephrosis, or hydroureter. GI/Bowel: Normal appendix. No significant dilation of small bowel loops to suggest small bowel obstruction. Mild-to-moderate stool burden. Pelvis: Urinary bladder is collapsed. Pelvic phleboliths. No free fluid in the pelvis. No inguinal or pelvic sidewall lymphadenopathy. Peritoneum/Retroperitoneum: Abdominal aorta normal in appearance and caliber. No retroperitoneal lymphadenopathy. Psoas muscles normal in size and symmetric in appearance. Bones/Soft Tissues: Mild degenerative changes within the spine. Small midline fat-containing periumbilical hernia. 1. Gallstones and gallbladder sludge with irregularity and thickening of the gallbladder wall concerning for acute cholecystitis. Further evaluation with nuclear medicine HIDA scan recommended. 2. Suspected 2 cm solid mass lesion at the inferior pole of the left kidney concerning for solid renal mass/renal cell carcinoma. Initial further evaluation with renal ultrasound is recommended. Eventual further evaluation with CT or MR renal mass protocol will likely be necessary. 3. Mild intrahepatic biliary ductal dilation. 4. Old granulomatous disease. 5. Small midline fat-containing periumbilical hernia. 6. Normal appendix.  7. Small hiatal hernia. 8. Fatty liver. Us Gallbladder Ruq    Result Date: 11/3/2020  EXAMINATION: RIGHT UPPER QUADRANT ULTRASOUND 11/3/2020 8:14 am COMPARISON: June 19, 2020 HISTORY: ORDERING SYSTEM PROVIDED HISTORY: RUQ PAIN TECHNOLOGIST PROVIDED HISTORY: Reason for exam:->RUQ PAIN Reason for Exam: ruq pain Acuity: Unknown Type of Exam: Unknown FINDINGS: The right kidney measures 11 cm x 5.1 cm. There is no hydronephrosis. The echogenicity of the renal cortex is within normal limits. The gallbladder demonstrates shadowing gallstones. There is no pericholecystic fluid. There is a negative sonographic Dick's sign on examination. The common bile duct measures up to 5 mm. The main portal vein is patent. The visualized portions of the liver are unremarkable without any mass lesions or gross intrahepatic biliary ductal dilatation. The gallbladder wall is slightly prominent appearing on image 18 measuring up to 5 mm. Somewhat limited study due to technique and patient cooperation. Gallstones seen with slight prominence of the gallbladder wall. There was a negative sonographic Dick's sign on examination. Recommend comparison with clinical exam and follow-up with HIDA scan. Non-visualized pancreas. Scheduled Meds:   ondansetron  4 mg Intravenous Once    piperacillin-tazobactam  3.375 g Intravenous Once     Continuous Infusions:  PRN Meds:.morphine      ASSESSMENT:  45 y.o. female admitted with   1. Acute cholecystitis    2. Kidney mass        Acute calculous cholecystitis  Periumbilical hernia, fat-containing  Left kidney mass      PLAN:  1. Laparoscopic cholecystectomy with cholangiogram today with Dr. Teresa Johnson  2. NPO  3. IV hydration; monitor and correct electrolytes  4. Antibiotics  5. Activity as tolerated  6. Pulmonary toilet, incentive spirometry  7. PRN analgesics and antiemetics--minimizing narcotics as tolerated  8. DVT prophylaxis with Lovenox and SCD's  9.  Management of medical comorbid etiologies; further workup for left kidney mass as an outpatient    EDUCATION:  Educated patient on plan of care and disease process--all questions answered. The patient is currently smoking. The risks related to smoking were reviewed with the patient. Recommend maintaining a smoke-free lifestyle. Products available for smoking cessation were discussed in detail. Plans discussed with patient and nursing. Reviewed and discussed with Dr. Princess Geronimo. Signed:  ANTOINETTE Santoro - CNP  11/3/2020 11:36 AM    I have personally performed a face to face diagnostic evaluation on this patient. I have interviewed and examined the patient and I agree with the assessment above. In summary, my findings and plan are the following:   Ms. Aftab Garibay is a 45 y.o. female who presents with   Acute cholecystitis  Left renal mass    Plan:  1. The patient has a history, exam, and imaging consistent with cholecystitis. We discussed the risks, benefits and alternatives of a laparoscopic cholecystectomy with cholangiogram. Specific risks discussed include bleeding, infection, injury to surrounding structures, possible requirement of additional procedures, and conversion to open, as well as the perioperative risks associated with general anesthesia. Benefits include resolution of symptoms and prevention of future complications related to cholelithiasis. Alternatives include observation with medical management. Details of the procedure were discussed and all questions answered. The patient understands, agrees, and wishes to proceed. She will be taken to the OR this afternoon  2. NPO  3. IVF resuscitation  4. Antibiotics  5. Pain medication and antiemetics as needed  6. Defer workup of left renal mass to outpatient per primary care physician    José Vidal.  Princess Geronimo MD, FACS  11/3/2020  12:42 PM

## 2020-11-03 NOTE — ED PROVIDER NOTES
I personally evaluated and examined the patient in conjunction with the ADAMA and agree with the assessment, treatment plan and disposition of the patient as recorded by the ADAMA. I reviewed pertinent nursing notes, triage notes, vital signs, past medical history, family and social history, medications, and allergies. Complete review of systems was conducted by the ADAMA and/or myself. Review of systems is negative except as documented in the history of present illness. Brief HPI: 44-year-old female presents emergency department with chief complaint of right upper quad abdominal pain, nausea. Pain is described as sharp, constant, 9/10 without radiation. No alleviating or aggravating factors. History of gallstones. Physical Exam: Patient is uncomfortable appearing. She has right upper quadrant epigastric tenderness to palpation with guarding. Ultrasound was ordered which shows cholelithiasis and gallbladder wall thickening however the exam was limited secondary to patient motion and cooperation. Therefore CT abdomen pelvis was added on. Pain medications are given. CT abdomen pelvis shows acute cholecystitis as well as possible kidney mass was of the patient is notified about. General surgery consulted for admission to the hospital and antibiotics are initiated. FINAL IMPRESSION     1. Acute cholecystitis    2. Kidney mass            Electronically signed by:   Jeannie Greenfield DO  11/03/20 4227

## 2020-11-04 VITALS
OXYGEN SATURATION: 96 % | HEIGHT: 59 IN | DIASTOLIC BLOOD PRESSURE: 70 MMHG | SYSTOLIC BLOOD PRESSURE: 102 MMHG | BODY MASS INDEX: 34.27 KG/M2 | HEART RATE: 69 BPM | RESPIRATION RATE: 16 BRPM | WEIGHT: 170 LBS | TEMPERATURE: 98.1 F

## 2020-11-04 LAB
A/G RATIO: 1.7 (ref 1.1–2.2)
ALBUMIN SERPL-MCNC: 3.9 G/DL (ref 3.4–5)
ALP BLD-CCNC: 121 U/L (ref 40–129)
ALT SERPL-CCNC: 202 U/L (ref 10–40)
ANION GAP SERPL CALCULATED.3IONS-SCNC: 10 MMOL/L (ref 3–16)
AST SERPL-CCNC: 113 U/L (ref 15–37)
BASOPHILS ABSOLUTE: 0 K/UL (ref 0–0.2)
BASOPHILS RELATIVE PERCENT: 0.2 %
BILIRUB SERPL-MCNC: 0.4 MG/DL (ref 0–1)
BILIRUBIN DIRECT: <0.2 MG/DL (ref 0–0.3)
BILIRUBIN, INDIRECT: NORMAL MG/DL (ref 0–1)
BUN BLDV-MCNC: 6 MG/DL (ref 7–20)
CALCIUM SERPL-MCNC: 9.5 MG/DL (ref 8.3–10.6)
CHLORIDE BLD-SCNC: 104 MMOL/L (ref 99–110)
CO2: 25 MMOL/L (ref 21–32)
CREAT SERPL-MCNC: 0.5 MG/DL (ref 0.6–1.1)
EOSINOPHILS ABSOLUTE: 0 K/UL (ref 0–0.6)
EOSINOPHILS RELATIVE PERCENT: 0 %
GFR AFRICAN AMERICAN: >60
GFR NON-AFRICAN AMERICAN: >60
GLOBULIN: 2.3 G/DL
GLUCOSE BLD-MCNC: 137 MG/DL (ref 70–99)
HCT VFR BLD CALC: 34.5 % (ref 36–48)
HEMOGLOBIN: 11.7 G/DL (ref 12–16)
LIPASE: 15 U/L (ref 13–60)
LYMPHOCYTES ABSOLUTE: 1.1 K/UL (ref 1–5.1)
LYMPHOCYTES RELATIVE PERCENT: 11.3 %
MCH RBC QN AUTO: 30.7 PG (ref 26–34)
MCHC RBC AUTO-ENTMCNC: 33.8 G/DL (ref 31–36)
MCV RBC AUTO: 90.9 FL (ref 80–100)
MONOCYTES ABSOLUTE: 0.7 K/UL (ref 0–1.3)
MONOCYTES RELATIVE PERCENT: 6.9 %
NEUTROPHILS ABSOLUTE: 8.1 K/UL (ref 1.7–7.7)
NEUTROPHILS RELATIVE PERCENT: 81.6 %
PDW BLD-RTO: 14.1 % (ref 12.4–15.4)
PLATELET # BLD: 251 K/UL (ref 135–450)
PMV BLD AUTO: 7.7 FL (ref 5–10.5)
POTASSIUM REFLEX MAGNESIUM: 3.8 MMOL/L (ref 3.5–5.1)
RBC # BLD: 3.79 M/UL (ref 4–5.2)
SODIUM BLD-SCNC: 139 MMOL/L (ref 136–145)
TOTAL PROTEIN: 6.2 G/DL (ref 6.4–8.2)
WBC # BLD: 9.9 K/UL (ref 4–11)

## 2020-11-04 PROCEDURE — 94150 VITAL CAPACITY TEST: CPT

## 2020-11-04 PROCEDURE — 2580000003 HC RX 258: Performed by: NURSE PRACTITIONER

## 2020-11-04 PROCEDURE — 83690 ASSAY OF LIPASE: CPT

## 2020-11-04 PROCEDURE — 2580000003 HC RX 258: Performed by: SURGERY

## 2020-11-04 PROCEDURE — 80053 COMPREHEN METABOLIC PANEL: CPT

## 2020-11-04 PROCEDURE — 6370000000 HC RX 637 (ALT 250 FOR IP): Performed by: NURSE PRACTITIONER

## 2020-11-04 PROCEDURE — 99024 POSTOP FOLLOW-UP VISIT: CPT | Performed by: SURGERY

## 2020-11-04 PROCEDURE — 82248 BILIRUBIN DIRECT: CPT

## 2020-11-04 PROCEDURE — 36415 COLL VENOUS BLD VENIPUNCTURE: CPT

## 2020-11-04 PROCEDURE — C9113 INJ PANTOPRAZOLE SODIUM, VIA: HCPCS | Performed by: NURSE PRACTITIONER

## 2020-11-04 PROCEDURE — 6360000002 HC RX W HCPCS: Performed by: SURGERY

## 2020-11-04 PROCEDURE — 85025 COMPLETE CBC W/AUTO DIFF WBC: CPT

## 2020-11-04 PROCEDURE — APPNB30 APP NON BILLABLE TIME 0-30 MINS: Performed by: NURSE PRACTITIONER

## 2020-11-04 PROCEDURE — 6370000000 HC RX 637 (ALT 250 FOR IP): Performed by: SURGERY

## 2020-11-04 PROCEDURE — 94760 N-INVAS EAR/PLS OXIMETRY 1: CPT

## 2020-11-04 PROCEDURE — APPSS15 APP SPLIT SHARED TIME 0-15 MINUTES: Performed by: NURSE PRACTITIONER

## 2020-11-04 PROCEDURE — 6360000002 HC RX W HCPCS: Performed by: NURSE PRACTITIONER

## 2020-11-04 PROCEDURE — G0378 HOSPITAL OBSERVATION PER HR: HCPCS

## 2020-11-04 RX ORDER — DOCUSATE SODIUM 100 MG/1
100 CAPSULE, LIQUID FILLED ORAL 2 TIMES DAILY
Status: DISCONTINUED | OUTPATIENT
Start: 2020-11-04 | End: 2020-11-04 | Stop reason: HOSPADM

## 2020-11-04 RX ORDER — MORPHINE SULFATE 2 MG/ML
2 INJECTION, SOLUTION INTRAMUSCULAR; INTRAVENOUS EVERY 4 HOURS PRN
Status: DISCONTINUED | OUTPATIENT
Start: 2020-11-04 | End: 2020-11-04 | Stop reason: HOSPADM

## 2020-11-04 RX ORDER — POLYETHYLENE GLYCOL 3350 17 G/17G
17 POWDER, FOR SOLUTION ORAL DAILY
Status: DISCONTINUED | OUTPATIENT
Start: 2020-11-04 | End: 2020-11-04 | Stop reason: HOSPADM

## 2020-11-04 RX ORDER — OXYCODONE HYDROCHLORIDE 5 MG/1
5 TABLET ORAL EVERY 6 HOURS PRN
Qty: 12 TABLET | Refills: 0 | Status: SHIPPED | OUTPATIENT
Start: 2020-11-04 | End: 2020-11-09

## 2020-11-04 RX ORDER — KETOROLAC TROMETHAMINE 30 MG/ML
30 INJECTION, SOLUTION INTRAMUSCULAR; INTRAVENOUS EVERY 6 HOURS
Status: DISCONTINUED | OUTPATIENT
Start: 2020-11-04 | End: 2020-11-04 | Stop reason: HOSPADM

## 2020-11-04 RX ADMIN — KETOROLAC TROMETHAMINE 30 MG: 30 INJECTION, SOLUTION INTRAMUSCULAR at 14:17

## 2020-11-04 RX ADMIN — DOCUSATE SODIUM 100 MG: 100 CAPSULE ORAL at 20:00

## 2020-11-04 RX ADMIN — PIPERACILLIN AND TAZOBACTAM 3.38 G: 3; .375 INJECTION, POWDER, LYOPHILIZED, FOR SOLUTION INTRAVENOUS at 14:17

## 2020-11-04 RX ADMIN — PIPERACILLIN AND TAZOBACTAM 3.38 G: 3; .375 INJECTION, POWDER, LYOPHILIZED, FOR SOLUTION INTRAVENOUS at 03:33

## 2020-11-04 RX ADMIN — KETOROLAC TROMETHAMINE 30 MG: 30 INJECTION, SOLUTION INTRAMUSCULAR at 20:00

## 2020-11-04 RX ADMIN — Medication 10 ML: at 08:38

## 2020-11-04 RX ADMIN — DOCUSATE SODIUM 100 MG: 100 CAPSULE ORAL at 14:17

## 2020-11-04 RX ADMIN — OXYCODONE 10 MG: 5 TABLET ORAL at 14:21

## 2020-11-04 RX ADMIN — ACETAMINOPHEN 650 MG: 325 TABLET ORAL at 19:59

## 2020-11-04 RX ADMIN — ACETAMINOPHEN 650 MG: 325 TABLET ORAL at 05:52

## 2020-11-04 RX ADMIN — OXYCODONE 5 MG: 5 TABLET ORAL at 05:52

## 2020-11-04 RX ADMIN — OXYCODONE 10 MG: 5 TABLET ORAL at 10:21

## 2020-11-04 RX ADMIN — MORPHINE SULFATE 2 MG: 2 INJECTION, SOLUTION INTRAMUSCULAR; INTRAVENOUS at 00:09

## 2020-11-04 RX ADMIN — PANTOPRAZOLE SODIUM 40 MG: 40 INJECTION, POWDER, FOR SOLUTION INTRAVENOUS at 08:37

## 2020-11-04 RX ADMIN — ENOXAPARIN SODIUM 40 MG: 40 INJECTION SUBCUTANEOUS at 08:38

## 2020-11-04 RX ADMIN — POLYETHYLENE GLYCOL 3350 17 G: 17 POWDER, FOR SOLUTION ORAL at 14:18

## 2020-11-04 RX ADMIN — ACETAMINOPHEN 650 MG: 325 TABLET ORAL at 14:17

## 2020-11-04 RX ADMIN — ACETAMINOPHEN 650 MG: 325 TABLET ORAL at 00:19

## 2020-11-04 RX ADMIN — SODIUM CHLORIDE: 9 INJECTION, SOLUTION INTRAVENOUS at 03:33

## 2020-11-04 ASSESSMENT — PAIN DESCRIPTION - LOCATION
LOCATION: ABDOMEN

## 2020-11-04 ASSESSMENT — PAIN SCALES - GENERAL
PAINLEVEL_OUTOF10: 5
PAINLEVEL_OUTOF10: 9
PAINLEVEL_OUTOF10: 7
PAINLEVEL_OUTOF10: 3
PAINLEVEL_OUTOF10: 7
PAINLEVEL_OUTOF10: 9
PAINLEVEL_OUTOF10: 3
PAINLEVEL_OUTOF10: 6
PAINLEVEL_OUTOF10: 8
PAINLEVEL_OUTOF10: 5

## 2020-11-04 ASSESSMENT — PAIN DESCRIPTION - FREQUENCY
FREQUENCY: CONTINUOUS
FREQUENCY: INTERMITTENT
FREQUENCY: CONTINUOUS

## 2020-11-04 ASSESSMENT — PAIN DESCRIPTION - PAIN TYPE
TYPE: SURGICAL PAIN;REFERRED PAIN
TYPE: SURGICAL PAIN

## 2020-11-04 ASSESSMENT — PAIN DESCRIPTION - PROGRESSION
CLINICAL_PROGRESSION: GRADUALLY IMPROVING
CLINICAL_PROGRESSION: NOT CHANGED
CLINICAL_PROGRESSION: GRADUALLY WORSENING
CLINICAL_PROGRESSION: GRADUALLY WORSENING
CLINICAL_PROGRESSION: GRADUALLY IMPROVING

## 2020-11-04 ASSESSMENT — PAIN DESCRIPTION - ORIENTATION
ORIENTATION: MID

## 2020-11-04 ASSESSMENT — PAIN DESCRIPTION - ONSET
ONSET: ON-GOING

## 2020-11-04 ASSESSMENT — PAIN DESCRIPTION - DESCRIPTORS
DESCRIPTORS: SORE
DESCRIPTORS: SORE

## 2020-11-04 NOTE — PROGRESS NOTES
Sitting up in bed resting and watching TV, pt is alert and oriented and denies needs at this time. Assessment done, VSS, call light in reach, will continue to monitor.

## 2020-11-04 NOTE — PROGRESS NOTES
[facility]        Progress Note    Patient Name: Demetria Leary  MRN: [id number]  YOB: 1975  Date of Evaluation: 2020    Subjective:  No acute events overnight  Pain moderately controlled  Denies nausea or vomiting, tolerating clear liquid diet and wanting solids for lunch  No flatus or BM, feels constipated  Ambulates around room without difficulty      Post-Operative Day #1    Vital Signs:  Patient Vitals for the past 24 hrs:   BP Temp Temp src Pulse Resp SpO2 Height Weight   20 0835 (!) 83/58 98.1 °F (36.7 °C) Oral 64 16 95 % -- --   20 0815 -- -- -- -- 16 94 % -- --   20 0531 110/67 -- -- 75 -- -- -- --   20 0528 (!) 74/46 98.3 °F (36.8 °C) Oral 67 16 95 % -- --   20 0009 110/74 98.6 °F (37 °C) Oral 75 20 94 % -- --   20 -- -- -- -- -- 97 % -- --   20 1930 92/63 98 °F (36.7 °C) Oral 92 16 95 % -- --   20 1901 90/61 97.8 °F (36.6 °C) Oral 84 16 97 % -- --   20 1834 87/61 97.9 °F (36.6 °C) Oral 70 16 -- -- --   20 1815 -- -- -- 78 14 94 % -- --   20 1801 92/66 -- -- 64 15 96 % -- --   20 1745 (!) 92/53 -- -- 69 12 97 % -- --   20 1730 (!) 105/39 -- -- 69 12 97 % -- --   20 1715 (!) 99/52 -- -- 72 11 100 % -- --   20 1700 (!) 98/54 -- -- 64 14 100 % -- --   20 1650 (!) 99/52 98.8 °F (37.1 °C) Temporal 64 15 100 % -- --   20 1645 (!) 96/57 -- -- 63 16 97 % -- --   20 1640 (!) 102/59 -- -- 69 18 97 % -- --   20 1634 (!) 105/46 97.3 °F (36.3 °C) Temporal 64 19 100 % -- --   20 1440 102/60 98.4 °F (36.9 °C) Temporal 70 16 98 % 4' 11\" (1.499 m) 170 lb (77.1 kg)   20 1319 (!) 101/59 -- -- 72 18 97 % -- --   20 1300 101/70 -- -- -- -- -- -- --   20 1241 100/60 98.4 °F (36.9 °C) Oral 66 16 95 % -- --      TEMPERATURE HISTORY 24H: Temp (24hrs), Av.1 °F (36.2 °C), Min:89.1 °F (31.7 °C), Max:98.8 °F (37.1 °C)    BLOOD PRESSURE HISTORY: Systolic (46JXD), HGF:399 , Min:74 , NYB:719    Diastolic (53ZZP), LZV:40, Min:39, Max:92      Intake/Output:  I/O last 3 completed shifts: In: 5201 [P.O.:480; I.V.:2266; IV Piggyback:1050]  Out: 10 [Blood:10]  No intake/output data recorded. Drain/tube Output:       Physical Exam:  General: awake, alert, oriented to  person, place, time  Lungs: clear to auscultation  Abdomen: soft, non-distended, incisional tenderness only, active bowel sounds present   Skin/Wound: healing well, no drainage, no erythema, well approximated    Labs:  CBC:    Recent Labs     11/03/20 0732 11/04/20  0713   WBC 11.7* 9.9   HGB 14.2 11.7*   HCT 41.5 34.5*    251     BMP:    Recent Labs     11/03/20 0732 11/04/20  0713    139   K 3.5 3.8    104   CO2 27 25   BUN 11 6*   CREATININE <0.5* 0.5*   GLUCOSE 104* 137*     Hepatic:    Recent Labs     11/03/20  0732 11/04/20  0713   AST 17 113*   ALT 28 202*   BILITOT <0.2 0.4   ALKPHOS 105 121     Amylase:  No results found for: AMYLASE  Lipase:    Lab Results   Component Value Date    LIPASE 15.0 11/04/2020    LIPASE 28.0 11/03/2020    LIPASE 28.0 06/19/2020      Mag:  No results found for: MG  Phos:   No results found for: PHOS   Coags:   Lab Results   Component Value Date    PROTIME 11.4 04/11/2018    INR 1.01 04/11/2018       Cultures:  Anaerobic culture  No results found for: LABANAE  Fungus stain  No results found for requested labs within last 30 days. Gram stain  Results in Past 30 Days  Result Component Current Result Ref Range Previous Result Ref Range   Gram Stain Result No WBCs or organisms seen (11/3/2020)  Not in Time Range      Organism  No results found for: [facility]  Surgical culture  Lab Results   Component Value Date/Time    CXSURG No growth to date 11/03/2020 03:48 PM     Blood culture 1  No results found for requested labs within last 30 days. Blood culture 2  No results found for requested labs within last 30 days.      Fecal occult  No results found for requested labs within last 30 days. GI bacterial pathogens by PCR  No results found for requested labs within last 30 days. C. difficile  No results found for requested labs within last 30 days. Urine culture  Lab Results   Component Value Date    LABURIN  04/11/2018     <50,000 CFU/ml mixed skin/urogenital curtis. No further workup       Pathology:  Pathology results pending     Imaging:  I have personally reviewed the following films:    Ct Abdomen Pelvis W Iv Contrast Additional Contrast? None    Result Date: 11/3/2020  EXAMINATION: CT OF THE ABDOMEN AND PELVIS WITH CONTRAST 11/3/2020 9:46 am TECHNIQUE: CT of the abdomen and pelvis was performed with the administration of intravenous contrast. Multiplanar reformatted images are provided for review. Dose modulation, iterative reconstruction, and/or weight based adjustment of the mA/kV was utilized to reduce the radiation dose to as low as reasonably achievable. COMPARISON: Gallbladder ultrasound from 11/03/2020 HISTORY: ORDERING SYSTEM PROVIDED HISTORY: ruq pain TECHNOLOGIST PROVIDED HISTORY: If patient is on cardiac monitor and/or pulse ox, they may be taken off cardiac monitor and pulse ox, left on O2 if currently on. All monitors reattached when patient returns to room. Additional Contrast?->None Reason for exam:->ruq pain Reason for Exam: ruq pain Acuity: Unknown Type of Exam: Unknown 79-year-old female with right upper quadrant abdominal pain FINDINGS: Lower Chest: Mild bibasilar atelectasis and respiratory motion. No free intra-abdominal air. Small hiatal hernia. Organs: Gallstones and sludge distending the gallbladder with trace pericholecystic fluid and gallbladder wall thickening/irregularity. Fatty liver. Mild intrahepatic biliary ductal dilation. Scattered soft tissue calcifications. Adrenal glands, pancreas, and right kidney grossly unremarkable in appearance.  Irregular heterogeneous lesion at the inferior pole of the left kidney measuring 2 cm on image 74, series 2. No obstructing calculus, hydronephrosis, or hydroureter. GI/Bowel: Normal appendix. No significant dilation of small bowel loops to suggest small bowel obstruction. Mild-to-moderate stool burden. Pelvis: Urinary bladder is collapsed. Pelvic phleboliths. No free fluid in the pelvis. No inguinal or pelvic sidewall lymphadenopathy. Peritoneum/Retroperitoneum: Abdominal aorta normal in appearance and caliber. No retroperitoneal lymphadenopathy. Psoas muscles normal in size and symmetric in appearance. Bones/Soft Tissues: Mild degenerative changes within the spine. Small midline fat-containing periumbilical hernia. 1. Gallstones and gallbladder sludge with irregularity and thickening of the gallbladder wall concerning for acute cholecystitis. Further evaluation with nuclear medicine HIDA scan recommended. 2. Suspected 2 cm solid mass lesion at the inferior pole of the left kidney concerning for solid renal mass/renal cell carcinoma. Initial further evaluation with renal ultrasound is recommended. Eventual further evaluation with CT or MR renal mass protocol will likely be necessary. 3. Mild intrahepatic biliary ductal dilation. 4. Old granulomatous disease. 5. Small midline fat-containing periumbilical hernia. 6. Normal appendix. 7. Small hiatal hernia. 8. Fatty liver. Us Gallbladder Ruq    Result Date: 11/3/2020  EXAMINATION: RIGHT UPPER QUADRANT ULTRASOUND 11/3/2020 8:14 am COMPARISON: June 19, 2020 HISTORY: ORDERING SYSTEM PROVIDED HISTORY: RUQ PAIN TECHNOLOGIST PROVIDED HISTORY: Reason for exam:->RUQ PAIN Reason for Exam: ruq pain Acuity: Unknown Type of Exam: Unknown FINDINGS: The right kidney measures 11 cm x 5.1 cm. There is no hydronephrosis. The echogenicity of the renal cortex is within normal limits. The gallbladder demonstrates shadowing gallstones. There is no pericholecystic fluid.   There is a negative sonographic Dick's with Lovenox and SCD's  8. Management of medical comorbid etiologies; further workup for left kidney mass as an outpatient  9. Disposition: Home later today if tolerating diet and pain controlled on oral analgesics    EDUCATION:  Educated patient on plan of care and disease process--all questions answered. Plans discussed with patient and nursing. Reviewed and discussed with Dr. Aniyah Solomon. Signed:  ANTOINETTE Morocho - CNP  11/4/2020 11:54 AM    I have personally performed a face to face diagnostic evaluation on this patient. I have interviewed and examined the patient and I agree with the assessment above. In summary, my findings and plan are the following:   Ms. Juni Chaves is a 45 y.o. female who presents with   OR Date 11/3/20, laparoscopic cholecystectomy for acute cholecystitis  Left renal mass    Plan:  1. Tolerating clear liquids without nausea, advance diet  2. Monitor bowel function, patient feels constipated, will start laxatives  3. Pain control, will add Toradol  4. Continue antibiotics while in hospital, may stop at discharge  5. If improved this afternoon, possible discharge. Otherwise continue admission to tomorrow and reassess    Eileen Solomon MD, FACS  11/4/2020  12:33 PM

## 2020-11-04 NOTE — PROGRESS NOTES
Awake, resting quietly in bed, watching TV. C/O level 6/10 pain to incision site mid abdomen. Gave Morphine per prn order. No other complaint. VSS.   Assessment completed, see flow charts.luis

## 2020-11-04 NOTE — PROGRESS NOTES
Incentive Spirometry education and demonstration completed by Respiratory Therapy Yes      Response to education: Very Good     Teaching Time: 5 minutes    Minimum Predicted Vital Capacity - 470 mL. Patient's Actual Vital Capacity - 2000 mL.  Turning over to Nursing for routine follow-up Yes    Electronically signed by Tae Hernandez RCP on 11/4/2020 at 8:18 AM

## 2020-11-04 NOTE — OP NOTE
[address]                     [address] [facility], [address] [facility]                                OPERATIVE REPORT    PATIENT NAME: Celina Thapa                    :        1975  MED REC NO:   [id number]                          ROOM:       9829  ACCOUNT NO:   [de-identified]                           ADMIT DATE: 2020  PROVIDER:     Juliette Alfaro MD    DATE OF PROCEDURE:  2020    PREOPERATIVE DIAGNOSIS:  Acute cholecystitis. POSTOPERATIVE DIAGNOSIS:  Acute cholecystitis. OPERATION PERFORMED:  Laparoscopic cholecystectomy with cholangiogram.    SURGEON:  Juliette Alfaro MD    ANESTHESIA:  General.    INDICATIONS:  This is a 28-year-old female, who presents to [facility] with acute cholecystitis. The risks, benefits and alternative  treatments of laparoscopic cholecystectomy with cholangiogram were  discussed. Details of the procedures were discussed. All questions  were answered. The patient understood, agreed and wished to proceed. OPERATIVE PROCEDURE:  The patient was brought to the operative suite and  laid in the supine position. General anesthesia was induced and well  tolerated. The patient's abdomen was prepped and draped in the usual  sterile fashion. After a proper time-out was performed verifying the  operative site, procedure and patient, a supraumbilical incision was  made with scalpel. Using an open Bunny technique, fascia was elevated  with two interrupted 0-Vicryl sutures. The fascia was then divided with  electrocautery and peritoneum was entered with blunt dissection. The  Bunny was then placed into the abdominal cavity under direct vision. The peritoneal cavity was then insufflated with carbon dioxide to a  pressure of 15 mmHg, which was well tolerated.   Additional trocars were  then placed in the following locations, a 5 mm in the epigastric and two  5-mm in the right subcostal.  The gallbladder itself was noticed to be  tense and inflamed and I was unable to obtain good traction. As such,  the gallbladder had to be aspirated. An ovarian needle was placed into  the gallbladder and only a small amount of thick bile was able to be  aspirated and a portion of this was sent for culture. The dome of the  gallbladder with some difficulty was retracted cephalad due to the  inflammation. The neck of the gallbladder was grasped and retracted  laterally exposing the Calot's triangle. The cystic duct, cystic artery  and neck of the gallbladder were all clearly identified and dissected  circumferentially exposing the critical view of safety. The cystic  artery had to be clipped and divided first, as it was overlying the  cystic duct and that would have made cholangiogram not possible. A clip  was then placed high and lateral in the cystic duct and just medial to  this, was incised with laparoscopic scissors; however, there was a small  vessel running along the cystic duct that once the duct was incised,  there was too much blood spilling into the cystic duct lumen to allow  for adequate cholangiogram for hemostasis. I was unable to perform the  cholangiogram and the cystic duct was then clipped three times medially  and then divided. This did control the bleeding. The peritoneal  attachments between the liver and gallbladder were lysed with  electrocautery. The gallbladder was then removed from its bed, placed  into an Endobag, removed through one of the trocars and placed on the  table as specimen. The gallbladder fossa was inspected for hemostasis,  which had been obtained with pressure to the pneumoperitoneum and  cautery. The gallbladder fossa and perihepatic space were irrigated and  suction until the irrigant was clear. The remainder of the abdomen was  inspected including all trocars and no other injuries or abnormalities  were noted.   A [employer] suture passer was used to pass the PDS suture  through the umbilical fascial defect with two interrupted sutures. The  abdomen was then allowed to desufflate and all trocars were then removed  under direct vision. Local anesthetic was injected into the wounds and  all skin incisions were closed with 4-0 suture. The wound was then  cleaned and dressed with Dermabond. The patient tolerated the procedure  well and was transferred to the PACU in stable condition. SPECIMENS:  Gallbladder and bile for culture. DRAINS:  None. COMPLICATIONS:  None. ESTIMATED BLOOD LOSS:  Less than 100 mL.         Juarez New MD    D: 11/03/2020 16:36:54       T: 11/03/2020 22:40:52     [email or web address]  Job#: [id number]     Doc#: [id number]    CC:

## 2020-11-04 NOTE — PROGRESS NOTES
Sitting up in bed eating lunch and watching TV, pt is alert and oriented and c/o pain which she rates 9/10. Given 2 Oxycodone 5mg PO per pt request.  Call light in reach, will continue to monitor.

## 2020-11-04 NOTE — CARE COORDINATION
Patient admitted with an anticipated short hospitalization length of stay. Chart reviewed and it appears that patient has minimal needs for discharge at this time. Discussed with patients nurse and requested that case management be notified if discharge needs are identified. *Case management will continue to follow progress and update discharge plan as needed.     Electronically signed by CHANTE Mccarthy on 11/4/2020 at 9:13 AM

## 2020-11-05 NOTE — PROGRESS NOTES
Patient provided with discharge instructions, discussed in detail, new medications reviewed including use and side effects. Patient verbalized understanding. Prescription for oxycodone 5 mg provided. PIV removed. All questions answered, [facility] home via private car. Patient discharged home on 11/4/2020 at 2030.     Discharging unit phone number [phone]

## 2020-11-05 NOTE — DISCHARGE SUMMARY
[facility]        Discharge Summary    Patient Identification  Patient Name: Jameson Roche  MRN: [id number]  YOB: 1975 45 y.o. female  Admission date: 11/3/2020  7:23 AM   Discharge date:   11/4/2020                                   Discharge Disposition: home    Discharge Condition: good    Discharge Diagnoses:   Patient Active Problem List   Diagnosis    H/O myringotomy    Tobacco use disorder    Sleep apnea syndrome    Hemorrhoids    Family history of premature CAD    Adnexal mass    Intractable migraine without aura and with status migrainosus    Pure hypercholesterolemia    Acute cholecystitis    Acute calculous cholecystitis       Consults: IP CONSULT TO GENERAL SURGERY     Operations/Procedures: OR Date 11/3/20, laparoscopic cholecystectomy for acute cholecystitis    Patient Instructions: Activity: No heavy lifting over 20 lbs for 6 weeks from date of surgery  Diet: regular diet as tolerated  Wound Care: Surgical glue over incisions. May shower normally and avoid scrubbing over incisions and avoid hot tubs, pools, open water until seen in office  Follow-up with Dr. Sheila Beach in  2 weeks. Discharge Medications:      Belen Segal   Home Medication Instructions RNV:[id number]    Printed on:11/04/20 1953   Medication Information                      meclizine (ANTIVERT) 25 MG tablet  Take 1 tablet by mouth 3 times daily as needed for Dizziness             naproxen sodium (ALEVE) 220 MG tablet  Take 2 tablets by mouth 2 times daily as needed              oxyCODONE (ROXICODONE) 5 MG immediate release tablet  Take 1 tablet by mouth every 6 hours as needed for Pain for up to 5 days. Take lowest dose possible to manage pain; may stop taking sooner than 7 days if pain is able to be controlled with non-narcotic analgesics and therapies.              promethazine (PHENERGAN) 25 MG tablet  Take 25 mg by mouth 4 times daily as needed for Nausea HPI and Hospital Course:     Pt admitted and underwent OR Date 11/3/20, laparoscopic cholecystectomy for acute cholecystitis. She tolerated this surgery and the post op period well. She is discharged to home with normal a temperature and vitals, tolerating an oral diet well, and having normal bowel activity. Abdominal exam: Soft, non-distended, appropriate incisional tenderness, incisions clean dry and intact  Follow up is planned, and all questions have been answered. Dakota Minor 6  Aniyah Solomon MD, FACS  11/4/2020  7:53 PM

## 2020-11-05 NOTE — PROGRESS NOTES
Physician Progress Note      Michelle Giovanni  [facility] #:                  [id number]  :                       1975  ADMIT DATE:       11/3/2020 7:23 AM  DISCH DATE:        2020 8:56 PM  RESPONDING  PROVIDER #:        Aby Rhodes CNP          QUERY TEXT:    Dear Dr. Marcia Rojas,    Pt admitted with cholecystitis. Pt noted to have laparoscopic cholecystectomy   with a decrease in H&H from 14.2/41. 5 pre op to 11.7/34.5 post op. If   possible, please document in the progress notes and discharge summary if you   are evaluating and/or treating any of the following: The medical record reflects the following:  Risk Factors: surgical procedure  Clinical Indicators: Decrease in H&H from 14.2/41. 5 pre op to 11.7/34.5 post   op. Treatment: Monitoring of lab work. Thank you. [email or web address]  Options provided:  -- Postoperative acute blood loss anemia  -- Acute blood loss anemia  -- Chronic blood loss anemia  -- Dilutional anemia  -- Precipitous drop in Hemoglobin and Hematocrit  -- Other - I will add my own diagnosis  -- Disagree - Not applicable / Not valid  -- Disagree - Clinically unable to determine / Unknown  -- Refer to Clinical Documentation Reviewer    PROVIDER RESPONSE TEXT:    This patient has postoperative acute blood loss anemia.     Query created by: Froylan Sanders on 2020 3:38 PM      Electronically signed by:  Aby Rhodes CNP 2020 1:06 PM

## 2020-11-06 ENCOUNTER — TELEPHONE (OUTPATIENT)
Dept: FAMILY MEDICINE CLINIC | Age: 45
End: 2020-11-06

## 2020-11-06 NOTE — TELEPHONE ENCOUNTER
[facility] Transitions Initial Follow Up Call    Outreach made within 2 business days of discharge: Yes    Patient: Jameson Roche Patient : 1975   MRN: [id number]  Reason for Admission: There are no discharge diagnoses documented for the most recent discharge. Discharge Date: 20       Spoke with: [facility]    Discharge department/facility: [address] [place] [address] Interactive Patient Contact:  Was patient able to fill all prescriptions: Yes  Was patient instructed to bring all medications to the follow-up visit:   Is patient taking all medications as directed in the discharge summary?    Does patient understand their discharge instructions: Yes  Does patient have questions or concerns that need addressed prior to 7-14 day follow up office visit: no    Scheduled appointment with PCP within 7-14 days    Follow Up  Future Appointments   Date Time Provider Declan Avendaño   2020  9:00 AM Victoriano Villa MD [facility] [place], [place]

## 2020-11-08 LAB
ANAEROBIC CULTURE: NORMAL
CULTURE SURGICAL: NORMAL
GRAM STAIN RESULT: NORMAL

## 2020-11-09 NOTE — TELEPHONE ENCOUNTER
Patient notified, order for U/S is in [address], gave number to call and schedule. Patient would like to cancel VV for Wednesday.

## 2020-11-12 ENCOUNTER — HOSPITAL ENCOUNTER (OUTPATIENT)
Dept: ULTRASOUND IMAGING | Age: 45
Discharge: HOME | End: 2020-11-12
Payer: COMMERCIAL

## 2020-11-12 ENCOUNTER — TELEPHONE (OUTPATIENT)
Dept: FAMILY MEDICINE CLINIC | Age: 45
End: 2020-11-12

## 2020-11-12 PROCEDURE — 76770 US EXAM ABDO BACK WALL COMP: CPT

## 2020-11-12 NOTE — TELEPHONE ENCOUNTER
----- Message from Vanesa Lombardi MD sent at 11/12/2020  6:14 PM EST -----  Spoke with pt  Urgent referral to urology  Send u/s and CT report

## 2020-11-16 ENCOUNTER — OFFICE VISIT (OUTPATIENT)
Dept: SURGERY | Age: 45
End: 2020-11-16

## 2020-11-16 VITALS
BODY MASS INDEX: 34.34 KG/M2 | TEMPERATURE: 97.2 F | DIASTOLIC BLOOD PRESSURE: 84 MMHG | SYSTOLIC BLOOD PRESSURE: 110 MMHG | WEIGHT: 170 LBS

## 2020-11-16 PROCEDURE — 99024 POSTOP FOLLOW-UP VISIT: CPT | Performed by: SURGERY

## 2020-11-16 NOTE — PROGRESS NOTES
Smokeless tobacco: Never Used   Substance and Sexual Activity    Alcohol use: No     Comment: rare    Drug use: No    Sexual activity: Yes     Partners: Male   Lifestyle    Physical activity     Days per week: Not on file     Minutes per session: Not on file    Stress: Not on file   Relationships    Social connections     Talks on phone: Not on file     Gets together: Not on file     Attends Taoism service: Not on file     Active member of club or organization: Not on file     Attends meetings of clubs or organizations: Not on file     Relationship status: Not on file    Intimate partner violence     Fear of current or ex partner: Not on file     Emotionally abused: Not on file     Physically abused: Not on file     Forced sexual activity: Not on file   Other Topics Concern    Not on file   Social History Narrative    Not on file      Family History   Problem Relation Age of Onset    Heart Disease Father 39    High Blood Pressure Mother     High Cholesterol Mother     Cancer Other     Alcohol Abuse Other     Stroke Other     High Blood Pressure Other     Lung Cancer Maternal Grandmother 60        +smoker    Colon Cancer Maternal Grandmother     No Known Problems Brother     No Known Problems Brother     No Known Problems Brother      Current Outpatient Medications   Medication Sig Dispense Refill    promethazine (PHENERGAN) 25 MG tablet Take 25 mg by mouth 4 times daily as needed for Nausea      meclizine (ANTIVERT) 25 MG tablet Take 1 tablet by mouth 3 times daily as needed for Dizziness 60 tablet 3    naproxen sodium (ALEVE) 220 MG tablet Take 2 tablets by mouth 2 times daily as needed        No current facility-administered medications for this visit.        No Known Allergies     Review of Systems:  Review of systems performed and negative with the exception of the above findings    OBJECTIVE:  /84   Temp 97.2 °F (36.2 °C) (Infrared)   Wt 170 lb (77.1 kg)   LMP 03/29/2018 BMI 34.34 kg/m²      Physical Exam:  General appearance: alert, appears stated age, cooperative and no distress  Abdomen: soft, non-distended, appropriate incisional tenderness, incisions clean dry and intact    Admission on 11/03/2020, Discharged on 11/04/2020   Component Date Value Ref Range Status    WBC 11/03/2020 11.7* 4.0 - 11.0 K/uL Final    RBC 11/03/2020 4.67  4.00 - 5.20 M/uL Final    Hemoglobin 11/03/2020 14.2  12.0 - 16.0 g/dL Final    Hematocrit 11/03/2020 41.5  36.0 - 48.0 % Final    MCV 11/03/2020 89.0  80.0 - 100.0 fL Final    MCH 11/03/2020 30.5  26.0 - 34.0 pg Final    MCHC 11/03/2020 34.3  31.0 - 36.0 g/dL Final    RDW 11/03/2020 13.8  12.4 - 15.4 % Final    Platelets 73/00/0041 312  135 - 450 K/uL Final    MPV 11/03/2020 7.7  5.0 - 10.5 fL Final    Neutrophils % 11/03/2020 73.8  % Final    Lymphocytes % 11/03/2020 19.0  % Final    Monocytes % 11/03/2020 6.4  % Final    Eosinophils % 11/03/2020 0.3  % Final    Basophils % 11/03/2020 0.5  % Final    Neutrophils Absolute 11/03/2020 8.6* 1.7 - 7.7 K/uL Final    Lymphocytes Absolute 11/03/2020 2.2  1.0 - 5.1 K/uL Final    Monocytes Absolute 11/03/2020 0.8  0.0 - 1.3 K/uL Final    Eosinophils Absolute 11/03/2020 0.0  0.0 - 0.6 K/uL Final    Basophils Absolute 11/03/2020 0.1  0.0 - 0.2 K/uL Final    Sodium 11/03/2020 143  136 - 145 mmol/L Final    Potassium 11/03/2020 3.5  3.5 - 5.1 mmol/L Final    Chloride 11/03/2020 102  99 - 110 mmol/L Final    CO2 11/03/2020 27  21 - 32 mmol/L Final    Anion Gap 11/03/2020 14  3 - 16 Final    Glucose 11/03/2020 104* 70 - 99 mg/dL Final    BUN 11/03/2020 11  7 - 20 mg/dL Final    CREATININE 11/03/2020 <0.5* 0.6 - 1.1 mg/dL Final    GFR Non- 11/03/2020 >60  >60 Final    Comment: >60 mL/min/1.73m2 EGFR, calc. for ages 25 and older using the  MDRD formula (not corrected for weight), is valid for stable  renal function.       GFR  11/03/2020 >60  >60 Final Comment: Chronic Kidney Disease: less than 60 ml/min/1.73 sq.m. Kidney Failure: less than 15 ml/min/1.73 sq.m. Results valid for patients 18 years and older.  Calcium 11/03/2020 10.4  8.3 - 10.6 mg/dL Final    Total Protein 11/03/2020 8.1  6.4 - 8.2 g/dL Final    Alb 11/03/2020 5.1* 3.4 - 5.0 g/dL Final    Albumin/Globulin Ratio 11/03/2020 1.7  1.1 - 2.2 Final    Total Bilirubin 11/03/2020 <0.2  0.0 - 1.0 mg/dL Final    Alkaline Phosphatase 11/03/2020 105  40 - 129 U/L Final    ALT 11/03/2020 28  10 - 40 U/L Final    AST 11/03/2020 17  15 - 37 U/L Final    Globulin 11/03/2020 3.0  g/dL Final    Lipase 11/03/2020 28.0  13.0 - 60.0 U/L Final    hCG Qual 11/03/2020 Negative  Detects HCG level >10 MIU/mL Final    Color, UA 11/03/2020 YELLOW  Straw/Yellow Final    Clarity, UA 11/03/2020 Clear  Clear Final    Glucose, Ur 11/03/2020 Negative  Negative mg/dL Final    Bilirubin Urine 11/03/2020 Negative  Negative Final    Ketones, Urine 11/03/2020 15* Negative mg/dL Final    Specific [place], UA 11/03/2020 >1.030  1.005 - 1.030 Final    Blood, Urine 11/03/2020 Negative  Negative Final    pH, UA 11/03/2020 7.5  5.0 - 8.0 Final    Protein, UA 11/03/2020 TRACE* Negative mg/dL Final    Urobilinogen, Urine 11/03/2020 0.2  <2.0 E.U./dL Final    Nitrite, Urine 11/03/2020 Negative  Negative Final    Leukocyte Esterase, Urine 11/03/2020 Negative  Negative Final    Microscopic Examination 11/03/2020 YES   Final    Urine Type 11/03/2020 NotGiven   Final    Urine received in a container without preservatives.  Urine Reflex to Culture 11/03/2020 Not Indicated   Final    RBC, UA 11/03/2020 0-2  0 - 4 /HPF Final    Hyaline Casts, UA 11/03/2020 5  0 - 8 /LPF Final    WBC, UA 11/03/2020 1  0 - 5 /HPF Final    Epithelial Cells, UA 11/03/2020 1  0 - 5 /HPF Final    Comment: Urinalysis microscopic performed using the  automated methodology (AUWI analyzer).       Gram Stain Result 11/03/2020 No WBCs or organisms seen   Final    Culture Surgical 11/03/2020 No growth 60 to 72 hours   Final    Anaerobic Culture 11/03/2020 No anaerobes isolated   Final    Fungus (Mycology) Culture 11/03/2020 No growth after 1 week/s of incubation. Preliminary    Fungus Stain 11/03/2020 No Fungal elements seen   Final    AFB Smear 11/03/2020 No AFB observed by Fluorescent stain   Final    Sodium 11/04/2020 139  136 - 145 mmol/L Final    Potassium reflex Magnesium 11/04/2020 3.8  3.5 - 5.1 mmol/L Final    Chloride 11/04/2020 104  99 - 110 mmol/L Final    CO2 11/04/2020 25  21 - 32 mmol/L Final    Anion Gap 11/04/2020 10  3 - 16 Final    Glucose 11/04/2020 137* 70 - 99 mg/dL Final    BUN 11/04/2020 6* 7 - 20 mg/dL Final    CREATININE 11/04/2020 0.5* 0.6 - 1.1 mg/dL Final    GFR Non- 11/04/2020 >60  >60 Final    Comment: >60 mL/min/1.73m2 EGFR, calc. for ages 25 and older using the  MDRD formula (not corrected for weight), is valid for stable  renal function.  GFR  11/04/2020 >60  >60 Final    Comment: Chronic Kidney Disease: less than 60 ml/min/1.73 sq.m. Kidney Failure: less than 15 ml/min/1.73 sq.m. Results valid for patients 18 years and older.       Calcium 11/04/2020 9.5  8.3 - 10.6 mg/dL Final    Total Protein 11/04/2020 6.2* 6.4 - 8.2 g/dL Final    Alb 11/04/2020 3.9  3.4 - 5.0 g/dL Final    Albumin/Globulin Ratio 11/04/2020 1.7  1.1 - 2.2 Final    Total Bilirubin 11/04/2020 0.4  0.0 - 1.0 mg/dL Final    Alkaline Phosphatase 11/04/2020 121  40 - 129 U/L Final    ALT 11/04/2020 202* 10 - 40 U/L Final    AST 11/04/2020 113* 15 - 37 U/L Final    Globulin 11/04/2020 2.3  g/dL Final    Lipase 11/04/2020 15.0  13.0 - 60.0 U/L Final    WBC 11/04/2020 9.9  4.0 - 11.0 K/uL Final    RBC 11/04/2020 3.79* 4.00 - 5.20 M/uL Final    Hemoglobin 11/04/2020 11.7* 12.0 - 16.0 g/dL Final    Hematocrit 11/04/2020 34.5* 36.0 - 48.0 % Final    MCV 11/04/2020 90.9  80.0 - from the inferior left kidney. Between the ultrasound appearance and CT appearance, this is concerning for renal neoplasm. Recommend urologic consultation. Ct Abdomen Pelvis W Iv Contrast Additional Contrast? None    Result Date: 11/3/2020  EXAMINATION: CT OF THE ABDOMEN AND PELVIS WITH CONTRAST 11/3/2020 9:46 am TECHNIQUE: CT of the abdomen and pelvis was performed with the administration of intravenous contrast. Multiplanar reformatted images are provided for review. Dose modulation, iterative reconstruction, and/or weight based adjustment of the mA/kV was utilized to reduce the radiation dose to as low as reasonably achievable. COMPARISON: Gallbladder ultrasound from 11/03/2020 HISTORY: ORDERING SYSTEM PROVIDED HISTORY: ruq pain TECHNOLOGIST PROVIDED HISTORY: If patient is on cardiac monitor and/or pulse ox, they may be taken off cardiac monitor and pulse ox, left on O2 if currently on. All monitors reattached when patient returns to room. Additional Contrast?->None Reason for exam:->ruq pain Reason for Exam: ruq pain Acuity: Unknown Type of Exam: Unknown 57-year-old female with right upper quadrant abdominal pain FINDINGS: Lower Chest: Mild bibasilar atelectasis and respiratory motion. No free intra-abdominal air. Small hiatal hernia. Organs: Gallstones and sludge distending the gallbladder with trace pericholecystic fluid and gallbladder wall thickening/irregularity. Fatty liver. Mild intrahepatic biliary ductal dilation. Scattered soft tissue calcifications. Adrenal glands, pancreas, and right kidney grossly unremarkable in appearance. Irregular heterogeneous lesion at the inferior pole of the left kidney measuring 2 cm on image 74, series 2. No obstructing calculus, hydronephrosis, or hydroureter. GI/Bowel: Normal appendix. No significant dilation of small bowel loops to suggest small bowel obstruction. Mild-to-moderate stool burden. Pelvis: Urinary bladder is collapsed. Pelvic phleboliths.   No free fluid in the pelvis. No inguinal or pelvic sidewall lymphadenopathy. Peritoneum/Retroperitoneum: Abdominal aorta normal in appearance and caliber. No retroperitoneal lymphadenopathy. Psoas muscles normal in size and symmetric in appearance. Bones/Soft Tissues: Mild degenerative changes within the spine. Small midline fat-containing periumbilical hernia. 1. Gallstones and gallbladder sludge with irregularity and thickening of the gallbladder wall concerning for acute cholecystitis. Further evaluation with nuclear medicine HIDA scan recommended. 2. Suspected 2 cm solid mass lesion at the inferior pole of the left kidney concerning for solid renal mass/renal cell carcinoma. Initial further evaluation with renal ultrasound is recommended. Eventual further evaluation with CT or MR renal mass protocol will likely be necessary. 3. Mild intrahepatic biliary ductal dilation. 4. Old granulomatous disease. 5. Small midline fat-containing periumbilical hernia. 6. Normal appendix. 7. Small hiatal hernia. 8. Fatty liver. Us Gallbladder Ruq    Result Date: 11/3/2020  EXAMINATION: RIGHT UPPER QUADRANT ULTRASOUND 11/3/2020 8:14 am COMPARISON: June 19, 2020 HISTORY: ORDERING SYSTEM PROVIDED HISTORY: RUQ PAIN TECHNOLOGIST PROVIDED HISTORY: Reason for exam:->RUQ PAIN Reason for Exam: ruq pain Acuity: Unknown Type of Exam: Unknown FINDINGS: The right kidney measures 11 cm x 5.1 cm. There is no hydronephrosis. The echogenicity of the renal cortex is within normal limits. The gallbladder demonstrates shadowing gallstones. There is no pericholecystic fluid. There is a negative sonographic Dick's sign on examination. The common bile duct measures up to 5 mm. The main portal vein is patent. The visualized portions of the liver are unremarkable without any mass lesions or gross intrahepatic biliary ductal dilatation. The gallbladder wall is slightly prominent appearing on image 18 measuring up to 5 mm. Somewhat limited study due to technique and patient cooperation. Gallstones seen with slight prominence of the gallbladder wall. There was a negative sonographic Dick's sign on examination. Recommend comparison with clinical exam and follow-up with HIDA scan. Non-visualized pancreas. Pathology:  FINAL DIAGNOSIS:     Gallbladder, cholecystectomy:   - Acute cholecystitis with histiocytic reaction, moderate. - Cholelithiasis. MAYTE/MAYTE       Assessment:  laparoscopic cholecystectomy with cholangiogram for acute cholecystitis on November 3, 2020  Left renal mass    Plan:  No heavy lifting over 20lbs for 6 weeks total postop  Diet and activity as tolerated otherwise  OK to return to work for light duty  Follow up with general surgery office as needed  Follow with urology later this week regarding renal mass    Ronnie Parekh MD, FACS  11/16/2020  9:15 AM

## 2020-12-01 ENCOUNTER — OFFICE VISIT (OUTPATIENT)
Dept: FAMILY MEDICINE CLINIC | Age: 45
End: 2020-12-01
Payer: COMMERCIAL

## 2020-12-01 VITALS
HEIGHT: 59 IN | SYSTOLIC BLOOD PRESSURE: 124 MMHG | OXYGEN SATURATION: 100 % | BODY MASS INDEX: 35.48 KG/M2 | WEIGHT: 176 LBS | DIASTOLIC BLOOD PRESSURE: 80 MMHG | TEMPERATURE: 97.7 F | HEART RATE: 94 BPM

## 2020-12-01 PROBLEM — K76.0 FATTY LIVER: Status: ACTIVE | Noted: 2020-12-01

## 2020-12-01 PROCEDURE — G8427 DOCREV CUR MEDS BY ELIG CLIN: HCPCS | Performed by: FAMILY MEDICINE

## 2020-12-01 PROCEDURE — G8417 CALC BMI ABV UP PARAM F/U: HCPCS | Performed by: FAMILY MEDICINE

## 2020-12-01 PROCEDURE — G8484 FLU IMMUNIZE NO ADMIN: HCPCS | Performed by: FAMILY MEDICINE

## 2020-12-01 PROCEDURE — 99212 OFFICE O/P EST SF 10 MIN: CPT | Performed by: FAMILY MEDICINE

## 2020-12-01 RX ORDER — M-VIT,TX,IRON,MINS/CALC/FOLIC 27MG-0.4MG
1 TABLET ORAL DAILY
COMMUNITY

## 2020-12-01 RX ORDER — AMPICILLIN TRIHYDRATE 250 MG
CAPSULE ORAL
COMMUNITY

## 2020-12-01 ASSESSMENT — PATIENT HEALTH QUESTIONNAIRE - PHQ9
3. TROUBLE FALLING OR STAYING ASLEEP: 3
1. LITTLE INTEREST OR PLEASURE IN DOING THINGS: 2
9. THOUGHTS THAT YOU WOULD BE BETTER OFF DEAD, OR OF HURTING YOURSELF: 0
7. TROUBLE CONCENTRATING ON THINGS, SUCH AS READING THE NEWSPAPER OR WATCHING TELEVISION: 0
10. IF YOU CHECKED OFF ANY PROBLEMS, HOW DIFFICULT HAVE THESE PROBLEMS MADE IT FOR YOU TO DO YOUR WORK, TAKE CARE OF THINGS AT HOME, OR GET ALONG WITH OTHER PEOPLE: 0
6. FEELING BAD ABOUT YOURSELF - OR THAT YOU ARE A FAILURE OR HAVE LET YOURSELF OR YOUR FAMILY DOWN: 0
4. FEELING TIRED OR HAVING LITTLE ENERGY: 3
2. FEELING DOWN, DEPRESSED OR HOPELESS: 1
5. POOR APPETITE OR OVEREATING: 0
8. MOVING OR SPEAKING SO SLOWLY THAT OTHER PEOPLE COULD HAVE NOTICED. OR THE OPPOSITE, BEING SO FIGETY OR RESTLESS THAT YOU HAVE BEEN MOVING AROUND A LOT MORE THAN USUAL: 1
SUM OF ALL RESPONSES TO PHQ QUESTIONS 1-9: 10
SUM OF ALL RESPONSES TO PHQ QUESTIONS 1-9: 10
SUM OF ALL RESPONSES TO PHQ9 QUESTIONS 1 & 2: 3
SUM OF ALL RESPONSES TO PHQ QUESTIONS 1-9: 10

## 2020-12-01 NOTE — PROGRESS NOTES
Preoperative Consultation    Chief Complaint   Patient presents with    Pre-op Exam       This patient presents to the office today for a preoperative consultation at the request of surgeon, Dr. Pavel Nelson, who plans on performing ROBOTIC LAPAROSCOPIC PARTIAL LEFT NEPHRECTOMY   on December 17 at [facility]. Planned anesthesia: General   Known anesthesia problems: None, no family history of anesthesia problems.   Bleeding risk: No recent or remote history of abnormal bleeding  Personal or FH of DVT/PE: No      Patient Active Problem List   Diagnosis    H/O myringotomy    Tobacco use disorder    Sleep apnea syndrome    Hemorrhoids    Family history of premature CAD    Adnexal mass    Intractable migraine without aura and with status migrainosus    Pure hypercholesterolemia    Acute cholecystitis    Acute calculous cholecystitis    Fatty liver       Past Medical History:   Diagnosis Date    Gastroparesis     GERD (gastroesophageal reflux disease)     H/O myringotomy     w/tube placement     Sleep apnea syndrome     no c-pap    Tobacco use disorder      Past Surgical History:   Procedure Laterality Date    ADENOIDECTOMY      CHOLECYSTECTOMY, LAPAROSCOPIC N/A 11/3/2020    LAPAROSCOPIC CHOLECYSTECTOMY performed by Jillian Soares MD at [facility] COLONOSCOPY N/A 7/9/2020    COLONOSCOPY POLYPECTOMY SNARE/COLD BIOPSY performed by Aurea Sun MD at [address] [facility]  7/9/2020    COLONOSCOPY WITH BIOPSY performed by Aurea Sun MD at [facility]  11/30/11    DILATION AND CURETTAGE OF UTERUS  5/24/12    HYSTERECTOMY, TOTAL ABDOMINAL  04/05/2018    total hysterectomy, BSO    TYMPANOSTOMY TUBE PLACEMENT      UPPER GASTROINTESTINAL ENDOSCOPY N/A 7/9/2020    EGD BIOPSY performed by Aurea Sun MD at [address] [place] [address]     Family History   Problem Relation Age of Onset    Heart Disease Father 39    High Blood Pressure Mother     High Cholesterol Mother     Cancer Other     Alcohol Abuse Other     Stroke Other     High Blood Pressure Other     Lung Cancer Maternal Grandmother 61        +smoker    Colon Cancer Maternal Grandmother     No Known Problems Brother     No Known Problems Brother     No Known Problems Brother        No Known Allergies  Outpatient Medications Marked as Taking for the 12/1/20 encounter (Office Visit) with Aly Sprague MD   Medication Sig Dispense Refill    Multiple Vitamins-Minerals (THERAPEUTIC MULTIVITAMIN-MINERALS) tablet Take 1 tablet by mouth daily      Nutritional Supplements (ESTROVEN PO) Take by mouth      BLACK ELDERBERRY PO Take by mouth      Cinnamon 500 MG CAPS Take by mouth      promethazine (PHENERGAN) 25 MG tablet Take 25 mg by mouth 4 times daily as needed for Nausea      meclizine (ANTIVERT) 25 MG tablet Take 1 tablet by mouth 3 times daily as needed for Dizziness 60 tablet 3    naproxen sodium (ALEVE) 220 MG tablet Take 2 tablets by mouth 2 times daily as needed          Social History     Tobacco Use    Smoking status: Current Every Day Smoker     Packs/day: 0.50     Years: 14.00     Pack years: 7.00     Types: Cigarettes    Smokeless tobacco: Never Used   Substance Use Topics    Alcohol use: No     Comment: rare         REVIEW OF SYSTEMS:    Constitutional:  Feeling well, No fever, chills, no weight change, no fatigue  Eyes:  no vision loss/disturbance  Ears, nose, mouth, throat, and face:    No hearing change, no sore throat, no rhinorrhea, no nasal congestion  Respiratory:   no cough, no shortness of breath, no dyspnea on exertion,   Cardiovascular:   No chest pain, no palpitations, no irregular heart beat, + stable chronic edema  Gastrointestinal:  Has had more constipation since GB out, no pain, no nausea or vomiting, no blood or black tarry stool  Genitourinary:   No change in bladder, no nocturia  Hematologic/lymphatic:   No bleeding, no easy bruising  Musculoskeletal:    No joint pain  Behavioral/Psych:    + depression with everything that is happening with her health, covid, etc  Skin:    No rashes, no new lesions    PHYSICAL EXAM  Vitals:    12/01/20 1136   BP: 124/80   Site: Left Upper Arm   Position: Sitting   Cuff Size: Medium Adult   Pulse: 94   Temp: 97.7 °F (36.5 °C)   TempSrc: Tympanic   SpO2: 100%   Weight: 176 lb (79.8 kg)   Height: 4' 11\" (1.499 m)     Body mass index is 35.55 kg/m². CONSTITUTIONAL: Alert and oriented x 4 NAD, affect appropriate and obese, well hydrated, well developed. Eyes:  Lids and lashes normal, pupils equal, round and reactive to light, extra ocular muscles intact, sclera clear, conjunctiva normal    Head/ENT:  Normocephalic, without obvious abnormality, atramatic, external ears without lesions, Canals normal, TM clear bilaterally, oral pharynx with moist mucus membranes, tonsils without erythema or exudates, gums normal and good dentition. Neck:  Supple, symmetrical, trachea midline, no adenopathy, thyroid symmetric, not enlarged and no tenderness, skin normal    Heart: Regular rate and rhythm, normal S1 and S2, and no murmur noted    Lungs:  No increased work of breathing, good air exchange, clear to auscultation bilaterally    Abdomen:  Normal bowel sounds, soft, non-distended, non-tender, no masses palpated, no hepatosplenomegally    Extremities:  No loss of hair, no edema, nl pedal pulses bilaterally, no skin lesions    NEUROLOGIC:Cranial nerves II-XII are grossly intact. Motor is 5 out of 5 bilaterally.       PHQ-9 Total Score: 10 (12/1/2020 12:26 PM)  Thoughts that you would be better off dead, or of hurting yourself in some way: 0 (12/1/2020 12:26 PM)         Assessment:           ASSESSMENT AND PLAN:       Dl Moulton was seen today for pre-op exam.    Diagnoses and all orders for this visit:    Preop examination  Medically stable for planned procedure   Labs reviewed and ok    Left renal mass  - Comprehensive Metabolic Panel; Future    Current mild episode of major depressive disorder without prior episode ([address]  Dicussed meds, pt would like to wait, discussed looking into counseling, discussed EAP services at work  Will recheck in 2 months, sooner if needed    Tobacco dependence  STRONGLY encouraged to quit, lillian given possibility of renal CA  Pt states \"it's the thing keeping me going right now. \"  Discussed options to quit  Will discuss again next visit    Hyperglycemia  -     Hemoglobin A1C; Future    Anemia, unspecified type  -     CBC; Future    Elevated LFTs  -     Comprehensive Metabolic Panel; Future  Recheck labs    Fatty liver  Recheck labs  -Encourage low carb diet, watching calories, regular exercise and weight loss    Encounter for lipid screening for cardiovascular disease  -     Lipid Panel;  Future          Note per MARKO Forte and Scribe with corrections and edits per Gabriel Jaiver MD.  I agree with entirety of note and was present and performed history and physical.  I also confirm that the note above accurately reflects all work, treatment, procedures, and medical decision making performed by me, Gabriel Javier MD

## 2020-12-03 ENCOUNTER — HOSPITAL ENCOUNTER (OUTPATIENT)
Age: 45
Discharge: HOME | End: 2020-12-03
Payer: COMMERCIAL

## 2020-12-03 ENCOUNTER — HOSPITAL ENCOUNTER (OUTPATIENT)
Dept: GENERAL RADIOLOGY | Age: 45
Discharge: HOME | End: 2020-12-03
Payer: COMMERCIAL

## 2020-12-03 LAB
A/G RATIO: 1.7 (ref 1.1–2.2)
ABO/RH: NORMAL
ALBUMIN SERPL-MCNC: 4.5 G/DL (ref 3.4–5)
ALP BLD-CCNC: 103 U/L (ref 40–129)
ALT SERPL-CCNC: 17 U/L (ref 10–40)
ANION GAP SERPL CALCULATED.3IONS-SCNC: 10 MMOL/L (ref 3–16)
ANTIBODY SCREEN: NORMAL
APTT: 34.7 SEC (ref 24.2–36.2)
AST SERPL-CCNC: 14 U/L (ref 15–37)
BASOPHILS ABSOLUTE: 0 K/UL (ref 0–0.2)
BASOPHILS RELATIVE PERCENT: 0.6 %
BILIRUB SERPL-MCNC: 0.5 MG/DL (ref 0–1)
BUN BLDV-MCNC: 11 MG/DL (ref 7–20)
CALCIUM SERPL-MCNC: 10.1 MG/DL (ref 8.3–10.6)
CHLORIDE BLD-SCNC: 102 MMOL/L (ref 99–110)
CO2: 28 MMOL/L (ref 21–32)
CREAT SERPL-MCNC: <0.5 MG/DL (ref 0.6–1.1)
EOSINOPHILS ABSOLUTE: 0.2 K/UL (ref 0–0.6)
EOSINOPHILS RELATIVE PERCENT: 2.6 %
GFR AFRICAN AMERICAN: >60
GFR NON-AFRICAN AMERICAN: >60
GLOBULIN: 2.6 G/DL
GLUCOSE BLD-MCNC: 89 MG/DL (ref 70–99)
HCT VFR BLD CALC: 38.4 % (ref 36–48)
HEMOGLOBIN: 13.1 G/DL (ref 12–16)
INR BLD: 1.06 (ref 0.86–1.14)
LYMPHOCYTES ABSOLUTE: 2 K/UL (ref 1–5.1)
LYMPHOCYTES RELATIVE PERCENT: 29.8 %
MCH RBC QN AUTO: 30.8 PG (ref 26–34)
MCHC RBC AUTO-ENTMCNC: 34.1 G/DL (ref 31–36)
MCV RBC AUTO: 90.5 FL (ref 80–100)
MONOCYTES ABSOLUTE: 0.6 K/UL (ref 0–1.3)
MONOCYTES RELATIVE PERCENT: 8.5 %
NEUTROPHILS ABSOLUTE: 3.9 K/UL (ref 1.7–7.7)
NEUTROPHILS RELATIVE PERCENT: 58.5 %
PDW BLD-RTO: 13.8 % (ref 12.4–15.4)
PLATELET # BLD: 267 K/UL (ref 135–450)
PMV BLD AUTO: 8 FL (ref 5–10.5)
POTASSIUM SERPL-SCNC: 4.6 MMOL/L (ref 3.5–5.1)
PROTHROMBIN TIME: 12.3 SEC (ref 10–13.2)
RBC # BLD: 4.25 M/UL (ref 4–5.2)
SODIUM BLD-SCNC: 140 MMOL/L (ref 136–145)
TOTAL PROTEIN: 7.1 G/DL (ref 6.4–8.2)
WBC # BLD: 6.6 K/UL (ref 4–11)

## 2020-12-03 PROCEDURE — 86901 BLOOD TYPING SEROLOGIC RH(D): CPT

## 2020-12-03 PROCEDURE — 93005 ELECTROCARDIOGRAM TRACING: CPT | Performed by: UROLOGY

## 2020-12-03 PROCEDURE — 85730 THROMBOPLASTIN TIME PARTIAL: CPT

## 2020-12-03 PROCEDURE — 86850 RBC ANTIBODY SCREEN: CPT

## 2020-12-03 PROCEDURE — 85610 PROTHROMBIN TIME: CPT

## 2020-12-03 PROCEDURE — 71046 X-RAY EXAM CHEST 2 VIEWS: CPT

## 2020-12-03 PROCEDURE — 86900 BLOOD TYPING SEROLOGIC ABO: CPT

## 2020-12-03 PROCEDURE — 83036 HEMOGLOBIN GLYCOSYLATED A1C: CPT

## 2020-12-03 PROCEDURE — 36415 COLL VENOUS BLD VENIPUNCTURE: CPT

## 2020-12-03 PROCEDURE — 80061 LIPID PANEL: CPT

## 2020-12-03 PROCEDURE — 80053 COMPREHEN METABOLIC PANEL: CPT

## 2020-12-03 PROCEDURE — 85025 COMPLETE CBC W/AUTO DIFF WBC: CPT

## 2020-12-04 DIAGNOSIS — Z13.6 ENCOUNTER FOR LIPID SCREENING FOR CARDIOVASCULAR DISEASE: ICD-10-CM

## 2020-12-04 DIAGNOSIS — Z13.220 ENCOUNTER FOR LIPID SCREENING FOR CARDIOVASCULAR DISEASE: ICD-10-CM

## 2020-12-04 LAB
CHOLESTEROL, TOTAL: 208 MG/DL (ref 0–199)
EKG ATRIAL RATE: 75 BPM
EKG DIAGNOSIS: NORMAL
EKG P AXIS: 29 DEGREES
EKG P-R INTERVAL: 144 MS
EKG Q-T INTERVAL: 372 MS
EKG QRS DURATION: 78 MS
EKG QTC CALCULATION (BAZETT): 415 MS
EKG R AXIS: 27 DEGREES
EKG T AXIS: 30 DEGREES
EKG VENTRICULAR RATE: 75 BPM
ESTIMATED AVERAGE GLUCOSE: 105.4 MG/DL
HBA1C MFR BLD: 5.3 %
HDLC SERPL-MCNC: 49 MG/DL (ref 40–60)
LDL CHOLESTEROL CALCULATED: 138 MG/DL
TRIGL SERPL-MCNC: 103 MG/DL (ref 0–150)
VLDLC SERPL CALC-MCNC: 21 MG/DL

## 2020-12-04 PROCEDURE — 93010 ELECTROCARDIOGRAM REPORT: CPT | Performed by: INTERNAL MEDICINE

## 2020-12-07 LAB
FUNGUS (MYCOLOGY) CULTURE: NORMAL
FUNGUS STAIN: NORMAL

## 2020-12-08 RX ORDER — ACETAMINOPHEN 500 MG
1000 TABLET ORAL EVERY 6 HOURS PRN
COMMUNITY

## 2020-12-08 NOTE — PROGRESS NOTES
Name_______________________________________Printed:____________________  Date and time of surgery____12/17/2020    1100____________________Arrival Time:____0900   MAIN____________   1. The instructions given regarding when and if a patient needs to stop oral intake prior to surgery varies. Follow the specific instructions you were given                  ___Nothing to eat or to drink after Midnight the night before.                             ____Endoscopy patient follow your DRS instructions-generally you will be doing a part of the prep after Midnight                   __X__Carbo loading or ERAS instructions will be given to select patients-if you have been given those instructions -please do the following                           The evening before your surgery after dinner before midnight drink 40 ounces of gatorade. If you are diabetic use sugar free. The morning of surgery drink 40 ounces of water. This needs to be finished 3 hours prior to your surgery start time. 2. Take the following pills with a small sip of water on the morning of surgery___________________________________________________                  Do not take blood pressure medications ending in pril or sartan the [place] prior to surgery or the morning of surgery_   3. Aspirin, Ibuprofen, Advil, Naproxen, Vitamin E and other Anti-inflammatory products and supplements should be stopped for 5 -7days before surgery or as directed by your physician. 4. Check with your Doctor regarding stopping Plavix, Coumadin,Eliquis, Lovenox,Effient,Pradaxa,Xarelto, Fragmin or other blood thinners and follow their instructions. 5. Do not smoke, and do not drink any alcoholic beverages 24 hours prior to surgery. This includes NA Beer. Refrain from the usage of any recreational drugs. 6. You may brush your teeth and gargle the morning of surgery. DO NOT SWALLOW WATER   7.  You MUST make arrangements for a responsible adult to stay on site while you are here and take you home after your surgery. You will not be allowed to leave alone or drive yourself home. It is strongly suggested someone stay with you the first 24 hrs. Your surgery will be cancelled if you do not have a ride home. 8. A parent/legal guardian must accompany a child scheduled for surgery and plan to stay at the hospital until the child is discharged. Please do not bring other children with you. 9. Please wear simple, loose fitting clothing to the hospital.  Taylor Dash not bring valuables (money, credit cards, checkbooks, etc.) Do not wear any makeup (including no eye makeup) or nail polish on your fingers or toes. 10. DO NOT wear any jewelry or piercings on day of surgery. All body piercing jewelry must be removed. 11. If you have ___dentures, they will be removed before going to the OR; we will provide you a container. If you wear ___contact lenses or _X__glasses, they will be removed; please bring a case for them. 12. Please see your family doctor/pediatrician for a history & physical and/or concerning medications. Bring any test results/reports from your physician's office. PCP__________________Phone___________H&P Appt. Date________             13 If you  have a Living Will and Durable Power of  for Healthcare, please bring in a copy. 15. Notify your Surgeon if you develop any illness between now and surgery  time, cough, cold, fever, sore throat, nausea, vomiting, etc.  Please notify your surgeon if you experience dizziness, shortness of breath or blurred vision between now & the time of your surgery             15. DO NOT shave your operative site 96 hours prior to surgery. For face & neck surgery, men may use an electric razor 48 hours prior to surgery. 16. Shower the night before or morning of surgery using an antibacterial soap or as you have been instructed.              17. To provide excellent care visitors will be limited to one in the room at any given time. 18.  Please bring picture ID and insurance card. 19.  Visit our web site for additional information:  [email or web address]              20.During flu season no children under the age of 15 are permitted in the hospital for the safety of all patients. 21. If you take a long acting insulin in the evening only  take half of your usual  dose the night  before your procedure              22. If you use a c-pap please bring DOS if staying overnight,             23.For your convenience [facility] has a pharmacy on site to fill your prescriptions. 24. If you use oxygen and have a portable tank please bring it  with you the DOS             25. Bring a complete list of all your medications with name and dose include any supplements. 26. Other__________________________________________   *Please call pre admission testing if you any further questions   [facility]         Connie Huber     [place] [phone] [facility]  [phone]   [address] [place] [address]       All above information reviewed with patient in person or by phone. Patient verbalizes understanding. All questions and concerns addressed. Patient/Rep___PATIENT_________________                                                                                                                             There is a one visitor policy at [facility] for all surgeries and endoscopies. Whether the visitor can stay or will be asked to wait in the car will depend on the current policy and if social distancing can be maintained. The policy is subject to change at any time. Please make sure the visitor has a cell phone that is on,charged and able to accept calls, as this may be the way that the staff communicates with them. Pain management is NO VISITOR policyThe patients ride is expected to remain in the car with a cell phone for communication. If the ride is leaving the hospital grounds please make sure they are back in time for pickup. Have the patient inform the staff on arrival what their rides plans are while the patient is in the facility. At the MAIN there is one visitor allowed. Please note that the visitor policy is subject to change. Patient instructed to get their COVID-19 test done as directed by their doctor (5-7 days prior to procedure)  or patient states will get on __12/10/2020 AT [facility], WILL FAX RESULT________. The day the COVID test is done is considered day one. Instructed to self quarantine after test until DOS.        PRE OP INSTRUCTIONS

## 2020-12-14 ENCOUNTER — TELEPHONE (OUTPATIENT)
Dept: FAMILY MEDICINE CLINIC | Age: 45
End: 2020-12-14

## 2020-12-17 ENCOUNTER — ANESTHESIA (OUTPATIENT)
Dept: OPERATING ROOM | Age: 45
End: 2020-12-17
Payer: COMMERCIAL

## 2020-12-17 ENCOUNTER — HOSPITAL ENCOUNTER (OUTPATIENT)
Age: 45
Setting detail: OBSERVATION
Discharge: HOME | End: 2020-12-18
Attending: UROLOGY | Admitting: UROLOGY
Payer: COMMERCIAL

## 2020-12-17 ENCOUNTER — ANESTHESIA EVENT (OUTPATIENT)
Dept: OPERATING ROOM | Age: 45
End: 2020-12-17
Payer: COMMERCIAL

## 2020-12-17 VITALS
SYSTOLIC BLOOD PRESSURE: 83 MMHG | RESPIRATION RATE: 25 BRPM | TEMPERATURE: 97.2 F | DIASTOLIC BLOOD PRESSURE: 44 MMHG | OXYGEN SATURATION: 100 %

## 2020-12-17 PROBLEM — N28.89 RENAL MASS, LEFT: Status: ACTIVE | Noted: 2020-12-17

## 2020-12-17 PROBLEM — N28.89 LEFT RENAL MASS: Status: ACTIVE | Noted: 2020-12-17

## 2020-12-17 LAB
ABO/RH: NORMAL
ANTIBODY SCREEN: NORMAL

## 2020-12-17 PROCEDURE — 6360000002 HC RX W HCPCS: Performed by: ANESTHESIOLOGY

## 2020-12-17 PROCEDURE — 6370000000 HC RX 637 (ALT 250 FOR IP)

## 2020-12-17 PROCEDURE — 2500000003 HC RX 250 WO HCPCS: Performed by: UROLOGY

## 2020-12-17 PROCEDURE — G0378 HOSPITAL OBSERVATION PER HR: HCPCS

## 2020-12-17 PROCEDURE — 6360000002 HC RX W HCPCS: Performed by: UROLOGY

## 2020-12-17 PROCEDURE — 6370000000 HC RX 637 (ALT 250 FOR IP): Performed by: NURSE ANESTHETIST, CERTIFIED REGISTERED

## 2020-12-17 PROCEDURE — 2580000003 HC RX 258: Performed by: UROLOGY

## 2020-12-17 PROCEDURE — 3600000009 HC SURGERY ROBOT BASE: Performed by: UROLOGY

## 2020-12-17 PROCEDURE — 36415 COLL VENOUS BLD VENIPUNCTURE: CPT

## 2020-12-17 PROCEDURE — 86901 BLOOD TYPING SEROLOGIC RH(D): CPT

## 2020-12-17 PROCEDURE — 86900 BLOOD TYPING SEROLOGIC ABO: CPT

## 2020-12-17 PROCEDURE — 6370000000 HC RX 637 (ALT 250 FOR IP): Performed by: UROLOGY

## 2020-12-17 PROCEDURE — 7100000000 HC PACU RECOVERY - FIRST 15 MIN: Performed by: UROLOGY

## 2020-12-17 PROCEDURE — 3700000001 HC ADD 15 MINUTES (ANESTHESIA): Performed by: UROLOGY

## 2020-12-17 PROCEDURE — 88307 TISSUE EXAM BY PATHOLOGIST: CPT

## 2020-12-17 PROCEDURE — 6360000002 HC RX W HCPCS: Performed by: NURSE ANESTHETIST, CERTIFIED REGISTERED

## 2020-12-17 PROCEDURE — 2500000003 HC RX 250 WO HCPCS: Performed by: NURSE ANESTHETIST, CERTIFIED REGISTERED

## 2020-12-17 PROCEDURE — 2720000010 HC SURG SUPPLY STERILE: Performed by: UROLOGY

## 2020-12-17 PROCEDURE — S2900 ROBOTIC SURGICAL SYSTEM: HCPCS | Performed by: UROLOGY

## 2020-12-17 PROCEDURE — 3600000019 HC SURGERY ROBOT ADDTL 15MIN: Performed by: UROLOGY

## 2020-12-17 PROCEDURE — 3700000000 HC ANESTHESIA ATTENDED CARE: Performed by: UROLOGY

## 2020-12-17 PROCEDURE — 86850 RBC ANTIBODY SCREEN: CPT

## 2020-12-17 PROCEDURE — 2580000003 HC RX 258: Performed by: NURSE ANESTHETIST, CERTIFIED REGISTERED

## 2020-12-17 PROCEDURE — 2709999900 HC NON-CHARGEABLE SUPPLY: Performed by: UROLOGY

## 2020-12-17 PROCEDURE — 7100000001 HC PACU RECOVERY - ADDTL 15 MIN: Performed by: UROLOGY

## 2020-12-17 RX ORDER — SODIUM CHLORIDE, SODIUM LACTATE, POTASSIUM CHLORIDE, CALCIUM CHLORIDE 600; 310; 30; 20 MG/100ML; MG/100ML; MG/100ML; MG/100ML
INJECTION, SOLUTION INTRAVENOUS CONTINUOUS
Status: DISCONTINUED | OUTPATIENT
Start: 2020-12-17 | End: 2020-12-17

## 2020-12-17 RX ORDER — MAGNESIUM HYDROXIDE 1200 MG/15ML
LIQUID ORAL CONTINUOUS PRN
Status: COMPLETED | OUTPATIENT
Start: 2020-12-17 | End: 2020-12-17

## 2020-12-17 RX ORDER — APREPITANT 40 MG/1
CAPSULE ORAL
Status: COMPLETED
Start: 2020-12-17 | End: 2020-12-17

## 2020-12-17 RX ORDER — MAGNESIUM HYDROXIDE 1200 MG/15ML
LIQUID ORAL
Status: COMPLETED | OUTPATIENT
Start: 2020-12-17 | End: 2020-12-17

## 2020-12-17 RX ORDER — SODIUM CHLORIDE, SODIUM LACTATE, POTASSIUM CHLORIDE, CALCIUM CHLORIDE 600; 310; 30; 20 MG/100ML; MG/100ML; MG/100ML; MG/100ML
INJECTION, SOLUTION INTRAVENOUS CONTINUOUS PRN
Status: DISCONTINUED | OUTPATIENT
Start: 2020-12-17 | End: 2020-12-17 | Stop reason: SDUPTHER

## 2020-12-17 RX ORDER — HYDROMORPHONE HCL 110MG/55ML
0.25 PATIENT CONTROLLED ANALGESIA SYRINGE INTRAVENOUS EVERY 5 MIN PRN
Status: DISCONTINUED | OUTPATIENT
Start: 2020-12-17 | End: 2020-12-17 | Stop reason: HOSPADM

## 2020-12-17 RX ORDER — M-VIT,TX,IRON,MINS/CALC/FOLIC 27MG-0.4MG
1 TABLET ORAL DAILY
Status: DISCONTINUED | OUTPATIENT
Start: 2020-12-17 | End: 2020-12-18 | Stop reason: HOSPADM

## 2020-12-17 RX ORDER — ACETAMINOPHEN 500 MG
500 TABLET ORAL EVERY 6 HOURS
Status: DISCONTINUED | OUTPATIENT
Start: 2020-12-17 | End: 2020-12-18 | Stop reason: HOSPADM

## 2020-12-17 RX ORDER — KETAMINE HYDROCHLORIDE 10 MG/ML
INJECTION, SOLUTION INTRAMUSCULAR; INTRAVENOUS PRN
Status: DISCONTINUED | OUTPATIENT
Start: 2020-12-17 | End: 2020-12-17 | Stop reason: SDUPTHER

## 2020-12-17 RX ORDER — ATROPA BELLADONNA AND OPIUM 16.2; 3 MG/1; MG/1
30 SUPPOSITORY RECTAL EVERY 8 HOURS PRN
Status: DISCONTINUED | OUTPATIENT
Start: 2020-12-17 | End: 2020-12-18 | Stop reason: HOSPADM

## 2020-12-17 RX ORDER — FENTANYL CITRATE 50 UG/ML
INJECTION, SOLUTION INTRAMUSCULAR; INTRAVENOUS PRN
Status: DISCONTINUED | OUTPATIENT
Start: 2020-12-17 | End: 2020-12-17 | Stop reason: SDUPTHER

## 2020-12-17 RX ORDER — APREPITANT 40 MG/1
CAPSULE ORAL PRN
Status: DISCONTINUED | OUTPATIENT
Start: 2020-12-17 | End: 2020-12-17 | Stop reason: SDUPTHER

## 2020-12-17 RX ORDER — SCOLOPAMINE TRANSDERMAL SYSTEM 1 MG/1
PATCH, EXTENDED RELEASE TRANSDERMAL PRN
Status: DISCONTINUED | OUTPATIENT
Start: 2020-12-17 | End: 2020-12-17 | Stop reason: SDUPTHER

## 2020-12-17 RX ORDER — HYDROMORPHONE HCL 110MG/55ML
PATIENT CONTROLLED ANALGESIA SYRINGE INTRAVENOUS PRN
Status: DISCONTINUED | OUTPATIENT
Start: 2020-12-17 | End: 2020-12-17 | Stop reason: SDUPTHER

## 2020-12-17 RX ORDER — LIDOCAINE HYDROCHLORIDE 20 MG/ML
INJECTION, SOLUTION EPIDURAL; INFILTRATION; INTRACAUDAL; PERINEURAL PRN
Status: DISCONTINUED | OUTPATIENT
Start: 2020-12-17 | End: 2020-12-17 | Stop reason: SDUPTHER

## 2020-12-17 RX ORDER — PHENAZOPYRIDINE HYDROCHLORIDE 100 MG/1
100 TABLET, FILM COATED ORAL EVERY 6 HOURS PRN
Status: DISCONTINUED | OUTPATIENT
Start: 2020-12-17 | End: 2020-12-18 | Stop reason: HOSPADM

## 2020-12-17 RX ORDER — PROPOFOL 10 MG/ML
INJECTION, EMULSION INTRAVENOUS PRN
Status: DISCONTINUED | OUTPATIENT
Start: 2020-12-17 | End: 2020-12-17 | Stop reason: SDUPTHER

## 2020-12-17 RX ORDER — DEXMEDETOMIDINE HYDROCHLORIDE 100 UG/ML
INJECTION, SOLUTION INTRAVENOUS PRN
Status: DISCONTINUED | OUTPATIENT
Start: 2020-12-17 | End: 2020-12-17 | Stop reason: SDUPTHER

## 2020-12-17 RX ORDER — LIDOCAINE HYDROCHLORIDE 40 MG/ML
SOLUTION TOPICAL PRN
Status: DISCONTINUED | OUTPATIENT
Start: 2020-12-17 | End: 2020-12-17 | Stop reason: SDUPTHER

## 2020-12-17 RX ORDER — HYDROCODONE BITARTRATE AND ACETAMINOPHEN 5; 325 MG/1; MG/1
1 TABLET ORAL
Status: DISCONTINUED | OUTPATIENT
Start: 2020-12-17 | End: 2020-12-17 | Stop reason: HOSPADM

## 2020-12-17 RX ORDER — ONDANSETRON 2 MG/ML
4 INJECTION INTRAMUSCULAR; INTRAVENOUS
Status: DISCONTINUED | OUTPATIENT
Start: 2020-12-17 | End: 2020-12-17 | Stop reason: HOSPADM

## 2020-12-17 RX ORDER — HYDROMORPHONE HYDROCHLORIDE 1 MG/ML
0.5 INJECTION, SOLUTION INTRAMUSCULAR; INTRAVENOUS; SUBCUTANEOUS EVERY 4 HOURS PRN
Status: DISCONTINUED | OUTPATIENT
Start: 2020-12-17 | End: 2020-12-18 | Stop reason: HOSPADM

## 2020-12-17 RX ORDER — MECLIZINE HCL 12.5 MG/1
25 TABLET ORAL 3 TIMES DAILY PRN
Status: DISCONTINUED | OUTPATIENT
Start: 2020-12-17 | End: 2020-12-18 | Stop reason: HOSPADM

## 2020-12-17 RX ORDER — LIDOCAINE HYDROCHLORIDE 10 MG/ML
0.5 INJECTION, SOLUTION EPIDURAL; INFILTRATION; INTRACAUDAL; PERINEURAL ONCE
Status: DISCONTINUED | OUTPATIENT
Start: 2020-12-17 | End: 2020-12-17 | Stop reason: HOSPADM

## 2020-12-17 RX ORDER — ACETAMINOPHEN 325 MG/1
650 TABLET ORAL ONCE
Status: DISCONTINUED | OUTPATIENT
Start: 2020-12-17 | End: 2020-12-17

## 2020-12-17 RX ORDER — SODIUM CHLORIDE 0.9 % (FLUSH) 0.9 %
10 SYRINGE (ML) INJECTION PRN
Status: DISCONTINUED | OUTPATIENT
Start: 2020-12-17 | End: 2020-12-18 | Stop reason: HOSPADM

## 2020-12-17 RX ORDER — HYDROMORPHONE HCL 110MG/55ML
0.5 PATIENT CONTROLLED ANALGESIA SYRINGE INTRAVENOUS EVERY 5 MIN PRN
Status: DISCONTINUED | OUTPATIENT
Start: 2020-12-17 | End: 2020-12-17 | Stop reason: HOSPADM

## 2020-12-17 RX ORDER — KETOROLAC TROMETHAMINE 30 MG/ML
30 INJECTION, SOLUTION INTRAMUSCULAR; INTRAVENOUS ONCE
Status: COMPLETED | OUTPATIENT
Start: 2020-12-17 | End: 2020-12-17

## 2020-12-17 RX ORDER — SODIUM CHLORIDE 0.9 % (FLUSH) 0.9 %
10 SYRINGE (ML) INJECTION EVERY 12 HOURS SCHEDULED
Status: DISCONTINUED | OUTPATIENT
Start: 2020-12-17 | End: 2020-12-18 | Stop reason: HOSPADM

## 2020-12-17 RX ORDER — METHOCARBAMOL 100 MG/ML
INJECTION, SOLUTION INTRAMUSCULAR; INTRAVENOUS PRN
Status: DISCONTINUED | OUTPATIENT
Start: 2020-12-17 | End: 2020-12-17 | Stop reason: SDUPTHER

## 2020-12-17 RX ORDER — OXYCODONE HYDROCHLORIDE 5 MG/1
TABLET ORAL
Status: COMPLETED
Start: 2020-12-17 | End: 2020-12-17

## 2020-12-17 RX ORDER — AMOXICILLIN 250 MG
1 CAPSULE ORAL 2 TIMES DAILY
Qty: 30 TABLET | Refills: 1 | Status: SHIPPED | OUTPATIENT
Start: 2020-12-17 | End: 2021-01-16

## 2020-12-17 RX ORDER — EPHEDRINE SULFATE/0.9% NACL/PF 50 MG/5 ML
SYRINGE (ML) INTRAVENOUS PRN
Status: DISCONTINUED | OUTPATIENT
Start: 2020-12-17 | End: 2020-12-17 | Stop reason: SDUPTHER

## 2020-12-17 RX ORDER — BUPIVACAINE HYDROCHLORIDE AND EPINEPHRINE 2.5; 5 MG/ML; UG/ML
INJECTION, SOLUTION EPIDURAL; INFILTRATION; INTRACAUDAL; PERINEURAL
Status: COMPLETED | OUTPATIENT
Start: 2020-12-17 | End: 2020-12-17

## 2020-12-17 RX ORDER — GLYCOPYRROLATE 1 MG/5 ML
SYRINGE (ML) INTRAVENOUS PRN
Status: DISCONTINUED | OUTPATIENT
Start: 2020-12-17 | End: 2020-12-17 | Stop reason: SDUPTHER

## 2020-12-17 RX ORDER — HYDROMORPHONE HYDROCHLORIDE 1 MG/ML
0.25 INJECTION, SOLUTION INTRAMUSCULAR; INTRAVENOUS; SUBCUTANEOUS
Status: DISCONTINUED | OUTPATIENT
Start: 2020-12-17 | End: 2020-12-18 | Stop reason: HOSPADM

## 2020-12-17 RX ORDER — HYDROCODONE BITARTRATE AND ACETAMINOPHEN 5; 325 MG/1; MG/1
1 TABLET ORAL EVERY 6 HOURS PRN
Qty: 20 TABLET | Refills: 0 | Status: SHIPPED | OUTPATIENT
Start: 2020-12-17 | End: 2020-12-22

## 2020-12-17 RX ORDER — SCOLOPAMINE TRANSDERMAL SYSTEM 1 MG/1
PATCH, EXTENDED RELEASE TRANSDERMAL
Status: COMPLETED
Start: 2020-12-17 | End: 2020-12-17

## 2020-12-17 RX ORDER — LIDOCAINE HYDROCHLORIDE 10 MG/ML
1 INJECTION, SOLUTION EPIDURAL; INFILTRATION; INTRACAUDAL; PERINEURAL
Status: CANCELLED | OUTPATIENT
Start: 2020-12-17 | End: 2020-12-17

## 2020-12-17 RX ORDER — VECURONIUM BROMIDE 1 MG/ML
INJECTION, POWDER, LYOPHILIZED, FOR SOLUTION INTRAVENOUS PRN
Status: DISCONTINUED | OUTPATIENT
Start: 2020-12-17 | End: 2020-12-17 | Stop reason: SDUPTHER

## 2020-12-17 RX ORDER — DEXTROSE AND SODIUM CHLORIDE 5; .45 G/100ML; G/100ML
INJECTION, SOLUTION INTRAVENOUS CONTINUOUS
Status: DISCONTINUED | OUTPATIENT
Start: 2020-12-17 | End: 2020-12-18 | Stop reason: HOSPADM

## 2020-12-17 RX ORDER — OXYCODONE HYDROCHLORIDE 5 MG/1
5 TABLET ORAL EVERY 4 HOURS PRN
Status: DISCONTINUED | OUTPATIENT
Start: 2020-12-17 | End: 2020-12-18 | Stop reason: HOSPADM

## 2020-12-17 RX ORDER — PROMETHAZINE HYDROCHLORIDE 25 MG/1
25 TABLET ORAL 4 TIMES DAILY PRN
Status: DISCONTINUED | OUTPATIENT
Start: 2020-12-17 | End: 2020-12-18 | Stop reason: HOSPADM

## 2020-12-17 RX ORDER — DEXAMETHASONE SODIUM PHOSPHATE 4 MG/ML
INJECTION, SOLUTION INTRA-ARTICULAR; INTRALESIONAL; INTRAMUSCULAR; INTRAVENOUS; SOFT TISSUE PRN
Status: DISCONTINUED | OUTPATIENT
Start: 2020-12-17 | End: 2020-12-17 | Stop reason: SDUPTHER

## 2020-12-17 RX ORDER — MIDAZOLAM HYDROCHLORIDE 1 MG/ML
INJECTION INTRAMUSCULAR; INTRAVENOUS PRN
Status: DISCONTINUED | OUTPATIENT
Start: 2020-12-17 | End: 2020-12-17 | Stop reason: SDUPTHER

## 2020-12-17 RX ORDER — SENNA AND DOCUSATE SODIUM 50; 8.6 MG/1; MG/1
1 TABLET, FILM COATED ORAL 2 TIMES DAILY
Status: DISCONTINUED | OUTPATIENT
Start: 2020-12-17 | End: 2020-12-18 | Stop reason: HOSPADM

## 2020-12-17 RX ORDER — ONDANSETRON 2 MG/ML
INJECTION INTRAMUSCULAR; INTRAVENOUS PRN
Status: DISCONTINUED | OUTPATIENT
Start: 2020-12-17 | End: 2020-12-17 | Stop reason: SDUPTHER

## 2020-12-17 RX ORDER — SODIUM CHLORIDE 9 MG/ML
INJECTION, SOLUTION INTRAVENOUS CONTINUOUS
Status: CANCELLED | OUTPATIENT
Start: 2020-12-17

## 2020-12-17 RX ADMIN — FENTANYL CITRATE 25 MCG: 50 INJECTION INTRAMUSCULAR; INTRAVENOUS at 15:03

## 2020-12-17 RX ADMIN — ONDANSETRON 4 MG: 2 INJECTION INTRAMUSCULAR; INTRAVENOUS at 15:06

## 2020-12-17 RX ADMIN — DEXMEDETOMIDINE HYDROCHLORIDE 5 MCG: 100 INJECTION, SOLUTION INTRAVENOUS at 15:00

## 2020-12-17 RX ADMIN — Medication 5 MG: at 14:29

## 2020-12-17 RX ADMIN — Medication 5 MG: at 14:07

## 2020-12-17 RX ADMIN — KETAMINE HYDROCHLORIDE 10 MG: 10 INJECTION, SOLUTION INTRAMUSCULAR; INTRAVENOUS at 14:10

## 2020-12-17 RX ADMIN — PHENYLEPHRINE HYDROCHLORIDE 100 MCG: 10 INJECTION INTRAVENOUS at 14:07

## 2020-12-17 RX ADMIN — Medication 5 MG: at 15:09

## 2020-12-17 RX ADMIN — PROPOFOL 200 MG: 10 INJECTION, EMULSION INTRAVENOUS at 13:05

## 2020-12-17 RX ADMIN — VECURONIUM BROMIDE 1 MG: 1 INJECTION, POWDER, LYOPHILIZED, FOR SOLUTION INTRAVENOUS at 13:59

## 2020-12-17 RX ADMIN — HYDROMORPHONE HYDROCHLORIDE 0.5 MG: 2 INJECTION, SOLUTION INTRAMUSCULAR; INTRAVENOUS; SUBCUTANEOUS at 15:56

## 2020-12-17 RX ADMIN — SUGAMMADEX 100 MG: 100 INJECTION, SOLUTION INTRAVENOUS at 15:51

## 2020-12-17 RX ADMIN — OXYCODONE HYDROCHLORIDE 5 MG: 5 TABLET ORAL at 18:38

## 2020-12-17 RX ADMIN — VECURONIUM BROMIDE 1 MG: 1 INJECTION, POWDER, LYOPHILIZED, FOR SOLUTION INTRAVENOUS at 14:50

## 2020-12-17 RX ADMIN — SODIUM CHLORIDE, POTASSIUM CHLORIDE, SODIUM LACTATE AND CALCIUM CHLORIDE: 600; 310; 30; 20 INJECTION, SOLUTION INTRAVENOUS at 12:53

## 2020-12-17 RX ADMIN — PHENYLEPHRINE HYDROCHLORIDE 100 MCG: 10 INJECTION INTRAVENOUS at 14:02

## 2020-12-17 RX ADMIN — Medication 5 MG: at 15:17

## 2020-12-17 RX ADMIN — VECURONIUM BROMIDE 1 MG: 1 INJECTION, POWDER, LYOPHILIZED, FOR SOLUTION INTRAVENOUS at 14:21

## 2020-12-17 RX ADMIN — KETAMINE HYDROCHLORIDE 10 MG: 10 INJECTION, SOLUTION INTRAMUSCULAR; INTRAVENOUS at 13:13

## 2020-12-17 RX ADMIN — CEFAZOLIN SODIUM 2 G: 10 INJECTION, POWDER, FOR SOLUTION INTRAVENOUS at 12:55

## 2020-12-17 RX ADMIN — ACETAMINOPHEN 500 MG: 500 TABLET, FILM COATED ORAL at 20:48

## 2020-12-17 RX ADMIN — HYDROMORPHONE HYDROCHLORIDE 0.5 MG: 2 INJECTION, SOLUTION INTRAMUSCULAR; INTRAVENOUS; SUBCUTANEOUS at 15:26

## 2020-12-17 RX ADMIN — HYDROMORPHONE HYDROCHLORIDE 0.5 MG: 2 INJECTION, SOLUTION INTRAMUSCULAR; INTRAVENOUS; SUBCUTANEOUS at 15:06

## 2020-12-17 RX ADMIN — OXYCODONE HYDROCHLORIDE 5 MG: 5 TABLET ORAL at 22:29

## 2020-12-17 RX ADMIN — PHENYLEPHRINE HYDROCHLORIDE 50 MCG: 10 INJECTION INTRAVENOUS at 15:20

## 2020-12-17 RX ADMIN — KETAMINE HYDROCHLORIDE 10 MG: 10 INJECTION, SOLUTION INTRAMUSCULAR; INTRAVENOUS at 15:15

## 2020-12-17 RX ADMIN — METHOCARBAMOL 500 MG: 100 INJECTION INTRAMUSCULAR; INTRAVENOUS at 15:51

## 2020-12-17 RX ADMIN — DEXTROSE AND SODIUM CHLORIDE: 5; 450 INJECTION, SOLUTION INTRAVENOUS at 16:44

## 2020-12-17 RX ADMIN — Medication 10 ML: at 20:37

## 2020-12-17 RX ADMIN — Medication 5 MG: at 15:23

## 2020-12-17 RX ADMIN — MIDAZOLAM 2 MG: 1 INJECTION INTRAMUSCULAR; INTRAVENOUS at 12:55

## 2020-12-17 RX ADMIN — Medication 10 MG: at 13:28

## 2020-12-17 RX ADMIN — MULTIPLE VITAMINS W/ MINERALS TAB 1 TABLET: TAB at 20:45

## 2020-12-17 RX ADMIN — SODIUM CHLORIDE, POTASSIUM CHLORIDE, SODIUM LACTATE AND CALCIUM CHLORIDE: 600; 310; 30; 20 INJECTION, SOLUTION INTRAVENOUS at 14:55

## 2020-12-17 RX ADMIN — FENTANYL CITRATE 25 MCG: 50 INJECTION INTRAMUSCULAR; INTRAVENOUS at 13:56

## 2020-12-17 RX ADMIN — HYDROMORPHONE HYDROCHLORIDE 0.5 MG: 2 INJECTION, SOLUTION INTRAMUSCULAR; INTRAVENOUS; SUBCUTANEOUS at 16:02

## 2020-12-17 RX ADMIN — Medication 5 MG: at 14:38

## 2020-12-17 RX ADMIN — Medication 0.2 MG: at 13:25

## 2020-12-17 RX ADMIN — HYDROMORPHONE HYDROCHLORIDE 0.5 MG: 2 INJECTION, SOLUTION INTRAMUSCULAR; INTRAVENOUS; SUBCUTANEOUS at 17:12

## 2020-12-17 RX ADMIN — LIDOCAINE HYDROCHLORIDE 3 ML: 40 SOLUTION TOPICAL at 13:05

## 2020-12-17 RX ADMIN — VECURONIUM BROMIDE 2 MG: 1 INJECTION, POWDER, LYOPHILIZED, FOR SOLUTION INTRAVENOUS at 13:29

## 2020-12-17 RX ADMIN — FENTANYL CITRATE 50 MCG: 50 INJECTION INTRAMUSCULAR; INTRAVENOUS at 13:03

## 2020-12-17 RX ADMIN — SUGAMMADEX 100 MG: 100 INJECTION, SOLUTION INTRAVENOUS at 15:46

## 2020-12-17 RX ADMIN — LIDOCAINE HYDROCHLORIDE 60 MG: 20 INJECTION, SOLUTION EPIDURAL; INFILTRATION; INTRACAUDAL; PERINEURAL at 13:05

## 2020-12-17 RX ADMIN — DEXMEDETOMIDINE HYDROCHLORIDE 10 MCG: 100 INJECTION, SOLUTION INTRAVENOUS at 13:30

## 2020-12-17 RX ADMIN — STANDARDIZED SENNA CONCENTRATE AND DOCUSATE SODIUM 1 TABLET: 8.6; 5 TABLET ORAL at 20:36

## 2020-12-17 RX ADMIN — Medication 5 MG: at 14:50

## 2020-12-17 RX ADMIN — PHENYLEPHRINE HYDROCHLORIDE 100 MCG: 10 INJECTION INTRAVENOUS at 13:25

## 2020-12-17 RX ADMIN — SCOPALAMINE 1 PATCH: 1 PATCH, EXTENDED RELEASE TRANSDERMAL at 12:46

## 2020-12-17 RX ADMIN — DEXAMETHASONE SODIUM PHOSPHATE 10 MG: 4 INJECTION, SOLUTION INTRAMUSCULAR; INTRAVENOUS at 13:13

## 2020-12-17 RX ADMIN — Medication 5 MG: at 14:21

## 2020-12-17 RX ADMIN — KETOROLAC TROMETHAMINE 30 MG: 30 INJECTION, SOLUTION INTRAMUSCULAR at 16:44

## 2020-12-17 RX ADMIN — PHENYLEPHRINE HYDROCHLORIDE 100 MCG: 10 INJECTION INTRAVENOUS at 13:32

## 2020-12-17 RX ADMIN — PHENYLEPHRINE HYDROCHLORIDE 100 MCG: 10 INJECTION INTRAVENOUS at 14:24

## 2020-12-17 RX ADMIN — DEXMEDETOMIDINE HYDROCHLORIDE 5 MCG: 100 INJECTION, SOLUTION INTRAVENOUS at 15:26

## 2020-12-17 RX ADMIN — APREPITANT 40 MG: 40 CAPSULE ORAL at 12:46

## 2020-12-17 RX ADMIN — VECURONIUM BROMIDE 6 MG: 1 INJECTION, POWDER, LYOPHILIZED, FOR SOLUTION INTRAVENOUS at 13:05

## 2020-12-17 RX ADMIN — HYDROMORPHONE HYDROCHLORIDE 0.5 MG: 2 INJECTION, SOLUTION INTRAMUSCULAR; INTRAVENOUS; SUBCUTANEOUS at 17:47

## 2020-12-17 ASSESSMENT — PULMONARY FUNCTION TESTS
PIF_VALUE: 24
PIF_VALUE: 23
PIF_VALUE: 14
PIF_VALUE: 24
PIF_VALUE: 23
PIF_VALUE: 23
PIF_VALUE: 16
PIF_VALUE: 23
PIF_VALUE: 24
PIF_VALUE: 24
PIF_VALUE: 25
PIF_VALUE: 23
PIF_VALUE: 26
PIF_VALUE: 14
PIF_VALUE: 23
PIF_VALUE: 14
PIF_VALUE: 1
PIF_VALUE: 25
PIF_VALUE: 24
PIF_VALUE: 24
PIF_VALUE: 11
PIF_VALUE: 2
PIF_VALUE: 23
PIF_VALUE: 24
PIF_VALUE: 27
PIF_VALUE: 23
PIF_VALUE: 23
PIF_VALUE: 16
PIF_VALUE: 25
PIF_VALUE: 24
PIF_VALUE: 23
PIF_VALUE: 1
PIF_VALUE: 15
PIF_VALUE: 24
PIF_VALUE: 14
PIF_VALUE: 23
PIF_VALUE: 23
PIF_VALUE: 24
PIF_VALUE: 14
PIF_VALUE: 24
PIF_VALUE: 17
PIF_VALUE: 24
PIF_VALUE: 23
PIF_VALUE: 17
PIF_VALUE: 22
PIF_VALUE: 23
PIF_VALUE: 15
PIF_VALUE: 17
PIF_VALUE: 24
PIF_VALUE: 23
PIF_VALUE: 24
PIF_VALUE: 15
PIF_VALUE: 14
PIF_VALUE: 24
PIF_VALUE: 23
PIF_VALUE: 23
PIF_VALUE: 2
PIF_VALUE: 23
PIF_VALUE: 16
PIF_VALUE: 14
PIF_VALUE: 24
PIF_VALUE: 23
PIF_VALUE: 25
PIF_VALUE: 24
PIF_VALUE: 14
PIF_VALUE: 24
PIF_VALUE: 24
PIF_VALUE: 25
PIF_VALUE: 15
PIF_VALUE: 17
PIF_VALUE: 22
PIF_VALUE: 23
PIF_VALUE: 24
PIF_VALUE: 25
PIF_VALUE: 24
PIF_VALUE: 23
PIF_VALUE: 24
PIF_VALUE: 1
PIF_VALUE: 17
PIF_VALUE: 23
PIF_VALUE: 23
PIF_VALUE: 14
PIF_VALUE: 24
PIF_VALUE: 0
PIF_VALUE: 1
PIF_VALUE: 23
PIF_VALUE: 25
PIF_VALUE: 15
PIF_VALUE: 23
PIF_VALUE: 23
PIF_VALUE: 25
PIF_VALUE: 24
PIF_VALUE: 24
PIF_VALUE: 23
PIF_VALUE: 15
PIF_VALUE: 23
PIF_VALUE: 23
PIF_VALUE: 14
PIF_VALUE: 18
PIF_VALUE: 24
PIF_VALUE: 24
PIF_VALUE: 23
PIF_VALUE: 24
PIF_VALUE: 23
PIF_VALUE: 22
PIF_VALUE: 5
PIF_VALUE: 24
PIF_VALUE: 14
PIF_VALUE: 22
PIF_VALUE: 23
PIF_VALUE: 23
PIF_VALUE: 17
PIF_VALUE: 14
PIF_VALUE: 23
PIF_VALUE: 24
PIF_VALUE: 14
PIF_VALUE: 15
PIF_VALUE: 23
PIF_VALUE: 25
PIF_VALUE: 23
PIF_VALUE: 15
PIF_VALUE: 24
PIF_VALUE: 25
PIF_VALUE: 20
PIF_VALUE: 24
PIF_VALUE: 24
PIF_VALUE: 16
PIF_VALUE: 15
PIF_VALUE: 15
PIF_VALUE: 17
PIF_VALUE: 15
PIF_VALUE: 23
PIF_VALUE: 23
PIF_VALUE: 24
PIF_VALUE: 0
PIF_VALUE: 23
PIF_VALUE: 14
PIF_VALUE: 16
PIF_VALUE: 24
PIF_VALUE: 23
PIF_VALUE: 24
PIF_VALUE: 25
PIF_VALUE: 23
PIF_VALUE: 18
PIF_VALUE: 23
PIF_VALUE: 24
PIF_VALUE: 16
PIF_VALUE: 16
PIF_VALUE: 23
PIF_VALUE: 23
PIF_VALUE: 17
PIF_VALUE: 17
PIF_VALUE: 14
PIF_VALUE: 24
PIF_VALUE: 23
PIF_VALUE: 15
PIF_VALUE: 15
PIF_VALUE: 22
PIF_VALUE: 24
PIF_VALUE: 23
PIF_VALUE: 24
PIF_VALUE: 24
PIF_VALUE: 17
PIF_VALUE: 24
PIF_VALUE: 23
PIF_VALUE: 22
PIF_VALUE: 24
PIF_VALUE: 15
PIF_VALUE: 1
PIF_VALUE: 23
PIF_VALUE: 24
PIF_VALUE: 22

## 2020-12-17 ASSESSMENT — PAIN DESCRIPTION - LOCATION
LOCATION: ABDOMEN
LOCATION: ABDOMEN

## 2020-12-17 ASSESSMENT — PAIN SCALES - GENERAL
PAINLEVEL_OUTOF10: 8
PAINLEVEL_OUTOF10: 8
PAINLEVEL_OUTOF10: 5
PAINLEVEL_OUTOF10: 7
PAINLEVEL_OUTOF10: 7
PAINLEVEL_OUTOF10: 5

## 2020-12-17 ASSESSMENT — PAIN DESCRIPTION - PAIN TYPE
TYPE: SURGICAL PAIN
TYPE: SURGICAL PAIN

## 2020-12-17 ASSESSMENT — PAIN - FUNCTIONAL ASSESSMENT: PAIN_FUNCTIONAL_ASSESSMENT: 0-10

## 2020-12-17 ASSESSMENT — LIFESTYLE VARIABLES: SMOKING_STATUS: 1

## 2020-12-17 NOTE — ANESTHESIA PRE PROCEDURE
Department of Anesthesiology  Preprocedure Note       Name:  Jose Miller   Age:  45 y.o.  :  1975                                          MRN:  [id number]         Date:  2020      Surgeon: Alejandra Del Castillo):  Thelma Mccormack MD    Procedure: Procedure(s):  ROBOTIC LAPAROSCOPIC PARTIAL LEFT NEPHRECTOMY    Medications prior to admission:   Prior to Admission medications    Medication Sig Start Date End Date Taking?  Authorizing Provider   Docusate Sodium (COLACE PO) Take by mouth daily   Yes Historical Provider, MD   acetaminophen (TYLENOL) 500 MG tablet Take 1,000 mg by mouth every 6 hours as needed for Pain   Yes Historical Provider, MD   Multiple Vitamins-Minerals (THERAPEUTIC MULTIVITAMIN-MINERALS) tablet Take 1 tablet by mouth daily   Yes Historical Provider, MD   Nutritional Supplements (ESTROVEN PO) Take by mouth   Yes Historical Provider, MD   BLACK ELDERBERRY PO Take by mouth   Yes Historical Provider, MD   Cinnamon 500 MG CAPS Take by mouth   Yes Historical Provider, MD   promethazine (PHENERGAN) 25 MG tablet Take 25 mg by mouth 4 times daily as needed for Nausea   Yes Historical Provider, MD   meclizine (ANTIVERT) 25 MG tablet Take 1 tablet by mouth 3 times daily as needed for Dizziness 19  Yes Simeon Dean MD   naproxen sodium (ALEVE) 220 MG tablet Take 2 tablets by mouth 2 times daily as needed    Yes Historical Provider, MD       Current medications:    Current Facility-Administered Medications   Medication Dose Route Frequency Provider Last Rate Last Admin    lactated ringers infusion   Intravenous Continuous Thelma Mccormack MD        lidocaine PF 1 % injection 0.5 mL  0.5 mL Intradermal Once Thelma Mccormack MD        ceFAZolin (ANCEF) 2 g in dextrose 5 % 100 mL IVPB  2 g Intravenous On Call to [address] [place] [address] 1788, MD        CEFAZOLIN 2 G D5W 100 ML IVPB IVPB  HYDROcodone-acetaminophen (NORCO) 5-325 MG per tablet 1 tablet  1 tablet Oral Once PRN Guero Awad MD        ondansetron [facility]) injection 4 mg  4 mg Intravenous Once PRN Guero Awad MD        HYDROmorphone (DILAUDID) injection 0.25 mg  0.25 mg Intravenous Q5 Min PRN Guero Awad MD        HYDROmorphone (DILAUDID) injection 0.5 mg  0.5 mg Intravenous Q5 Min PRN Guero Awad MD           Allergies:  No Known Allergies    Problem List:    Patient Active Problem List   Diagnosis Code    H/O myringotomy Z98.890    Tobacco use disorder F17.200    Sleep apnea syndrome G47.30    Hemorrhoids K64.9    Family history of premature CAD Z82.49    Adnexal mass N94.89    Intractable migraine without aura and with status migrainosus G43.011    Pure hypercholesterolemia E78.00    Acute cholecystitis K81.0    Acute calculous cholecystitis K80.00    Fatty liver K76.0       Past Medical History:        Diagnosis Date    Gastroparesis     GERD (gastroesophageal reflux disease)     H/O myringotomy     w/tube placement     Sleep apnea syndrome     no c-pap    Tobacco use disorder        Past Surgical History:        Procedure Laterality Date    ADENOIDECTOMY      CHOLECYSTECTOMY, LAPAROSCOPIC N/A 11/3/2020    LAPAROSCOPIC CHOLECYSTECTOMY performed by Jillian Soares MD at [facility] COLONOSCOPY N/A 7/9/2020    COLONOSCOPY POLYPECTOMY SNARE/COLD BIOPSY performed by Aurea Sun MD at [place] [address]  7/9/2020    COLONOSCOPY WITH BIOPSY performed by Aurea Sun MD at [facility]  11/30/11    DILATION AND CURETTAGE OF UTERUS  5/24/12    HYSTERECTOMY, TOTAL ABDOMINAL  04/05/2018    total hysterectomy, BSO    TYMPANOSTOMY TUBE PLACEMENT      UPPER GASTROINTESTINAL ENDOSCOPY N/A 7/9/2020    EGD BIOPSY performed by Aurea Sun MD at [address] [facility] History:    Social History     Tobacco Use  Smoking status: Current Every Day Smoker     Packs/day: 0.50     Years: 14.00     Pack years: 7.00     Types: Cigarettes    Smokeless tobacco: Never Used   Substance Use Topics    Alcohol use: Not Currently                                Ready to quit: No  Counseling given: Yes      Vital Signs (Current):   Vitals:    12/08/20 1512 12/17/20 1046   BP:  103/69   Pulse:  80   Resp:  18   Temp:  97.5 °F (36.4 °C)   TempSrc:  Temporal   SpO2:  99%   Weight: 170 lb (77.1 kg) 170 lb 9.6 oz (77.4 kg)   Height: 4' 11\" (1.499 m) 4' 11\" (1.499 m)                                              BP Readings from Last 3 Encounters:   12/17/20 103/69   12/01/20 124/80   11/16/20 110/84       NPO Status: Time of last liquid consumption: 0000                        Time of last solid consumption: 0000                        Date of last liquid consumption: 12/17/20                        Date of last solid food consumption: 12/17/20    BMI:   Wt Readings from Last 3 Encounters:   12/17/20 170 lb 9.6 oz (77.4 kg)   12/01/20 176 lb (79.8 kg)   11/16/20 170 lb (77.1 kg)     Body mass index is 34.46 kg/m². CBC:   Lab Results   Component Value Date    WBC 6.6 12/03/2020    RBC 4.25 12/03/2020    HGB 13.1 12/03/2020    HCT 38.4 12/03/2020    MCV 90.5 12/03/2020    RDW 13.8 12/03/2020     12/03/2020       CMP:   Lab Results   Component Value Date     12/03/2020    K 4.6 12/03/2020    K 3.8 11/04/2020     12/03/2020    CO2 28 12/03/2020    BUN 11 12/03/2020    CREATININE <0.5 12/03/2020    GFRAA >60 12/03/2020    GFRAA >60 11/30/2011    AGRATIO 1.7 12/03/2020    LABGLOM >60 12/03/2020    GLUCOSE 89 12/03/2020    PROT 7.1 12/03/2020    CALCIUM 10.1 12/03/2020    BILITOT 0.5 12/03/2020    ALKPHOS 103 12/03/2020    AST 14 12/03/2020    ALT 17 12/03/2020       POC Tests: No results for input(s): POCGLU, POCNA, POCK, POCCL, POCBUN, POCHEMO, POCHCT in the last 72 hours.     Coags:   Lab Results   Component Value Date PROTIME 12.3 12/03/2020    INR 1.06 12/03/2020    APTT 34.7 12/03/2020       HCG (If Applicable):   Lab Results   Component Value Date    PREGTESTUR Negative 04/05/2018        ABGs: No results found for: PHART, PO2ART, RGL9RHW, CRX5YAR, BEART, A8TDALAC     Type & Screen (If Applicable):  Lab Results   Component Value Date    LABABO A 04/27/2012    LABRH Positive 04/27/2012       Drug/Infectious Status (If Applicable):  No results found for: HIV, HEPCAB    COVID-19 Screening (If Applicable):   Lab Results   Component Value Date    COVID19 Not Detected 07/02/2020         Anesthesia Evaluation  Patient summary reviewed no history of anesthetic complications:   Airway: Mallampati: II  TM distance: >3 FB   Neck ROM: full  Mouth opening: > = 3 FB Dental: normal exam         Pulmonary: breath sounds clear to auscultation  (+) sleep apnea (no longer thinks she has symptoms since weight loss):  current smoker                           Cardiovascular:        (-) CABG/stent, dysrhythmias and  angina      Rhythm: regular  Rate: normal                    Neuro/Psych:   (+) headaches:,    (-) seizures, TIA and CVA           GI/Hepatic/Renal:   (+) liver disease (fatty liver):,      (-) GERD      ROS comment: No longer gets gerd sx since cholecystectomy  Renal mass. Endo/Other:                      ROS comment: Patient states she will be unable to swallow tylenol pre-op with a sip of water. She would need a full glass or more. Abdominal:           Vascular:                                      Anesthesia Plan      general     ASA 2       Induction: intravenous. MIPS: Postoperative opioids intended, Prophylactic antiemetics administered and Postoperative trial extubation. Anesthetic plan and risks discussed with patient. Use of blood products discussed with patient whom consented to blood products. Plan discussed with CRNA.                   Kenyon Mandujano MD   12/17/2020

## 2020-12-17 NOTE — DISCHARGE SUMMARY
Urology Discharge Summary  [facility]    Provider: Christopher Wallace CNP Patient ID:  Admission Date: 2020 Name: Avelino Zaragoza Date: 2020 MRN: [id number]   [place] [facility] [id number] : 1975  Attending: Pj Maciel MD Date of Service: 2020  PCP: Rashi Marcelino MD     Discharge date: 2020    Discharge Diagnoses:   1. Preop testing    2. Renal mass, left        Reason for Hospitalization: Pradeep Jalloh is a 45 y.o. female who was admitted post surgery. Operations/Procedures Performed:   1. Partial Nephrectomy    Discharge Condition: Stable    Hospital Course: The patient was admitted on 2020 and underwent the above mentioned procedure(s). She tolerated the procedure well with no apparent complications. Please see full operative report for further details regarding the operation. Postoperatively the patient remained in stable condition. Pain was controlled post-operatively with oral and IV medication. Diet was advanced and this was tolerated well. At the time of discharge, the patient was tolerating oral food and hydration, was ambulating without difficulty, and pain was controlled on oral medications. The patient was determined to be suitable for discharge and the patient felt comfortable with that decision. Patient was discharged in good condition. Separate discharge instructions provided to the patient or their caregiver. Consults: None     Significant Diagnostic findings: See operative reports    Discharge Exam:  Blood pressure 116/75, pulse 95, temperature 98.3 °F (36.8 °C), temperature source Oral, resp. rate 14, height 4' 11\" (1.499 m), weight 170 lb 9.6 oz (77.4 kg), last menstrual period 2018, SpO2 95 %, unknown if currently breastfeeding.   Gen - NAD, AOx3  CV - RRR  Resp - CTAB  Abd - soft, NT/ND, inc c/d/i  Ext - warm, well-perfused    See progress note exam on day of discharge    Disposition: Discharged home in good condition    Discharge Medications:  Current Discharge Medication List      START taking these medications    Details   HYDROcodone-acetaminophen (NORCO) 5-325 MG per tablet Take 1 tablet by mouth every 6 hours as needed for Pain for up to 5 days. Intended supply: 5 days. Take lowest dose possible to manage pain. Qty: 20 tablet, Refills: 0    Comments: Reduce doses taken as pain becomes manageable  Associated Diagnoses: Renal mass, left      senna-docusate (PERICOLACE) 8.6-50 MG per tablet Take 1 tablet by mouth 2 times daily For constipation while on pain medication. Qty: 30 tablet, Refills: 1         CONTINUE these medications which have NOT CHANGED    Details   Docusate Sodium (COLACE PO) Take by mouth daily      acetaminophen (TYLENOL) 500 MG tablet Take 1,000 mg by mouth every 6 hours as needed for Pain      Multiple Vitamins-Minerals (THERAPEUTIC MULTIVITAMIN-MINERALS) tablet Take 1 tablet by mouth daily      Nutritional Supplements (ESTROVEN PO) Take by mouth      BLACK ELDERBERRY PO Take by mouth      Cinnamon 500 MG CAPS Take by mouth      promethazine (PHENERGAN) 25 MG tablet Take 25 mg by mouth 4 times daily as needed for Nausea      meclizine (ANTIVERT) 25 MG tablet Take 1 tablet by mouth 3 times daily as needed for Dizziness  Qty: 60 tablet, Refills: 3      naproxen sodium (ALEVE) 220 MG tablet Take 2 tablets by mouth 2 times daily as needed              Follow up Appointment:  Please call [phone] to schedule a follow up appointment with Dr. Lesley Mendes in 4-6 weeks. Total time spent reviewing discharge plan with patient/caregivers, preparation of the discharge record, filling prescriptions, and arranging post discharge plan was 15-20 minutes.     Dale Reed, YOSEF  12/18/2020

## 2020-12-17 NOTE — OP NOTE
Urology Operative Report  [facility]    Provider: Jerad Piedra MD Patient ID:  Admission Date: 2020 Name: Marcos Manuel Date: 2020  MRN: [id number]   Patient Location: OR/NONE : 1975  Attending: Jerad Piedra MD Date of Service: 2020  PCP: Shauna Salas MD     Date of Operation: 2020    Preoperative Diagnosis: renal mass on the LEFT centimeter left lower pole concerning for renal cell carcinoma    Postoperative Diagnosis: same    Procedure:    1. Robotic assisted laparoscopic partial nephrectomy on the LEFT  2. Intra-operative renal ultrasound, drop in probe, with doppler flow of renal vascular anatomy    Surgeon:   Jerad Piedra MD    Anesthesia: General endotracheal anesthesia    Indications: Kateryna Chakraborty is a 45 y.o. female who presents for the above named surgery. Informed consent was obtained and the risks, benefits, and details of the procedure were explained to the patient who elected to proceed. Details of Procedure:  After informed consent was obtained, making the patient aware of the risks, benefits and alternatives to the procedure, she was taken back to the surgical suite and positioned in a supine position on the surgical table. SCDs were placed on the lower extremities. General anesthesia was induced, a Noriega catheter was placed into the bladder and she was transferred to a right lateral decubitus position. All pressure points were carefully padded. A 12mm incision was made in the left side paramedian location, superior and lateral to the umbilicus. A veress needle was introduced into the abdomen in one attempt(s) and insufflation was obtained. A 8 mm trocar was used to enter the abdomen. No adhesions were seen and no injury was noted. After inspecting the abdomen sufficiently we placed 2 robotic 8 mm trocars in the left lower quadrant and one 8mm trocar in the left upper quadrant, triangulated towards the renal hilum.  An assistant 12mm air seal port was placed in the midline kelsey-umbilically. The robot was then docked and the instruments advanced under direct vision. The descending colon was reflected medially by incising the white line of Toldt. The spleen was reflected cephalad allowing exposure to the kidney in the usual retroperitoneal location. The gonadal vein was identified medially and traced cephalad until the renal vein was encountered. By using the and gonadal vein as landmarks a plane was created directly above the psoas muscle, reflecting the ureter with the kidney. This plane posterior to the kidney on top of the psoas muscle was extended cephalad. The renal hilar vessels were encountered medial to the kidney. The artery and vein were dissected free of investing tissues. Attention was turned to the kidney to identify the tumor. After incising the overlying fat the tumor was able to be appreciated as expected based on pre-op imaging. The fat layer was kept in place as a cap of fat on the tumor itself. The laparoscopic ultrasound probe was used to identify the margins of the tumor and nehal the kidney with cautery. Sutures of 0-V-Loc was prepared. The renal artery was clamped and the ultrasound with color doppler flow was used to ensure adequate arterial clamping had been obtained. The tumor was excised sharply placed to the side. The base of the wound was cauterized using monopolar energy. The renal artery clamp was carefully removed. There was excellent hemostasis. 0-strata fix was used as a running, functionally interrupted, horizontal mattress with lapra-clips on the parenchyma. Hemostasis appeared adequate. All needles were confirmed out. The specimen was placed in a bag. The kidney was replaced within the retroperitoneum using a running V-Loc suture. Robotic instruments were removed. The assistant port was used to allow extraction of the specimen.  This incision was closed directly using 0 Ethibond in a interrupted fashion. All wounds were irrigated and skin was closed using 4-0 Monocryl and Dermabond. At the end of the procedure all needle, lap, instrument and sponge counts were correct. The patient tolerated the procedure well, was extubated without issue in the operating room, and was transported to the PACU in stable condition. Findings: There did appear to be an adequate surgical margin. Total clamp time 8 minutes. Estimated Blood Loss: Approximately 5 mL                  Drains: Noriega catheter    Specimens: Left partial kidney, with overlying fat. Complications: none apparent           Disposition:  PACU - hemodynamically stable.      Plan: Admission overnight, follow-up pathology           Erica Guardado MD  12/17/2020

## 2020-12-17 NOTE — ANESTHESIA POSTPROCEDURE EVALUATION
Department of Anesthesiology  Postprocedure Note    Patient: Mary Muñoz  MRN: [id number]  YOB: 1975  Date of evaluation: 12/17/2020  Time:  4:35 PM     Procedure Summary     Date: 12/17/20 Room / Location: [address]    Anesthesia Start: 5437 Anesthesia Stop: 7211    Procedure: ROBOTIC LAPAROSCOPIC PARTIAL LEFT NEPHRECTOMY (Left Abdomen) Diagnosis: (D41.02  LEFT RENAL MASS)    Surgeons: Conchita Norris MD Responsible Provider: Hillary Parr MD    Anesthesia Type: general ASA Status: 2          Anesthesia Type: general    Ksenia Phase I: Ksenia Score: 8    Ksenia Phase II:      Last vitals: Reviewed and per EMR flowsheets.        Anesthesia Post Evaluation    Patient location during evaluation: PACU  Patient participation: complete - patient participated  Level of consciousness: awake and alert  Pain score: 2  Airway patency: patent  Nausea & Vomiting: no vomiting  Complications: no  Cardiovascular status: blood pressure returned to baseline  Respiratory status: acceptable  Hydration status: euvolemic

## 2020-12-17 NOTE — PROGRESS NOTES
Patient/report received from OR to PACU in stable condition.   Vss, drsg to lap incisions c/d/i, abdomen, soft, non-tender, will monitor

## 2020-12-17 NOTE — H&P
Urology Preoperative History & Physical - Update with Site [facility]    The history and physical was reviewed. The patient was seen and examined in pre-op and her LEFT side was marked with her participation. Exam-NAD, heart (normal rate) and lungs (nonlabored breathing) were both normal.  exam from clinic reviewed, today's  exam deferred to surgery. She had a chance to ask questions which were answered. There has been no interval change. Plan to continue to the OR for LEFT partial nephrectomy, robotic, possible open possible radical Nx.     Electronically signed by: Hannah Brice MD, OSCAR 12/17/2020   [facility]  Office Contact: [phone]

## 2020-12-18 VITALS
BODY MASS INDEX: 34.39 KG/M2 | HEIGHT: 59 IN | DIASTOLIC BLOOD PRESSURE: 75 MMHG | SYSTOLIC BLOOD PRESSURE: 116 MMHG | WEIGHT: 170.6 LBS | RESPIRATION RATE: 14 BRPM | OXYGEN SATURATION: 95 % | TEMPERATURE: 98.3 F | HEART RATE: 95 BPM

## 2020-12-18 LAB
ANION GAP SERPL CALCULATED.3IONS-SCNC: 10 MMOL/L (ref 3–16)
BUN BLDV-MCNC: 7 MG/DL (ref 7–20)
CALCIUM SERPL-MCNC: 9.2 MG/DL (ref 8.3–10.6)
CHLORIDE BLD-SCNC: 105 MMOL/L (ref 99–110)
CO2: 25 MMOL/L (ref 21–32)
CREAT SERPL-MCNC: <0.5 MG/DL (ref 0.6–1.1)
GFR AFRICAN AMERICAN: >60
GFR NON-AFRICAN AMERICAN: >60
GLUCOSE BLD-MCNC: 95 MG/DL (ref 70–99)
HCT VFR BLD CALC: 31.5 % (ref 36–48)
HCT VFR BLD CALC: 31.8 % (ref 36–48)
HEMOGLOBIN: 10.7 G/DL (ref 12–16)
HEMOGLOBIN: 10.7 G/DL (ref 12–16)
POTASSIUM SERPL-SCNC: 3.8 MMOL/L (ref 3.5–5.1)
SODIUM BLD-SCNC: 140 MMOL/L (ref 136–145)

## 2020-12-18 PROCEDURE — 6360000002 HC RX W HCPCS: Performed by: UROLOGY

## 2020-12-18 PROCEDURE — 80048 BASIC METABOLIC PNL TOTAL CA: CPT

## 2020-12-18 PROCEDURE — 85014 HEMATOCRIT: CPT

## 2020-12-18 PROCEDURE — 36415 COLL VENOUS BLD VENIPUNCTURE: CPT

## 2020-12-18 PROCEDURE — 85018 HEMOGLOBIN: CPT

## 2020-12-18 PROCEDURE — 2580000003 HC RX 258: Performed by: UROLOGY

## 2020-12-18 PROCEDURE — G0378 HOSPITAL OBSERVATION PER HR: HCPCS

## 2020-12-18 PROCEDURE — 6370000000 HC RX 637 (ALT 250 FOR IP): Performed by: UROLOGY

## 2020-12-18 PROCEDURE — 51798 US URINE CAPACITY MEASURE: CPT | Performed by: NURSE PRACTITIONER

## 2020-12-18 RX ADMIN — OXYCODONE HYDROCHLORIDE 5 MG: 5 TABLET ORAL at 03:21

## 2020-12-18 RX ADMIN — MULTIPLE VITAMINS W/ MINERALS TAB 1 TABLET: TAB at 07:48

## 2020-12-18 RX ADMIN — HYDROMORPHONE HYDROCHLORIDE 0.25 MG: 1 INJECTION, SOLUTION INTRAMUSCULAR; INTRAVENOUS; SUBCUTANEOUS at 12:08

## 2020-12-18 RX ADMIN — DEXTROSE AND SODIUM CHLORIDE: 5; 450 INJECTION, SOLUTION INTRAVENOUS at 08:02

## 2020-12-18 RX ADMIN — ACETAMINOPHEN 500 MG: 500 TABLET, FILM COATED ORAL at 07:48

## 2020-12-18 RX ADMIN — ACETAMINOPHEN 500 MG: 500 TABLET, FILM COATED ORAL at 03:21

## 2020-12-18 RX ADMIN — OXYCODONE HYDROCHLORIDE 5 MG: 5 TABLET ORAL at 10:08

## 2020-12-18 RX ADMIN — STANDARDIZED SENNA CONCENTRATE AND DOCUSATE SODIUM 1 TABLET: 8.6; 5 TABLET ORAL at 07:48

## 2020-12-18 RX ADMIN — HYDROMORPHONE HYDROCHLORIDE 0.25 MG: 1 INJECTION, SOLUTION INTRAMUSCULAR; INTRAVENOUS; SUBCUTANEOUS at 07:56

## 2020-12-18 RX ADMIN — ACETAMINOPHEN 500 MG: 500 TABLET, FILM COATED ORAL at 14:07

## 2020-12-18 RX ADMIN — OXYCODONE HYDROCHLORIDE 5 MG: 5 TABLET ORAL at 14:07

## 2020-12-18 ASSESSMENT — PAIN SCALES - GENERAL
PAINLEVEL_OUTOF10: 8
PAINLEVEL_OUTOF10: 7
PAINLEVEL_OUTOF10: 7
PAINLEVEL_OUTOF10: 8
PAINLEVEL_OUTOF10: 7
PAINLEVEL_OUTOF10: 8
PAINLEVEL_OUTOF10: 6
PAINLEVEL_OUTOF10: 7
PAINLEVEL_OUTOF10: 7
PAINLEVEL_OUTOF10: 8

## 2020-12-18 ASSESSMENT — PAIN DESCRIPTION - DESCRIPTORS
DESCRIPTORS: SHOOTING

## 2020-12-18 ASSESSMENT — PAIN DESCRIPTION - PAIN TYPE
TYPE: ACUTE PAIN

## 2020-12-18 ASSESSMENT — PAIN DESCRIPTION - LOCATION
LOCATION: NECK

## 2020-12-18 NOTE — PROGRESS NOTES
8:13 AM  Morning assessment complete and am meds given. Patient tearful in pain. She c/o some shoulder pain, but lots of pain in the back of her neck. This is new since surgery. She denies any numbness or tingling in her hands or fingers. Heating pad ordered. Patient medicated for pain. Restarted IVF. BP on the low side this morning. Ice packs refilled. The care plan and education has been reviewed and mutually agreed upon with the patient. 10:18 AM  BP improving. Patient ambulating in the hallway. Still with lots of pain in neck and shoulder area. Patient says much more when sitting up straight. She sat in her bed and says she started feeling numbness in tingling in the left arm. Notified NP with urology. Pain pill given. 11:26 AM  Bladder scanned patient post void. Only 53cc of residual.     5:45 PM  Surgery in to see patient. [id number] Jigna Bautista for discharge today. Patient is agreeable. Scripts filled and delivered by pharmacy. IV removed without complications. Reviewed discharge instructions with patient. Patient verbalized understanding and denies any questions. Patient discharged to home with all belongings.

## 2020-12-18 NOTE — PROGRESS NOTES
CLINICAL PHARMACY NOTE: MEDS TO [address] Select Patient?: No  Total # of Prescriptions Filled: 2   The following medications were delivered to the patient:  · Norco 5-325mg  · Senexon-S 8.6-50mg  Total # of Interventions Completed: 0  Time Spent (min): 15    Additional Documentation:    Delivered to Patient  Tommy Kennedy CPhT

## 2020-12-18 NOTE — PLAN OF CARE
Problem: Pain:  Goal: Pain level will decrease  Description: Pain level will decrease  12/18/2020 0824 by Cheryle Tejada RN  Outcome: Ongoing  Goal: Control of acute pain  Description: Control of acute pain  12/18/2020 0824 by Cheryle Tejada RN  Outcome: Ongoing  Goal: Control of chronic pain  Description: Control of chronic pain  12/18/2020 0824 by Cheryle Tejada RN  Outcome: Ongoing     Problem: Falls - Risk of:  Goal: Will remain free from falls  Description: Will remain free from falls  12/18/2020 0824 by Cheryle Tejada RN  Outcome: Ongoing  Goal: Absence of physical injury  Description: Absence of physical injury  12/18/2020 0824 by Cheryle Tejada RN  Outcome: Ongoing

## 2020-12-18 NOTE — PROGRESS NOTES
7:02 PM  Patient up from PACU. Vitals are stable. Belly soft and incisions intact. Oriented patient to room and call light.

## 2020-12-18 NOTE — PROGRESS NOTES
PM assessment completed. Pt. A&O x4. VSS at this time. IV fluids infusing. Pt. Requested a pitcher of water and some able juice. Denies nausea. Does report abd pain. Incisions to abd CDI and well approximated. Pt. Ate in PACU, bowel sounds x4 quadrants. Reviewed POC & evening meds. Given per order, see MAR. Noriega to gravity. Emptied 1000 mL phoebe, clear urine. Will continue to monitor output every 2 hours. No other immediate needs. Will continue to monitor. The care plan and education has been reviewed and mutually agreed upon with the patient.

## 2020-12-19 PROCEDURE — 96376 TX/PRO/DX INJ SAME DRUG ADON: CPT

## 2020-12-19 PROCEDURE — 96374 THER/PROPH/DIAG INJ IV PUSH: CPT

## 2020-12-22 LAB
AFB CULTURE (MYCOBACTERIA): NORMAL
AFB SMEAR: NORMAL

## 2021-02-04 ENCOUNTER — OFFICE VISIT (OUTPATIENT)
Dept: FAMILY MEDICINE CLINIC | Age: 46
End: 2021-02-04
Payer: COMMERCIAL

## 2021-02-04 VITALS
SYSTOLIC BLOOD PRESSURE: 110 MMHG | WEIGHT: 176.8 LBS | DIASTOLIC BLOOD PRESSURE: 66 MMHG | TEMPERATURE: 96.9 F | OXYGEN SATURATION: 99 % | BODY MASS INDEX: 35.71 KG/M2 | HEART RATE: 89 BPM

## 2021-02-04 DIAGNOSIS — F17.200 TOBACCO USE DISORDER: ICD-10-CM

## 2021-02-04 DIAGNOSIS — E78.00 PURE HYPERCHOLESTEROLEMIA: ICD-10-CM

## 2021-02-04 DIAGNOSIS — Z85.528 HISTORY OF RENAL CELL CANCER: ICD-10-CM

## 2021-02-04 DIAGNOSIS — F32.5 MAJOR DEPRESSIVE DISORDER WITH SINGLE EPISODE, IN FULL REMISSION (HCC): Primary | ICD-10-CM

## 2021-02-04 DIAGNOSIS — Z12.31 ENCOUNTER FOR SCREENING MAMMOGRAM FOR MALIGNANT NEOPLASM OF BREAST: ICD-10-CM

## 2021-02-04 PROBLEM — K80.00 ACUTE CALCULOUS CHOLECYSTITIS: Status: RESOLVED | Noted: 2020-11-03 | Resolved: 2021-02-04

## 2021-02-04 PROBLEM — N94.89 ADNEXAL MASS: Status: RESOLVED | Noted: 2018-04-05 | Resolved: 2021-02-04

## 2021-02-04 PROBLEM — N28.89 LEFT RENAL MASS: Status: RESOLVED | Noted: 2020-12-17 | Resolved: 2021-02-04

## 2021-02-04 PROBLEM — N28.89 RENAL MASS, LEFT: Status: RESOLVED | Noted: 2020-12-17 | Resolved: 2021-02-04

## 2021-02-04 PROCEDURE — G8484 FLU IMMUNIZE NO ADMIN: HCPCS | Performed by: FAMILY MEDICINE

## 2021-02-04 PROCEDURE — 4004F PT TOBACCO SCREEN RCVD TLK: CPT | Performed by: FAMILY MEDICINE

## 2021-02-04 PROCEDURE — G8427 DOCREV CUR MEDS BY ELIG CLIN: HCPCS | Performed by: FAMILY MEDICINE

## 2021-02-04 PROCEDURE — 99214 OFFICE O/P EST MOD 30 MIN: CPT | Performed by: FAMILY MEDICINE

## 2021-02-04 PROCEDURE — G8417 CALC BMI ABV UP PARAM F/U: HCPCS | Performed by: FAMILY MEDICINE

## 2021-02-04 ASSESSMENT — PATIENT HEALTH QUESTIONNAIRE - PHQ9
1. LITTLE INTEREST OR PLEASURE IN DOING THINGS: 0
2. FEELING DOWN, DEPRESSED OR HOPELESS: 0
SUM OF ALL RESPONSES TO PHQ QUESTIONS 1-9: 0
SUM OF ALL RESPONSES TO PHQ QUESTIONS 1-9: 0

## 2021-02-04 NOTE — PROGRESS NOTES
Patient states she is here to follow up after last surgery in December. States that she is doing good except some constipation. No pain. States mood has been better, not feeling down or depressed. Takes aleve/ibuprofen for general body pain (hips and low back.) Has been using tylenol but not helping. Ibuprofen worked well, 2 tabs 1-2 times a day. Wants to know if can resume it now that her surgery is over. Doesn't check bp at home. Still smoking, 1/2-3/4 ppd. States that is how she relieves stress. Also wants to know if needs chol meds, was on in past but hasn't taken in many months. Vitals:    02/04/21 1537   BP: 110/66   Site: Left Upper Arm   Position: Sitting   Cuff Size: Large Adult   Pulse: 89   Temp: 96.9 °F (36.1 °C)   TempSrc: Infrared   SpO2: 99%   Weight: 176 lb 12.8 oz (80.2 kg)     Wt Readings from Last 3 Encounters:   02/04/21 176 lb 12.8 oz (80.2 kg)   12/17/20 170 lb 9.6 oz (77.4 kg)   12/01/20 176 lb (79.8 kg)     Body mass index is 35.71 kg/m². PHQ Scores 2/4/2021 12/1/2020 11/21/2019 3/5/2015   PHQ2 Score 0 3 0 0   PHQ9 Score 0 10 0 0           GEN: Alert and oriented x 4 NAD, affect appropriate and obese, well hydrated, well developed. ASSESSMENT AND PLAN:       [facility] was seen today for post-op check. Diagnoses and all orders for this visit:    Major depressive disorder with single episode, in full remission ([facility] [address]  Reports sx resolved  Monitor    History of renal cell cancer  -     Basic Metabolic Panel; Future  Recheck labs post surgery, if ok ok to restart ibuprofen    Pure hypercholesterolemia  Discussed labs done in Dec 2020- discussed have improved from 2019 and would not rec meds at this time    Tobacco use disorder  STRONGLY encouraged to quit. Pt aware major risk factor for her cancer. She states she would rather smoke than eat (her other stress mgmt device). States she knows she needs to quit but not ready yet. Encounter for screening mammogram for malignant neoplasm of breast  -     [facility] DIGITAL SCREEN W OR WO CAD BILATERAL;  Future

## 2021-02-12 ENCOUNTER — HOSPITAL ENCOUNTER (OUTPATIENT)
Dept: WOMENS IMAGING | Age: 46
Discharge: HOME | End: 2021-02-12
Payer: COMMERCIAL

## 2021-02-12 ENCOUNTER — HOSPITAL ENCOUNTER (OUTPATIENT)
Age: 46
Discharge: HOME | End: 2021-02-12
Payer: COMMERCIAL

## 2021-02-12 DIAGNOSIS — Z85.528 HISTORY OF RENAL CELL CANCER: ICD-10-CM

## 2021-02-12 DIAGNOSIS — Z12.31 ENCOUNTER FOR SCREENING MAMMOGRAM FOR MALIGNANT NEOPLASM OF BREAST: ICD-10-CM

## 2021-02-12 LAB
ANION GAP SERPL CALCULATED.3IONS-SCNC: 9 MMOL/L (ref 3–16)
BUN BLDV-MCNC: 9 MG/DL (ref 7–20)
CALCIUM SERPL-MCNC: 9.8 MG/DL (ref 8.3–10.6)
CHLORIDE BLD-SCNC: 102 MMOL/L (ref 99–110)
CO2: 28 MMOL/L (ref 21–32)
CREAT SERPL-MCNC: <0.5 MG/DL (ref 0.6–1.1)
GFR AFRICAN AMERICAN: >60
GFR NON-AFRICAN AMERICAN: >60
GLUCOSE BLD-MCNC: 88 MG/DL (ref 70–99)
POTASSIUM SERPL-SCNC: 4.3 MMOL/L (ref 3.5–5.1)
SODIUM BLD-SCNC: 139 MMOL/L (ref 136–145)

## 2021-02-12 PROCEDURE — 80048 BASIC METABOLIC PNL TOTAL CA: CPT

## 2021-02-12 PROCEDURE — 77067 SCR MAMMO BI INCL CAD: CPT

## 2021-02-12 PROCEDURE — 36415 COLL VENOUS BLD VENIPUNCTURE: CPT

## 2021-04-15 ENCOUNTER — OFFICE VISIT (OUTPATIENT)
Dept: FAMILY MEDICINE CLINIC | Age: 46
End: 2021-04-15
Payer: COMMERCIAL

## 2021-04-15 ENCOUNTER — TELEPHONE (OUTPATIENT)
Dept: FAMILY MEDICINE CLINIC | Age: 46
End: 2021-04-15

## 2021-04-15 ENCOUNTER — HOSPITAL ENCOUNTER (OUTPATIENT)
Dept: VASCULAR LAB | Age: 46
Discharge: HOME | End: 2021-04-15
Payer: COMMERCIAL

## 2021-04-15 VITALS
BODY MASS INDEX: 37 KG/M2 | WEIGHT: 183.2 LBS | DIASTOLIC BLOOD PRESSURE: 72 MMHG | OXYGEN SATURATION: 98 % | SYSTOLIC BLOOD PRESSURE: 118 MMHG | HEART RATE: 84 BPM | TEMPERATURE: 97.1 F

## 2021-04-15 DIAGNOSIS — M54.42 CHRONIC BILATERAL LOW BACK PAIN WITH BILATERAL SCIATICA: Primary | ICD-10-CM

## 2021-04-15 DIAGNOSIS — M54.41 CHRONIC BILATERAL LOW BACK PAIN WITH BILATERAL SCIATICA: Primary | ICD-10-CM

## 2021-04-15 DIAGNOSIS — G89.29 CHRONIC BILATERAL LOW BACK PAIN WITH BILATERAL SCIATICA: Primary | ICD-10-CM

## 2021-04-15 DIAGNOSIS — M79.604 BILATERAL LOWER EXTREMITY PAIN: Primary | ICD-10-CM

## 2021-04-15 DIAGNOSIS — M79.604 BILATERAL LOWER EXTREMITY PAIN: ICD-10-CM

## 2021-04-15 DIAGNOSIS — M79.605 BILATERAL LOWER EXTREMITY PAIN: Primary | ICD-10-CM

## 2021-04-15 DIAGNOSIS — M79.604 BILATERAL LEG PAIN: Primary | ICD-10-CM

## 2021-04-15 DIAGNOSIS — M79.605 BILATERAL LEG PAIN: Primary | ICD-10-CM

## 2021-04-15 DIAGNOSIS — M79.605 BILATERAL LOWER EXTREMITY PAIN: ICD-10-CM

## 2021-04-15 PROCEDURE — 4004F PT TOBACCO SCREEN RCVD TLK: CPT | Performed by: NURSE PRACTITIONER

## 2021-04-15 PROCEDURE — 93970 EXTREMITY STUDY: CPT

## 2021-04-15 PROCEDURE — G8427 DOCREV CUR MEDS BY ELIG CLIN: HCPCS | Performed by: NURSE PRACTITIONER

## 2021-04-15 PROCEDURE — G8417 CALC BMI ABV UP PARAM F/U: HCPCS | Performed by: NURSE PRACTITIONER

## 2021-04-15 PROCEDURE — 99214 OFFICE O/P EST MOD 30 MIN: CPT | Performed by: NURSE PRACTITIONER

## 2021-04-15 RX ORDER — PREDNISONE 20 MG/1
TABLET ORAL
Qty: 18 TABLET | Refills: 0 | Status: SHIPPED | OUTPATIENT
Start: 2021-04-15 | End: 2021-12-16 | Stop reason: ALTCHOICE

## 2021-04-15 RX ORDER — CYCLOBENZAPRINE HCL 10 MG
10 TABLET ORAL 3 TIMES DAILY PRN
Qty: 15 TABLET | Refills: 0 | Status: SHIPPED | OUTPATIENT
Start: 2021-04-15 | End: 2021-04-20

## 2021-04-15 RX ORDER — LANOLIN ALCOHOL/MO/W.PET/CERES
3 CREAM (GRAM) TOPICAL DAILY
COMMUNITY
End: 2021-12-16 | Stop reason: ALTCHOICE

## 2021-04-15 ASSESSMENT — ENCOUNTER SYMPTOMS
NAUSEA: 0
BACK PAIN: 1
VOMITING: 0
DIARRHEA: 0
COUGH: 0
SHORTNESS OF BREATH: 0

## 2021-04-15 NOTE — PROGRESS NOTES
Karie Kent  : 1975  Encounter date: 4/15/2021    This tomasa 39 y.o. female who presents with  Chief Complaint   Patient presents with    Leg Swelling     Bilateral leg swelling and pain right worse than left. worsening. History of present illness:    HPI   1. Presents to clinic today with concerns with low back/bilateral hip discomfort with bilateral sciatic discomfort that started approximately one week prior and has progressively been worsening. Reports when attempting to cross legs will have pain that shoots down into her feet. Notes some swelling in bilateral legs - knees down - some mild swelling that has seemed to worsen after being on her feet for 12 hours of work. Sleeps with legs elevated because of discomfort. Has been taking tylenol and ibuprofen with minimal relief. Denies any acute injury. Reports back issues since she was a teenager. Last flare of back pain 2 weeks prior - reports this seems different and is worse than normal.  Does see a chiropractor once weekly - last visit 2 weeks prior. 2. 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 3. Had J&J vaccination 2 weeks prior.     No Known Allergies  Current Outpatient Medications   Medication Sig Dispense Refill    melatonin 3 MG TABS tablet Take 3 mg by mouth daily      predniSONE (DELTASONE) 20 MG tablet Take 3 tabs for 3 days, 2 tabs for 3 days and 1 tab for 3 days 18 tablet 0    cyclobenzaprine (FLEXERIL) 10 MG tablet Take 1 tablet by mouth 3 times daily as needed for Muscle spasms 15 tablet 0    Docusate Sodium (COLACE PO) Take by mouth daily      acetaminophen (TYLENOL) 500 MG tablet Take 1,000 mg by mouth every 6 hours as needed for Pain      Multiple Vitamins-Minerals (THERAPEUTIC MULTIVITAMIN-MINERALS) tablet Take 1 tablet by mouth daily      Nutritional Supplements (ESTROVEN PO) Take by mouth      BLACK ELDERBERRY PO Take by mouth      Cinnamon 500 MG CAPS Take by mouth      promethazine (PHENERGAN) 25 MG tablet Take 25 mg by mouth 4 times daily as needed for Nausea      meclizine (ANTIVERT) 25 MG tablet Take 1 tablet by mouth 3 times daily as needed for Dizziness 60 tablet 3    naproxen sodium (ALEVE) 220 MG tablet Take 2 tablets by mouth 2 times daily as needed        No current facility-administered medications for this visit. Review of Systems   Constitutional: Negative for activity change, appetite change, chills, fatigue and fever. Respiratory: Negative for cough and shortness of breath. Cardiovascular: Negative for chest pain and palpitations. Gastrointestinal: Negative for diarrhea, nausea and vomiting. Musculoskeletal: Positive for back pain. Past medical, surgical, family and social history were reviewed and updated with the patient. Objective:    /72 (Site: Left Upper Arm, Position: Sitting, Cuff Size: Large Adult)   Pulse 84   Temp 97.1 °F (36.2 °C)   Wt 183 lb 3.2 oz (83.1 kg)   LMP 03/29/2018   SpO2 98%   BMI 37.00 kg/m²   Weight: 183 lb 3.2 oz (83.1 kg)     BP Readings from Last 3 Encounters:   04/15/21 118/72   02/04/21 110/66   12/18/20 116/75     Wt Readings from Last 3 Encounters:   04/15/21 183 lb 3.2 oz (83.1 kg)   02/04/21 176 lb 12.8 oz (80.2 kg)   12/17/20 170 lb 9.6 oz (77.4 kg)     Physical Exam  Constitutional:       General: She is not in acute distress. Appearance: She is well-developed. HENT:      Head: Normocephalic and atraumatic. Cardiovascular:      Rate and Rhythm: Normal rate and regular rhythm. Heart sounds: Normal heart sounds, S1 normal and S2 normal.   Pulmonary:      Effort: Pulmonary effort is normal. No respiratory distress. Breath sounds: Normal breath sounds. Musculoskeletal:      Lumbar back: She exhibits tenderness (Bilateral paraspinal, bilateral lateral hip) and spasm. She exhibits normal range of motion, no bony tenderness and no deformity.         Back:    Skin: General: Skin is warm and dry. Neurological:      Mental Status: She is alert and oriented to person, place, and time. Psychiatric:         Thought Content: Thought content normal.         Judgment: Judgment normal.       Assessment/Plan    1. Chronic bilateral low back pain with bilateral sciatica  Initiate prednisone and PRN flexeril. Provided with stretches for back/sciatica. Offered PT - patient declined. Monitor symptoms - follow up with office if symptoms worsened. - predniSONE (DELTASONE) 20 MG tablet; Take 3 tabs for 3 days, 2 tabs for 3 days and 1 tab for 3 days  Dispense: 18 tablet; Refill: 0  - cyclobenzaprine (FLEXERIL) 10 MG tablet; Take 1 tablet by mouth 3 times daily as needed for Muscle spasms  Dispense: 15 tablet; Refill: 0    2. Bilateral lower extremity pain  Complete doppler study prior to office visit to r/o DVT - reviewed by Dr. Ramo Elmore - study normal.      Cruzito Jacobs was counseled regarding symptoms of current diagnosis, course and complications of disease if inadequately treated. Discussed side effects of medications, diagnosis, treatment options, and prognosis along with risks, benefits, complications, and alternatives of treatment including labs, imaging and other studies/treatment targets and goals. She verbalized understanding of instructions and counseling. Return if symptoms worsen or fail to improve. Medical decision making of moderate complexity.

## 2021-04-15 NOTE — TELEPHONE ENCOUNTER
PT got  1st Covid vaccine it was a [employer] brand on 04/01. Pt is now having pain on both legs and hips for about a week Pt has taken motrin and tylenol but, still feels pain. Murali Ortiz     Please Advice

## 2021-04-15 NOTE — TELEPHONE ENCOUNTER
Get bilateral venous dopplers - stat  Dx: bilateral leg pain post vaccine - r/o DVT  Needs to be seen - today preferably

## 2021-04-15 NOTE — PATIENT INSTRUCTIONS
Patient Education        Low Back Pain: Exercises  Introduction  Here are some examples of exercises for you to try. The exercises may be suggested for a condition or for rehabilitation. Start each exercise slowly. Ease off the exercises if you start to have pain. You will be told when to start these exercises and which ones will work best for you. How to do the exercises  Press-up   1. Lie on your stomach, supporting your body with your forearms. 2. Press your elbows down into the floor to raise your upper back. As you do this, relax your stomach muscles and allow your back to arch without using your back muscles. As your press up, do not let your hips or pelvis come off the floor. 3. Hold for 15 to 30 seconds, then relax. 4. Repeat 2 to 4 times. Alternate arm and leg (bird dog) exercise   Do this exercise slowly. Try to keep your body straight at all times, and do not let one hip drop lower than the other. 1. Start on the floor, on your hands and knees. 2. Tighten your belly muscles. 3. Raise one leg off the floor, and hold it straight out behind you. Be careful not to let your hip drop down, because that will twist your trunk. 4. Hold for about 6 seconds, then lower your leg and switch to the other leg. 5. Repeat 8 to 12 times on each leg. 6. Over time, work up to holding for 10 to 30 seconds each time. 7. If you feel stable and secure with your leg raised, try raising the opposite arm straight out in front of you at the same time. Knee-to-chest exercise   1. Lie on your back with your knees bent and your feet flat on the floor. 2. Bring one knee to your chest, keeping the other foot flat on the floor (or keeping the other leg straight, whichever feels better on your lower back). 3. Keep your lower back pressed to the floor. Hold for at least 15 to 30 seconds. 4. Relax, and lower the knee to the starting position. 5. Repeat with the other leg. Repeat 2 to 4 times with each leg.   6. To get touching the floor and the other heel touching the wall. 4. Repeat with your other leg. 5. Do 2 to 4 times for each leg. Hip flexor stretch   1. Kneel on the floor with one knee bent and one leg behind you. Place your forward knee over your foot. Keep your other knee touching the floor. 2. Slowly push your hips forward until you feel a stretch in the upper thigh of your rear leg. 3. Hold the stretch for at least 15 to 30 seconds. Repeat with your other leg. 4. Do 2 to 4 times on each side. Wall sit   1. Stand with your back 10 to 12 inches away from a wall. 2. Lean into the wall until your back is flat against it. 3. Slowly slide down until your knees are slightly bent, pressing your lower back into the wall. 4. Hold for about 6 seconds, then slide back up the wall. 5. Repeat 8 to 12 times. Follow-up care is a key part of your treatment and safety. Be sure to make and go to all appointments, and call your doctor if you are having problems. It's also a good idea to know your test results and keep a list of the medicines you take. Where can you learn more? Go to https://Tanslerpe"[employer].Wellbe. org and sign in to your [employer] account. Enter F694 in the [employer] box to learn more about \"Low Back Pain: Exercises. \"     If you do not have an account, please click on the \"Sign Up Now\" link. Current as of: November 16, 2020               Content Version: 12.8  © 2006-2021 Healthwise, Incorporated. Care instructions adapted under license by [facility] ([facility]). If you have questions about a medical condition or this instruction, always ask your healthcare professional. [place] [address] any warranty or liability for your use of this information. Patient Education        Sciatica: Exercises  Introduction  Here are some examples of typical rehabilitation exercises for your condition. Start each exercise slowly. Ease off the exercise if you start to have pain.   Your doctor or physical therapist will tell you when you can start these exercises and which ones will work best for you. When you are not being active, find a comfortable position for rest. Some people are comfortable on the floor or a medium-firm bed with a small pillow under their head and another under their knees. Some people prefer to lie on their side with a pillow between their knees. Don't stay in one position for too long. Take short walks (10 to 20 minutes) every 2 to 3 hours. Avoid slopes, hills, and stairs until you feel better. Walk only distances you can manage without pain, especially leg pain. How to do the exercises  Back stretches   1. Get down on your hands and knees on the floor. 2. Relax your head and allow it to droop. Round your back up toward the ceiling until you feel a nice stretch in your upper, middle, and lower back. Hold this stretch for as long as it feels comfortable, or about 15 to 30 seconds. 3. Return to the starting position with a flat back while you are on your hands and knees. 4. Let your back sway by pressing your stomach toward the floor. Lift your buttocks toward the ceiling. 5. Hold this position for 15 to 30 seconds. 6. Repeat 2 to 4 times. Follow-up care is a key part of your treatment and safety. Be sure to make and go to all appointments, and call your doctor if you are having problems. It's also a good idea to know your test results and keep a list of the medicines you take. Where can you learn more? Go to https://PlexisoftpeB[employer]Mass.Skillshare. org and sign in to your [employer] account. Enter E031 in the [facility] box to learn more about \"Sciatica: Exercises. \"     If you do not have an account, please click on the \"Sign Up Now\" link. Current as of: November 16, 2020               Content Version: 12.8  © 0226-4806 Healthwise, Incorporated. Care instructions adapted under license by [facility] ([facility]).  If you have questions about a medical condition or this instruction, always ask your healthcare professional. [place] [address] any warranty or liability for your use of this information.

## 2021-05-12 RX ORDER — MECLIZINE HYDROCHLORIDE 25 MG/1
25 TABLET ORAL 3 TIMES DAILY PRN
Qty: 60 TABLET | Refills: 2 | Status: SHIPPED | OUTPATIENT
Start: 2021-05-12 | End: 2021-12-16 | Stop reason: SDUPTHER

## 2021-07-02 ENCOUNTER — TELEPHONE (OUTPATIENT)
Dept: FAMILY MEDICINE CLINIC | Age: 46
End: 2021-07-02

## 2021-07-23 ENCOUNTER — TELEPHONE (OUTPATIENT)
Dept: FAMILY MEDICINE CLINIC | Age: 46
End: 2021-07-23

## 2021-07-23 DIAGNOSIS — G47.00 INSOMNIA, UNSPECIFIED TYPE: Primary | ICD-10-CM

## 2021-07-23 RX ORDER — HYDROXYZINE 50 MG/1
50 TABLET, FILM COATED ORAL NIGHTLY
Qty: 30 TABLET | Refills: 0 | Status: SHIPPED | OUTPATIENT
Start: 2021-07-23 | End: 2021-08-22

## 2021-07-23 NOTE — TELEPHONE ENCOUNTER
Patient called and would like to know if you can prescribe something for her to sleep.   She has been using over the counter Melatonin      Patient would like a callback if/when prescription is called in          [facility] [address] [facility], [address] [place] [address] [address]   [address] [place] [address] [place] [address]   Phone:  [phone]  Fax:  [phone]

## 2021-08-26 ENCOUNTER — OFFICE VISIT (OUTPATIENT)
Dept: FAMILY MEDICINE CLINIC | Age: 46
End: 2021-08-26
Payer: COMMERCIAL

## 2021-08-26 VITALS
TEMPERATURE: 98.2 F | BODY MASS INDEX: 38.25 KG/M2 | OXYGEN SATURATION: 98 % | HEART RATE: 91 BPM | SYSTOLIC BLOOD PRESSURE: 110 MMHG | DIASTOLIC BLOOD PRESSURE: 68 MMHG | WEIGHT: 189.4 LBS

## 2021-08-26 DIAGNOSIS — G47.00 INSOMNIA, UNSPECIFIED TYPE: Primary | ICD-10-CM

## 2021-08-26 PROCEDURE — G8427 DOCREV CUR MEDS BY ELIG CLIN: HCPCS | Performed by: FAMILY MEDICINE

## 2021-08-26 PROCEDURE — 4004F PT TOBACCO SCREEN RCVD TLK: CPT | Performed by: FAMILY MEDICINE

## 2021-08-26 PROCEDURE — 99213 OFFICE O/P EST LOW 20 MIN: CPT | Performed by: FAMILY MEDICINE

## 2021-08-26 PROCEDURE — G8417 CALC BMI ABV UP PARAM F/U: HCPCS | Performed by: FAMILY MEDICINE

## 2021-08-26 RX ORDER — DOXEPIN HYDROCHLORIDE 10 MG/1
10 CAPSULE ORAL NIGHTLY
Qty: 30 CAPSULE | Refills: 3 | Status: SHIPPED | OUTPATIENT
Start: 2021-08-26 | End: 2021-12-16 | Stop reason: SDUPTHER

## 2021-08-26 NOTE — PROGRESS NOTES
Patient is here today for follow up on new sleeping medication. States has been having issues for a year. Thinks due to stress at work. States falls asleep ok but waking up frequently. Tried melatonin - helped for a short time and then tried a different med but that didn't help at all. She was prescribed Atarax and states that the medication isn't really working. Said that if she takes a whole dose, she is in a constant \"brain fog\", she has been taking 1/2 dose and is only getting 6 hrs of sleep. Said that the deep sleep has increased somewhat with full tab but not half. Also works night shift although does try to maintain the schedule. Vitals:    08/26/21 1603   BP: 110/68   Site: Left Upper Arm   Position: Sitting   Cuff Size: Large Adult   Pulse: 91   Temp: 98.2 °F (36.8 °C)   TempSrc: Infrared   SpO2: 98%   Weight: 189 lb 6.4 oz (85.9 kg)     Wt Readings from Last 3 Encounters:   08/26/21 189 lb 6.4 oz (85.9 kg)   04/15/21 183 lb 3.2 oz (83.1 kg)   02/04/21 176 lb 12.8 oz (80.2 kg)     Body mass index is 38.25 kg/m². PHQ Scores 2/4/2021 12/1/2020 11/21/2019 3/5/2015   PHQ2 Score 0 3 0 0   PHQ9 Score 0 10 0 0           GEN: Alert and oriented x 4 NAD, affect appropriate and obese, well hydrated, well developed.    ,    ASSESSMENT AND PLAN:       Zaid was seen today for follow-up and insomnia. Diagnoses and all orders for this visit:    Insomnia, unspecified type  Trial doxepin  Call with update in 2 weeks    Other orders  -     doxepin (SINEQUAN) 10 MG capsule;  Take 1 capsule by mouth nightly              Portions of Note per  Kamran Munson CMA AAMA with corrections and edits per Eulogio Salcido MD.  I agree with entirety of note and was present and performed history and physical.  I also confirm that the note above accurately reflects all work, treatment, procedures, and medical decision making performed by me, Eulogio Salcido MD

## 2021-12-16 ENCOUNTER — HOSPITAL ENCOUNTER (OUTPATIENT)
Age: 46
Discharge: HOME | End: 2021-12-16
Payer: COMMERCIAL

## 2021-12-16 ENCOUNTER — OFFICE VISIT (OUTPATIENT)
Dept: FAMILY MEDICINE CLINIC | Age: 46
End: 2021-12-16
Payer: COMMERCIAL

## 2021-12-16 VITALS
TEMPERATURE: 97.3 F | OXYGEN SATURATION: 98 % | SYSTOLIC BLOOD PRESSURE: 110 MMHG | HEIGHT: 59 IN | WEIGHT: 195 LBS | BODY MASS INDEX: 39.31 KG/M2 | DIASTOLIC BLOOD PRESSURE: 68 MMHG | HEART RATE: 91 BPM

## 2021-12-16 DIAGNOSIS — E78.00 PURE HYPERCHOLESTEROLEMIA: ICD-10-CM

## 2021-12-16 DIAGNOSIS — R07.89 RIGHT-SIDED CHEST WALL PAIN: ICD-10-CM

## 2021-12-16 DIAGNOSIS — Z63.79 STRESSFUL LIFE EVENT AFFECTING FAMILY: ICD-10-CM

## 2021-12-16 DIAGNOSIS — Z00.00 WELL ADULT EXAM: Primary | ICD-10-CM

## 2021-12-16 DIAGNOSIS — G47.00 INSOMNIA, UNSPECIFIED TYPE: ICD-10-CM

## 2021-12-16 LAB
A/G RATIO: 1.7 (ref 1.1–2.2)
ALBUMIN SERPL-MCNC: 4.5 G/DL (ref 3.4–5)
ALP BLD-CCNC: 103 U/L (ref 40–129)
ALT SERPL-CCNC: 23 U/L (ref 10–40)
ANION GAP SERPL CALCULATED.3IONS-SCNC: 10 MMOL/L (ref 3–16)
AST SERPL-CCNC: 17 U/L (ref 15–37)
BILIRUB SERPL-MCNC: 0.4 MG/DL (ref 0–1)
BUN BLDV-MCNC: 11 MG/DL (ref 7–20)
CALCIUM SERPL-MCNC: 10 MG/DL (ref 8.3–10.6)
CHLORIDE BLD-SCNC: 101 MMOL/L (ref 99–110)
CHOLESTEROL, TOTAL: 235 MG/DL (ref 0–199)
CO2: 27 MMOL/L (ref 21–32)
CREAT SERPL-MCNC: 0.5 MG/DL (ref 0.6–1.1)
GFR AFRICAN AMERICAN: >60
GFR NON-AFRICAN AMERICAN: >60
GLUCOSE BLD-MCNC: 89 MG/DL (ref 70–99)
HDLC SERPL-MCNC: 43 MG/DL (ref 40–60)
LDL CHOLESTEROL CALCULATED: 158 MG/DL
POTASSIUM SERPL-SCNC: 4.3 MMOL/L (ref 3.5–5.1)
SODIUM BLD-SCNC: 138 MMOL/L (ref 136–145)
TOTAL PROTEIN: 7.2 G/DL (ref 6.4–8.2)
TRIGL SERPL-MCNC: 172 MG/DL (ref 0–150)
VLDLC SERPL CALC-MCNC: 34 MG/DL

## 2021-12-16 PROCEDURE — 4004F PT TOBACCO SCREEN RCVD TLK: CPT | Performed by: FAMILY MEDICINE

## 2021-12-16 PROCEDURE — 80061 LIPID PANEL: CPT

## 2021-12-16 PROCEDURE — 36415 COLL VENOUS BLD VENIPUNCTURE: CPT

## 2021-12-16 PROCEDURE — G8417 CALC BMI ABV UP PARAM F/U: HCPCS | Performed by: FAMILY MEDICINE

## 2021-12-16 PROCEDURE — G8484 FLU IMMUNIZE NO ADMIN: HCPCS | Performed by: FAMILY MEDICINE

## 2021-12-16 PROCEDURE — 99396 PREV VISIT EST AGE 40-64: CPT | Performed by: FAMILY MEDICINE

## 2021-12-16 PROCEDURE — 99214 OFFICE O/P EST MOD 30 MIN: CPT | Performed by: FAMILY MEDICINE

## 2021-12-16 PROCEDURE — G8427 DOCREV CUR MEDS BY ELIG CLIN: HCPCS | Performed by: FAMILY MEDICINE

## 2021-12-16 PROCEDURE — 80053 COMPREHEN METABOLIC PANEL: CPT

## 2021-12-16 RX ORDER — MECLIZINE HYDROCHLORIDE 25 MG/1
25 TABLET ORAL 3 TIMES DAILY PRN
Qty: 60 TABLET | Refills: 2 | Status: SHIPPED | OUTPATIENT
Start: 2021-12-16

## 2021-12-16 RX ORDER — DOXEPIN HYDROCHLORIDE 25 MG/1
25 CAPSULE ORAL NIGHTLY
Qty: 30 CAPSULE | Refills: 5 | Status: SHIPPED | OUTPATIENT
Start: 2021-12-16 | End: 2022-07-18

## 2021-12-16 SDOH — ECONOMIC STABILITY: FOOD INSECURITY: WITHIN THE PAST 12 MONTHS, THE FOOD YOU BOUGHT JUST DIDN'T LAST AND YOU DIDN'T HAVE MONEY TO GET MORE.: NEVER TRUE

## 2021-12-16 SDOH — ECONOMIC STABILITY: FOOD INSECURITY: WITHIN THE PAST 12 MONTHS, YOU WORRIED THAT YOUR FOOD WOULD RUN OUT BEFORE YOU GOT MONEY TO BUY MORE.: NEVER TRUE

## 2021-12-16 ASSESSMENT — SOCIAL DETERMINANTS OF HEALTH (SDOH): HOW HARD IS IT FOR YOU TO PAY FOR THE VERY BASICS LIKE FOOD, HOUSING, MEDICAL CARE, AND HEATING?: NOT HARD AT ALL

## 2021-12-16 NOTE — PATIENT INSTRUCTIONS
If you find you often feel sad or hopeless, talk with your doctor. Treatment can help. · Talk to your doctor about whether you have any risk factors for sexually transmitted infections (STIs). You can help prevent STIs if you wait to have sex with a new partner (or partners) until you've each been tested for STIs. It also helps if you use condoms (male or female condoms) and if you limit your sex partners to one person who only has sex with you. Vaccines are available for some STIs, such as HPV. · Use birth control if it's important to you to prevent pregnancy. Talk with your doctor about the choices available and what might be best for you. · If you think you may have a problem with alcohol or drug use, talk to your doctor. This includes prescription medicines (such as amphetamines and opioids) and illegal drugs (such as cocaine and methamphetamine). Your doctor can help you figure out what type of treatment is best for you. · Protect your skin from too much sun. When you're outdoors from 10 a.m. to 4 p.m., stay in the shade or cover up with clothing and a hat with a wide brim. Wear sunglasses that block UV rays. Even when it's cloudy, put broad-spectrum sunscreen (SPF 30 or higher) on any exposed skin. · See a dentist one or two times a year for checkups and to have your teeth cleaned. · Wear a seat belt in the car. When should you call for help? Watch closely for changes in your health, and be sure to contact your doctor if you have any problems or symptoms that concern you. Where can you learn more? Go to [email or web address] and sign in to your [employer] account. Enter P072 in the [facility] box to learn more about \"Well Visit, Ages 25 to 48: Care Instructions. \"     If you do not have an account, please click on the \"Sign Up Now\" link. Current as of: February 11, 2021               Content Version: 13.0  © 6785-8433 Healthwise, Incorporated.    Care instructions adapted under license by [facility] ([facility]). If you have questions about a medical condition or this instruction, always ask your healthcare professional. [address] any warranty or liability for your use of this information. Please bring in a copy of your living will and healthcare power of  to put in your chart. Link to forms:   [email or web address]  [email or web address]        Advance Directives and DNR    Decision making at the end of life is difficult for patients, families and health care providers. Since the early 1990s, a number of forms have been developed to help people express their wishes in advance. What are \"advance directives\"? Advance directives are documents that can help you remain in charge of your health care even after you can no longer make decisions for yourself. The two most common forms of written advance directives are the living will and durable power of  for healthcare. Some people seek an s services to complete these documents; however this is not required. You can complete these documents yourself and have them either notarized or witnessed by two people who are over 25 and not related to you by blood or marriage. What is a \"living will\"? A living will is a document that tells your doctor how you want to be treated if when you are determined to be terminally ill or permanently unconscious and you cannot make decisions for yourself. You can use a living will if you want to avoid life-prolonging treatments such as cardiopulmonary resuscitation (CPR), kidney dialysis or breathing machines. You can use your living will to tell your doctor that you just want to be pain free at the end of our life. In [place], the living will is sometimes called a \"declaration\".  A living will form can be obtained from attorneys, [facility] organizations, and healthcare facilities. This signed form must be notarized or witnessed. What is a \"durable power of  for healthcare\"? A medical power of  (medical POA) is another type of advance directive that allows you to name a person to make health care decisions for you if and when you become unable to make them for yourself. The person you name to make decisions on your behalf is some times called your health care surrogate, agent, proxy or -in-fact. The person who holds your medical POA can respond to medical situations you might not have anticipated and make decisions for you empowered by knowledge of your values and wishes. The medical POA form can be obtained from attorneys, [facility], and healthcare facilities. This signed form must be notarized or witnessed. The medical POA document is different from the power of  form that authorizes someone to make financial transactions for you. What is cardiopulmonary resuscitation (CPR)? CPR is a technique useful in many emergencies, including heart attack or near drowning, in which someone's breathing or heartbeat has stopped. CPR may include chest compression, mouth-to-mouth or other rescue breathing and/or electric shock. What is a DNR -Comfort Care form (a.k.a. [facility])? DNR means do not resuscitate. A DNR is a medical order given by a physician or other legally authorized prescriber. It addresses the various methods used to revive people whose hearts have stopped beating /or who have stopped breathing. If a person has a [facility] order, he will receive care that eases pain and suffering but no cardio-pulmonary resuscitation (CPR) to save or prolong life. The [facility] becomes active as soon as it is signed by the doctor or advanced practice nurse. The [facility] is a standard form which can be obtained from the [address] [facility] or healthcare facilities.     What is DNR Comfort Care - Arrest (a.k.a. [facility])? The [facility] is similar to the [facility] but it only becomes active if and when the person has a cardiac and/or respiratory arrest (i.e. the person stops breathing or his heart stops beating). The [facility] is a standard form which can be obtained from the [address] [facility] or healthcare facilities.

## 2021-12-16 NOTE — PROGRESS NOTES
Here for annual physical.  Chief Complaint   Patient presents with    Annual Exam     wellness, right sided breast/chest pain this morning        Dental: due  Eye: up-to-date    Pap: hysterectomy  Mammo: up-to-date    Exercise: active and on feet at work  Diet: states only eats once a day and not great    Living Will: No,   Additional information provided    [facility] Scores 2/4/2021 12/1/2020 11/21/2019 3/5/2015   PHQ2 Score 0 3 0 0   PHQ9 Score 0 10 0 0     States was at work today and started noticing sharp stabbing pains right upper chest and around breast. Felt better after napping. States thinks she swallowed something wrong as started after swallowing some soda. No pain with breathing. No sob. No radiation. Feels more dull now. No pain with movement. If pushes on it feels better. Has had in past but not for years. Took some aleve and some better. No belching or burping, no indigestion sx. Has been under more stress with work (works in [employer] [place]) and had to take in brother's 11 and 10 yo kids. Not sleeping well. Still taking doxepin, doesn't feel like working as well now. HM reviewed with pt    Patient's medications, allergies, past medical, surgical, social and family histories were reviewed and updated in the EHR as appropriate. Vitals:    12/16/21 1537   BP: 110/68   Pulse: 91   Temp: 97.3 °F (36.3 °C)   TempSrc: Temporal   SpO2: 98%   Weight: 195 lb (88.5 kg)   Height: 4' 11\" (1.499 m)     Wt Readings from Last 3 Encounters:   12/16/21 195 lb (88.5 kg)   08/26/21 189 lb 6.4 oz (85.9 kg)   04/15/21 183 lb 3.2 oz (83.1 kg)     Body mass index is 39.39 kg/m². GENERAL Alert and oriented x 4 NAD, affect appropriate and obese, well hydrated, well developed.   NECK:supple and non tender without mass, no thyromegaly or thyroid nodules, no cervical lymphadenopathy  HEENT: TM clear bilaterally  LUNG:clear to auscultation bilaterally with normal respiratory effort  CV: Normal heart sounds, regular rate and rhythm without murmurs  Right upper chest wall mild tender palpation  No adenopathy right axilla  EXTREMETY: no loss of hair, no edema, normal pedal pulses bilaterally  NEURO: CN grossly intact, moving all extremities equally, no gross deficits          ASSESSMENT AND PLAN:       Gail Kellogg was seen today for annual exam.    Diagnoses and all orders for this visit:    Well adult exam  Recommended screenings discussed and ordered if patient agreed  Recommended vaccinations discussed and ordered if patient agreed  Encouraged healthy diet   Encouraged regular exercise and maintaining a healthy weight  Discussed living will/healthcare POA and encouraged to get if doesn't have. Pure hypercholesterolemia  -     Lipid Panel; Future  -     Comprehensive Metabolic Panel; Future  -Stable, continue current medications. (Labs ordered contributed to MDM)    Insomnia, unspecified type  Increase doxepin  -     doxepin (SINEQUAN) 25 MG capsule; Take 1 capsule by mouth nightly    Right-sided chest wall pain  Seems m/s, sx tx, monitor    Stressful life event affecting family  Discussed looking into EAP at work  If worsening sx RTO to discuss more    Other orders    -     meclizine (ANTIVERT) 25 MG tablet; Take 1 tablet by mouth 3 times daily as needed for Dizziness            Return in about 1 year (around 12/16/2022) for Well, 30 min.

## 2021-12-29 ENCOUNTER — TELEPHONE (OUTPATIENT)
Dept: FAMILY MEDICINE CLINIC | Age: 46
End: 2021-12-29

## 2021-12-29 NOTE — TELEPHONE ENCOUNTER
----- Message from Pablo Muñiz sent at 12/29/2021  3:46 PM EST -----  Subject: Message to Provider    QUESTIONS  Information for Provider? biometircs screening needs to be sent to   patient's employment, [employer] [phone], needs to be sent by Friday morning   on 01/31/2021   ---------------------------------------------------------------------------  --------------  CALL BACK INFO  What is the best way for the office to contact you? OK to leave message on   voicemail  Preferred Call Back Phone Number? [phone]  ---------------------------------------------------------------------------  --------------  SCRIPT ANSWERS  Relationship to Patient?  Self

## 2021-12-30 NOTE — TELEPHONE ENCOUNTER
Patient informed form is missing, she is going to see if her HR department would fax it to us, if not she will bring in form today before closing.

## 2021-12-30 NOTE — TELEPHONE ENCOUNTER
Patient was calling in regards to this. She states that she would just like to come pick it up.     Please advise

## 2022-02-04 ENCOUNTER — TELEPHONE (OUTPATIENT)
Dept: FAMILY MEDICINE CLINIC | Age: 47
End: 2022-02-04

## 2022-02-04 NOTE — TELEPHONE ENCOUNTER
----- Message from [employer] sent at 2/3/2022  7:49 AM EST -----  Subject: Appointment Request    Reason for Call: Urgent (Patient Request) No Script    QUESTIONS  Type of Appointment? Established Patient  Reason for appointment request? Available appointments did not meet   patient need  Additional Information for Provider? Pt has a ear infection x2days, pt is   having pain in L ear and crackling sounds. pls call pt   ---------------------------------------------------------------------------  --------------  CALL BACK INFO  What is the best way for the office to contact you? OK to leave message on   voicemail  Preferred Call Back Phone Number? [phone]  ---------------------------------------------------------------------------  --------------  SCRIPT ANSWERS  Relationship to Patient? Self  (Is the patient requesting to see the provider for a procedure?)? No  (Is the patient requesting to see the provider urgently  today or   tomorrow. )? Yes  Have you been diagnosed with, awaiting test results for, or told that you   are suspected of having COVID-19 (Coronavirus)? (If patient has tested   negative or was tested as a requirement for work, school, or travel and   not based on symptoms, answer no)? No  Within the past two weeks have you developed any of the following symptoms   (answer no if symptoms have been present longer than 2 weeks or began   more than 2 weeks ago)? Fever or Chills, Cough, Shortness of breath or   difficulty breathing, Loss of taste or smell, Sore throat, Nasal   congestion, Sneezing or runny nose, Fatigue or generalized body aches   (answer no if pain is specific to a body part e.g. back pain), Diarrhea,   Headache? No  Have you had close contact with someone with COVID-19 in the last 14 days? No  (Service Expert  click yes below to proceed with [employer]ual   Scheduling)?  Yes

## 2022-02-04 NOTE — TELEPHONE ENCOUNTER
Left message to call to set patient up with appointment. If no other URI symptoms, ok for reg visit and she can come now.

## 2022-02-07 NOTE — TELEPHONE ENCOUNTER
----- Message from Katjaakua Dennis sent at 2/4/2022  3:40 PM EST -----  Subject: Appointment Request    Reason for Call: Ear Problem    QUESTIONS  Type of Appointment? Established Patient  Reason for appointment request? No appointments available during search  Additional Information for Provider? pt is needing to make an appt for her   ears per  request  ---------------------------------------------------------------------------  --------------  Janine NAYAK  What is the best way for the office to contact you? OK to leave message on   voicemail  Preferred Call Back Phone Number? [phone]  ---------------------------------------------------------------------------  --------------  SCRIPT ANSWERS  Relationship to Patient? Self  Did you have an injury or trauma within the past three days? No  Is your pain affecting your daily activities? No  Are you having fevers (100.4), chills or sweats? No  Are you experiencing new onset hearing loss? No  Did your symptoms begin within the last 2 weeks? Yes  Have you been diagnosed with, awaiting test results for, or told that you   are suspected of having COVID-19 (Coronavirus)? (If patient has tested   negative or was tested as a requirement for work, school, or travel and   not based on symptoms, answer no)? No  Within the past two weeks have you developed any of the following symptoms   (answer no if symptoms have been present longer than 2 weeks or began   more than 2 weeks ago)? Fever or Chills, Cough, Shortness of breath or   difficulty breathing, Loss of taste or smell, Sore throat, Nasal   congestion, Sneezing or runny nose, Fatigue or generalized body aches   (answer no if pain is specific to a body part e.g. back pain), Diarrhea,   Headache? No  Have you had close contact with someone with COVID-19 in the last 14 days? No  (Service Expert  click yes below to proceed with [employer]ual   Scheduling)?  Yes

## 2022-07-18 RX ORDER — DOXEPIN HYDROCHLORIDE 25 MG/1
CAPSULE ORAL
Qty: 30 CAPSULE | Refills: 5 | Status: SHIPPED | OUTPATIENT
Start: 2022-07-18

## 2022-07-18 NOTE — TELEPHONE ENCOUNTER
Medication:   Requested Prescriptions     Pending Prescriptions Disp Refills    doxepin (SINEQUAN) 25 MG capsule [Pharmacy Med Name: DOXEPIN 25 MG CAPSULE] 30 capsule 5     Sig: TAKE ONE CAPSULE BY MOUTH ONCE NIGHTLY      Last Filled:      Patient Phone Number: [phone] (home)     Last appt: 12/16/2021   Next appt: Visit date not found    Last OARRS:   RX Monitoring 11/4/2020   Periodic Controlled Substance Monitoring No signs of potential drug abuse or diversion identified.      PDMP Monitoring:    Last PDMP Charleen Mcdaniel as Reviewed [facility]):  Review User Review Instant Review Result   Dave Gonzalez 11/4/2020 11:14 AM Reviewed PDMP [1]     Preferred Pharmacy:   [facility] [id number]  Felipa HANSON [address] [place] [address]  Phone: [phone] Fax: [phone]

## 2023-09-30 ENCOUNTER — HOSPITAL ENCOUNTER (OUTPATIENT)
Age: 48
Discharge: HOME | End: 2023-09-30
Attending: UROLOGY
Payer: COMMERCIAL

## 2023-09-30 ENCOUNTER — HOSPITAL ENCOUNTER (OUTPATIENT)
Dept: GENERAL RADIOLOGY | Age: 48
Discharge: HOME | End: 2023-09-30
Attending: UROLOGY
Payer: COMMERCIAL

## 2023-09-30 ENCOUNTER — HOSPITAL ENCOUNTER (OUTPATIENT)
Dept: CT IMAGING | Age: 48
Discharge: HOME | End: 2023-09-30
Attending: UROLOGY
Payer: COMMERCIAL

## 2023-09-30 DIAGNOSIS — N28.89 OTHER SPECIFIED DISORDERS OF KIDNEY AND URETER: ICD-10-CM

## 2023-09-30 DIAGNOSIS — R52 PAIN: ICD-10-CM

## 2023-09-30 LAB
ALBUMIN SERPL-MCNC: 4.7 G/DL (ref 3.4–5)
ALBUMIN/GLOB SERPL: 1.7 {RATIO} (ref 1.1–2.2)
ALP SERPL-CCNC: 97 U/L (ref 40–129)
ALT SERPL-CCNC: 16 U/L (ref 10–40)
ANION GAP SERPL CALCULATED.3IONS-SCNC: 7 MMOL/L (ref 3–16)
AST SERPL-CCNC: 17 U/L (ref 15–37)
BILIRUB SERPL-MCNC: <0.2 MG/DL (ref 0–1)
BUN SERPL-MCNC: 12 MG/DL (ref 7–20)
CALCIUM SERPL-MCNC: 9.9 MG/DL (ref 8.3–10.6)
CHLORIDE SERPL-SCNC: 102 MMOL/L (ref 99–110)
CO2 SERPL-SCNC: 28 MMOL/L (ref 21–32)
CREAT SERPL-MCNC: 0.6 MG/DL (ref 0.6–1.1)
GFR SERPLBLD CREATININE-BSD FMLA CKD-EPI: >60 ML/MIN/{1.73_M2}
GLUCOSE SERPL-MCNC: 104 MG/DL (ref 70–99)
POTASSIUM SERPL-SCNC: 4.8 MMOL/L (ref 3.5–5.1)
PROT SERPL-MCNC: 7.5 G/DL (ref 6.4–8.2)
SODIUM SERPL-SCNC: 137 MMOL/L (ref 136–145)

## 2023-09-30 PROCEDURE — 74176 CT ABD & PELVIS W/O CONTRAST: CPT

## 2023-09-30 PROCEDURE — 36415 COLL VENOUS BLD VENIPUNCTURE: CPT

## 2023-09-30 PROCEDURE — 80053 COMPREHEN METABOLIC PANEL: CPT

## 2023-09-30 PROCEDURE — 71046 X-RAY EXAM CHEST 2 VIEWS: CPT

## 2023-12-04 ENCOUNTER — HOSPITAL ENCOUNTER (OUTPATIENT)
Age: 48
Discharge: HOME | End: 2023-12-04
Payer: COMMERCIAL

## 2023-12-04 ENCOUNTER — OFFICE VISIT (OUTPATIENT)
Dept: FAMILY MEDICINE CLINIC | Age: 48
End: 2023-12-04
Payer: COMMERCIAL

## 2023-12-04 VITALS
OXYGEN SATURATION: 98 % | WEIGHT: 199.4 LBS | HEART RATE: 90 BPM | SYSTOLIC BLOOD PRESSURE: 100 MMHG | TEMPERATURE: 98.6 F | BODY MASS INDEX: 39.15 KG/M2 | DIASTOLIC BLOOD PRESSURE: 70 MMHG | HEIGHT: 60 IN

## 2023-12-04 DIAGNOSIS — R73.9 HYPERGLYCEMIA: ICD-10-CM

## 2023-12-04 DIAGNOSIS — Z12.31 ENCOUNTER FOR SCREENING MAMMOGRAM FOR MALIGNANT NEOPLASM OF BREAST: ICD-10-CM

## 2023-12-04 DIAGNOSIS — K21.9 GASTROESOPHAGEAL REFLUX DISEASE, UNSPECIFIED WHETHER ESOPHAGITIS PRESENT: ICD-10-CM

## 2023-12-04 DIAGNOSIS — E78.00 PURE HYPERCHOLESTEROLEMIA: ICD-10-CM

## 2023-12-04 DIAGNOSIS — E66.09 CLASS 2 OBESITY DUE TO EXCESS CALORIES WITHOUT SERIOUS COMORBIDITY WITH BODY MASS INDEX (BMI) OF 38.0 TO 38.9 IN ADULT: ICD-10-CM

## 2023-12-04 DIAGNOSIS — Z00.00 WELL ADULT EXAM: Primary | ICD-10-CM

## 2023-12-04 DIAGNOSIS — Z23 NEED FOR VACCINATION: ICD-10-CM

## 2023-12-04 DIAGNOSIS — K63.5 POLYP OF COLON, UNSPECIFIED PART OF COLON, UNSPECIFIED TYPE: ICD-10-CM

## 2023-12-04 LAB
ALBUMIN SERPL-MCNC: 4.5 G/DL (ref 3.4–5)
ALBUMIN/GLOB SERPL: 1.7 {RATIO} (ref 1.1–2.2)
ALP SERPL-CCNC: 105 U/L (ref 40–129)
ALT SERPL-CCNC: 21 U/L (ref 10–40)
ANION GAP SERPL CALCULATED.3IONS-SCNC: 8 MMOL/L (ref 3–16)
AST SERPL-CCNC: 15 U/L (ref 15–37)
BILIRUB SERPL-MCNC: <0.2 MG/DL (ref 0–1)
BUN SERPL-MCNC: 10 MG/DL (ref 7–20)
CALCIUM SERPL-MCNC: 10 MG/DL (ref 8.3–10.6)
CHLORIDE SERPL-SCNC: 101 MMOL/L (ref 99–110)
CHOLEST SERPL-MCNC: 258 MG/DL (ref 0–199)
CO2 SERPL-SCNC: 30 MMOL/L (ref 21–32)
CREAT SERPL-MCNC: 0.6 MG/DL (ref 0.6–1.1)
GFR SERPLBLD CREATININE-BSD FMLA CKD-EPI: >60 ML/MIN/{1.73_M2}
GLUCOSE SERPL-MCNC: 84 MG/DL (ref 70–99)
HDLC SERPL-MCNC: 45 MG/DL (ref 40–60)
LDLC SERPL CALC-MCNC: 166 MG/DL
MAGNESIUM SERPL-MCNC: 2.2 MG/DL (ref 1.8–2.4)
POTASSIUM SERPL-SCNC: 4.6 MMOL/L (ref 3.5–5.1)
PROT SERPL-MCNC: 7.1 G/DL (ref 6.4–8.2)
SODIUM SERPL-SCNC: 139 MMOL/L (ref 136–145)
TRIGL SERPL-MCNC: 236 MG/DL (ref 0–150)
VLDLC SERPL CALC-MCNC: 47 MG/DL

## 2023-12-04 PROCEDURE — 83036 HEMOGLOBIN GLYCOSYLATED A1C: CPT

## 2023-12-04 PROCEDURE — 80053 COMPREHEN METABOLIC PANEL: CPT

## 2023-12-04 PROCEDURE — 36415 COLL VENOUS BLD VENIPUNCTURE: CPT

## 2023-12-04 PROCEDURE — 80061 LIPID PANEL: CPT

## 2023-12-04 PROCEDURE — 90715 TDAP VACCINE 7 YRS/> IM: CPT | Performed by: FAMILY MEDICINE

## 2023-12-04 PROCEDURE — 90471 IMMUNIZATION ADMIN: CPT | Performed by: FAMILY MEDICINE

## 2023-12-04 PROCEDURE — 99396 PREV VISIT EST AGE 40-64: CPT | Performed by: FAMILY MEDICINE

## 2023-12-04 PROCEDURE — 83735 ASSAY OF MAGNESIUM: CPT

## 2023-12-04 PROCEDURE — 99214 OFFICE O/P EST MOD 30 MIN: CPT | Performed by: FAMILY MEDICINE

## 2023-12-04 SDOH — ECONOMIC STABILITY: INCOME INSECURITY: HOW HARD IS IT FOR YOU TO PAY FOR THE VERY BASICS LIKE FOOD, HOUSING, MEDICAL CARE, AND HEATING?: NOT HARD AT ALL

## 2023-12-04 SDOH — ECONOMIC STABILITY: FOOD INSECURITY: WITHIN THE PAST 12 MONTHS, THE FOOD YOU BOUGHT JUST DIDN'T LAST AND YOU DIDN'T HAVE MONEY TO GET MORE.: NEVER TRUE

## 2023-12-04 SDOH — ECONOMIC STABILITY: FOOD INSECURITY: WITHIN THE PAST 12 MONTHS, YOU WORRIED THAT YOUR FOOD WOULD RUN OUT BEFORE YOU GOT MONEY TO BUY MORE.: NEVER TRUE

## 2023-12-04 SDOH — ECONOMIC STABILITY: HOUSING INSECURITY
IN THE LAST 12 MONTHS, WAS THERE A TIME WHEN YOU DID NOT HAVE A STEADY PLACE TO SLEEP OR SLEPT IN A SHELTER (INCLUDING NOW)?: NO

## 2023-12-04 ASSESSMENT — PATIENT HEALTH QUESTIONNAIRE - PHQ9
SUM OF ALL RESPONSES TO PHQ QUESTIONS 1-9: 0
SUM OF ALL RESPONSES TO PHQ QUESTIONS 1-9: 0
SUM OF ALL RESPONSES TO PHQ9 QUESTIONS 1 & 2: 0
SUM OF ALL RESPONSES TO PHQ QUESTIONS 1-9: 0
2. FEELING DOWN, DEPRESSED OR HOPELESS: 0
1. LITTLE INTEREST OR PLEASURE IN DOING THINGS: 0
SUM OF ALL RESPONSES TO PHQ QUESTIONS 1-9: 0

## 2023-12-04 NOTE — PATIENT INSTRUCTIONS
--Make appointment to see the eye doctor     --Make appointment to see the dentist     --Call to schedule your colonoscopy  --You can also request an appointment on their website:  [email or web address], [address]  [phone]    [facility], Pablo Burton  [address] [place] [address]   Phone: [phone]

## 2023-12-04 NOTE — PROGRESS NOTES
Immunization(s) given during visit:     Immunizations Administered       Name Date Dose Route    TDaP, ADABRAULIO (age 6y-58y), [facility] (age 10y+), IM, 0.5mL 12/4/2023 0.5 mL Intramuscular    Site: Deltoid- Right    Lot: [id number]    NDC: 78805-422-04             Patient instructed to remain in clinic for 20 minutes after injection and was advised to report any adverse reaction to me immediately.

## 2023-12-05 LAB
EST. AVERAGE GLUCOSE BLD GHB EST-MCNC: 108.3 MG/DL
HBA1C MFR BLD: 5.4 %

## 2023-12-05 RX ORDER — SEMAGLUTIDE 0.25 MG/.5ML
0.25 INJECTION, SOLUTION SUBCUTANEOUS
Qty: 0.5 ML | Refills: 0 | Status: SHIPPED | OUTPATIENT
Start: 2023-12-05

## 2023-12-06 ENCOUNTER — TELEPHONE (OUTPATIENT)
Dept: ADMINISTRATIVE | Age: 48
End: 2023-12-06

## 2023-12-06 NOTE — TELEPHONE ENCOUNTER
Submitted PA for Wegovy 0.25MG/0.5ML  Via CMM (Key: [id number]) STATUS: PENDING.    Follow up done daily; if no response in three days we will refax for status check.  If another three days goes by with no response we will call the insurance for status.

## 2023-12-11 NOTE — TELEPHONE ENCOUNTER
The medication is APPROVED FROM 12/08/23 THRU 06/08/24    If this requires a response please respond to the pool ( P MHCX PSC MEDICATION PRE-AUTH).      Thank you please advise patient.

## 2024-04-27 ENCOUNTER — HOSPITAL ENCOUNTER (OUTPATIENT)
Dept: ULTRASOUND IMAGING | Age: 49
Discharge: HOME | End: 2024-04-27
Attending: UROLOGY
Payer: COMMERCIAL

## 2024-04-27 DIAGNOSIS — N28.89 OTHER SPECIFIED DISORDERS OF KIDNEY AND URETER: ICD-10-CM

## 2024-04-27 PROCEDURE — 76770 US EXAM ABDO BACK WALL COMP: CPT

## 2024-10-28 ENCOUNTER — TELEPHONE (OUTPATIENT)
Dept: FAMILY MEDICINE CLINIC | Age: 49
End: 2024-10-28

## 2024-10-28 NOTE — TELEPHONE ENCOUNTER
----- Message from Nancy RANDLE sent at 10/28/2024 11:10 AM EDT -----  Regarding: [facility] Appointment Request  E[facility] Appointment Request    Patient needs appointment for E[facility] Appointment Type: Annual Visit.    Patient Requested Dates(s):before November 30- except from Wednesday  Patient Requested Time:any time  Provider Name:Twila Guillaume MD    Reason for Appointment Request: Established Patient - Available appointments did not meet patient need.  --------------------------------------------------------------------------------------------------------------------------    Relationship to Patient: Self     Call Back Information: OK to leave message on voicemail  Preferred Call Back Number: Phone [phone]

## 2024-11-21 ENCOUNTER — OFFICE VISIT (OUTPATIENT)
Dept: FAMILY MEDICINE CLINIC | Age: 49
End: 2024-11-21

## 2024-11-21 VITALS
WEIGHT: 197.8 LBS | DIASTOLIC BLOOD PRESSURE: 88 MMHG | TEMPERATURE: 98.2 F | SYSTOLIC BLOOD PRESSURE: 126 MMHG | HEART RATE: 92 BPM | OXYGEN SATURATION: 98 % | BODY MASS INDEX: 38.63 KG/M2

## 2024-11-21 DIAGNOSIS — K64.9 HEMORRHOIDS, UNSPECIFIED HEMORRHOID TYPE: ICD-10-CM

## 2024-11-21 DIAGNOSIS — Z23 NEED FOR INFLUENZA VACCINATION: ICD-10-CM

## 2024-11-21 DIAGNOSIS — Z00.00 ANNUAL PHYSICAL EXAM: Primary | ICD-10-CM

## 2024-11-21 DIAGNOSIS — E66.09 CLASS 2 OBESITY DUE TO EXCESS CALORIES WITHOUT SERIOUS COMORBIDITY WITH BODY MASS INDEX (BMI) OF 38.0 TO 38.9 IN ADULT: ICD-10-CM

## 2024-11-21 DIAGNOSIS — K21.9 GASTROESOPHAGEAL REFLUX DISEASE, UNSPECIFIED WHETHER ESOPHAGITIS PRESENT: ICD-10-CM

## 2024-11-21 DIAGNOSIS — R73.9 HYPERGLYCEMIA: ICD-10-CM

## 2024-11-21 DIAGNOSIS — E78.00 PURE HYPERCHOLESTEROLEMIA: ICD-10-CM

## 2024-11-21 DIAGNOSIS — E66.812 CLASS 2 OBESITY DUE TO EXCESS CALORIES WITHOUT SERIOUS COMORBIDITY WITH BODY MASS INDEX (BMI) OF 38.0 TO 38.9 IN ADULT: ICD-10-CM

## 2024-11-21 RX ORDER — SEMAGLUTIDE 0.25 MG/.5ML
0.25 INJECTION, SOLUTION SUBCUTANEOUS
Qty: 2 ML | Refills: 0 | Status: SHIPPED | OUTPATIENT
Start: 2024-11-21

## 2024-11-21 RX ORDER — HYDROCORTISONE ACETATE 25 MG/1
25 SUPPOSITORY RECTAL EVERY 12 HOURS
Qty: 14 SUPPOSITORY | Refills: 1 | Status: SHIPPED | OUTPATIENT
Start: 2024-11-21

## 2024-11-21 ASSESSMENT — PATIENT HEALTH QUESTIONNAIRE - PHQ9
SUM OF ALL RESPONSES TO PHQ QUESTIONS 1-9: 0
SUM OF ALL RESPONSES TO PHQ9 QUESTIONS 1 & 2: 0
2. FEELING DOWN, DEPRESSED OR HOPELESS: NOT AT ALL
1. LITTLE INTEREST OR PLEASURE IN DOING THINGS: NOT AT ALL

## 2024-11-21 NOTE — PROGRESS NOTES
DILATION AND CURETTAGE OF UTERUS  05/24/2012    PARTIAL NEPHRECTOMY Left 12/17/2020    ROBOTIC LAPAROSCOPIC PARTIAL LEFT NEPHRECTOMY performed by Tanmay Boston MD at [facility] OR    [facility] AND BSO (CERVIX REMOVED)  04/05/2018    total hysterectomy, BSO    TYMPANOSTOMY TUBE PLACEMENT      UPPER GASTROINTESTINAL ENDOSCOPY N/A 07/09/2020    EGD BIOPSY performed by Sly Mariee MD at [facility] ASC ENDOSCOPY     Family History   Problem Relation Age of Onset    High Blood Pressure Mother     High Cholesterol Mother     Liver Disease Mother     Heart Disease Father 41    No Known Problems Brother     No Known Problems Brother     No Known Problems Brother     Lung Cancer Maternal Grandmother 60        +smoker    Colon Cancer Maternal Grandmother     Cancer Other     Alcohol Abuse Other     Stroke Other     High Blood Pressure Other     Depression Daughter     No Known Problems Son      Social History     Socioeconomic History    Marital status:      Spouse name: Not on file    Number of children: 2    Years of education: Not on file    Highest education level: Not on file   Occupational History    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     Tobacco Use    Smoking status: Every Day     Current packs/day: 0.50     Average packs/day: 0.5 packs/day for 28.9 years (14.5 ttl pk-yrs)     Types: Cigarettes     Start date: 1996    Smokeless tobacco: Never   Vaping Use    Vaping status: Never Used   Substance and Sexual Activity    Alcohol use: Not Currently    Drug use: No    Sexual activity: Not on file   Other Topics Concern    Not on file   Social History Narrative    Not on file     Social Determinants of Health     Financial Resource Strain: Low Risk  (12/4/2023)    Overall Financial Resource Strain (CARDIA)     Difficulty of Paying Living Expenses: Not hard at all   Food Insecurity: No Food Insecurity (12/4/2023)    Hunger Vital Sign     Worried About Running Out of Food in the Last Year: Never true     Ran Out of Food in the Last Year:

## 2024-11-23 ENCOUNTER — HOSPITAL ENCOUNTER (OUTPATIENT)
Age: 49
Discharge: HOME | End: 2024-11-23
Payer: COMMERCIAL

## 2024-11-23 DIAGNOSIS — E78.00 PURE HYPERCHOLESTEROLEMIA: ICD-10-CM

## 2024-11-23 DIAGNOSIS — Z00.00 ANNUAL PHYSICAL EXAM: ICD-10-CM

## 2024-11-23 DIAGNOSIS — R73.9 HYPERGLYCEMIA: ICD-10-CM

## 2024-11-23 LAB
ALBUMIN SERPL-MCNC: 4.3 G/DL (ref 3.4–5)
ALBUMIN/GLOB SERPL: 1.7 {RATIO} (ref 1.1–2.2)
ALP SERPL-CCNC: 91 U/L (ref 40–129)
ALT SERPL-CCNC: 21 U/L (ref 10–40)
ANION GAP SERPL CALCULATED.3IONS-SCNC: 9 MMOL/L (ref 3–16)
AST SERPL-CCNC: 17 U/L (ref 15–37)
BILIRUB SERPL-MCNC: 0.3 MG/DL (ref 0–1)
BUN SERPL-MCNC: 12 MG/DL (ref 7–20)
CALCIUM SERPL-MCNC: 9.7 MG/DL (ref 8.3–10.6)
CHLORIDE SERPL-SCNC: 105 MMOL/L (ref 99–110)
CHOLEST SERPL-MCNC: 263 MG/DL (ref 0–199)
CO2 SERPL-SCNC: 26 MMOL/L (ref 21–32)
CREAT SERPL-MCNC: 0.6 MG/DL (ref 0.6–1.1)
GFR SERPLBLD CREATININE-BSD FMLA CKD-EPI: >90 ML/MIN/{1.73_M2}
GLUCOSE P FAST SERPL-MCNC: 87 MG/DL (ref 70–99)
HDLC SERPL-MCNC: 41 MG/DL (ref 40–60)
LDL CHOLESTEROL: 195 MG/DL
POTASSIUM SERPL-SCNC: 4.6 MMOL/L (ref 3.5–5.1)
PROT SERPL-MCNC: 6.9 G/DL (ref 6.4–8.2)
SODIUM SERPL-SCNC: 140 MMOL/L (ref 136–145)
TRIGL SERPL-MCNC: 137 MG/DL (ref 0–150)
VLDLC SERPL CALC-MCNC: 27 MG/DL

## 2024-11-23 PROCEDURE — 83036 HEMOGLOBIN GLYCOSYLATED A1C: CPT

## 2024-11-23 PROCEDURE — 36415 COLL VENOUS BLD VENIPUNCTURE: CPT

## 2024-11-23 PROCEDURE — 80053 COMPREHEN METABOLIC PANEL: CPT

## 2024-11-23 PROCEDURE — 80061 LIPID PANEL: CPT

## 2024-11-24 LAB
EST. AVERAGE GLUCOSE BLD GHB EST-MCNC: 111.2 MG/DL
HBA1C MFR BLD: 5.5 %

## 2024-11-25 DIAGNOSIS — Z82.49 FAMILY HISTORY OF EARLY CAD: ICD-10-CM

## 2024-11-25 DIAGNOSIS — E78.00 PURE HYPERCHOLESTEROLEMIA: Primary | ICD-10-CM

## 2024-12-02 ENCOUNTER — TELEPHONE (OUTPATIENT)
Dept: ADMINISTRATIVE | Age: 49
End: 2024-12-02

## 2024-12-02 NOTE — TELEPHONE ENCOUNTER
Submitted PA for Semaglutide-Weight Management (WEGOVY) 0.25 MG/0.5ML SOAJ SC injection   Via CMM (Key: [id number]) STATUS: PENDING.    Follow up done daily; if no decision with in three days we will refax.  If another three days goes by with no decision will call the insurance for status.

## 2024-12-03 NOTE — TELEPHONE ENCOUNTER
The medication is APPROVED THRU 06/02/2025    If this requires a response please respond to the pool ( P MHCX PSC MEDICATION PRE-AUTH).      Thank you please advise patient.

## 2024-12-07 ENCOUNTER — HOSPITAL ENCOUNTER (OUTPATIENT)
Dept: CT IMAGING | Age: 49
Discharge: HOME | End: 2024-12-07
Payer: COMMERCIAL

## 2024-12-07 DIAGNOSIS — Z82.49 FAMILY HISTORY OF EARLY CAD: ICD-10-CM

## 2024-12-07 DIAGNOSIS — E78.00 PURE HYPERCHOLESTEROLEMIA: ICD-10-CM

## 2024-12-07 PROCEDURE — 75571 CT HRT W/O DYE W/CA TEST: CPT

## 2025-01-01 DIAGNOSIS — E66.812 CLASS 2 OBESITY DUE TO EXCESS CALORIES WITHOUT SERIOUS COMORBIDITY WITH BODY MASS INDEX (BMI) OF 38.0 TO 38.9 IN ADULT: ICD-10-CM

## 2025-01-01 DIAGNOSIS — E66.09 CLASS 2 OBESITY DUE TO EXCESS CALORIES WITHOUT SERIOUS COMORBIDITY WITH BODY MASS INDEX (BMI) OF 38.0 TO 38.9 IN ADULT: ICD-10-CM

## 2025-01-01 DIAGNOSIS — K64.9 HEMORRHOIDS, UNSPECIFIED HEMORRHOID TYPE: ICD-10-CM

## 2025-01-02 RX ORDER — SEMAGLUTIDE 0.25 MG/.5ML
INJECTION, SOLUTION SUBCUTANEOUS
Qty: 2 ML | Refills: 0 | Status: SHIPPED | OUTPATIENT
Start: 2025-01-02

## 2025-01-02 RX ORDER — HYDROCORTISONE ACETATE 25 MG
SUPPOSITORY, RECTAL RECTAL
Qty: 14 SUPPOSITORY | Refills: 1 | Status: SHIPPED | OUTPATIENT
Start: 2025-01-02

## 2025-01-30 DIAGNOSIS — E66.812 CLASS 2 OBESITY DUE TO EXCESS CALORIES WITHOUT SERIOUS COMORBIDITY WITH BODY MASS INDEX (BMI) OF 38.0 TO 38.9 IN ADULT: ICD-10-CM

## 2025-01-30 DIAGNOSIS — E66.09 CLASS 2 OBESITY DUE TO EXCESS CALORIES WITHOUT SERIOUS COMORBIDITY WITH BODY MASS INDEX (BMI) OF 38.0 TO 38.9 IN ADULT: ICD-10-CM

## 2025-01-31 RX ORDER — SEMAGLUTIDE 0.25 MG/.5ML
INJECTION, SOLUTION SUBCUTANEOUS
Qty: 2 ML | Refills: 0 | Status: SHIPPED | OUTPATIENT
Start: 2025-01-31

## 2025-02-17 ENCOUNTER — OFFICE VISIT (OUTPATIENT)
Dept: FAMILY MEDICINE CLINIC | Age: 50
End: 2025-02-17
Payer: COMMERCIAL

## 2025-02-17 VITALS
OXYGEN SATURATION: 98 % | TEMPERATURE: 99 F | SYSTOLIC BLOOD PRESSURE: 120 MMHG | DIASTOLIC BLOOD PRESSURE: 84 MMHG | HEART RATE: 93 BPM | WEIGHT: 189 LBS | BODY MASS INDEX: 36.91 KG/M2

## 2025-02-17 DIAGNOSIS — E66.812 CLASS 2 OBESITY DUE TO EXCESS CALORIES WITHOUT SERIOUS COMORBIDITY WITH BODY MASS INDEX (BMI) OF 36.0 TO 36.9 IN ADULT: Primary | ICD-10-CM

## 2025-02-17 DIAGNOSIS — K64.9 HEMORRHOIDS, UNSPECIFIED HEMORRHOID TYPE: ICD-10-CM

## 2025-02-17 DIAGNOSIS — E66.09 CLASS 2 OBESITY DUE TO EXCESS CALORIES WITHOUT SERIOUS COMORBIDITY WITH BODY MASS INDEX (BMI) OF 36.0 TO 36.9 IN ADULT: Primary | ICD-10-CM

## 2025-02-17 PROCEDURE — 99214 OFFICE O/P EST MOD 30 MIN: CPT | Performed by: FAMILY MEDICINE

## 2025-02-17 PROCEDURE — G8417 CALC BMI ABV UP PARAM F/U: HCPCS | Performed by: FAMILY MEDICINE

## 2025-02-17 PROCEDURE — G8427 DOCREV CUR MEDS BY ELIG CLIN: HCPCS | Performed by: FAMILY MEDICINE

## 2025-02-17 PROCEDURE — 4004F PT TOBACCO SCREEN RCVD TLK: CPT | Performed by: FAMILY MEDICINE

## 2025-02-17 RX ORDER — OMEPRAZOLE 20 MG/1
20 CAPSULE, DELAYED RELEASE ORAL
COMMUNITY
Start: 2025-02-17

## 2025-02-17 RX ORDER — PSYLLIUM HUSK 0.4 G
400 CAPSULE ORAL DAILY
Qty: 30 CAPSULE | Refills: 5 | Status: SHIPPED | OUTPATIENT
Start: 2025-02-17

## 2025-02-17 NOTE — PATIENT INSTRUCTIONS
STARTING GLP1/GIP MEDICATIONS   (Semeglutide, dulaglutide. Tirzepatide)    - These medications may cause a significant decrease in appetite. Make sure you are not missing meals! You may need to eat several smaller meals throughout the day.  - Recommend taking a multivitamin daily.  - Nausea is a common side effect. Avoiding large heavy meals can help. Drinking cold water can also be helpful. If the nausea is severe you need to let us know.  - Constipation is a common side effect. Make sure you are drinking plenty of fluids and getting fiber in your diet. You can use Miralax if needed.  - Muscle mass can decrease quickly with weight loss. Recommend a diet high in protein and regular exercise that includes a weight/strength program.  - Pancreatitis is a rare but serious side effect. Symptoms include severe upper abdominal pain, usually in the middle that sometimes goes through to your back. Often people have nausea and vomiting with it. If these symptoms occur you need to stop the medication and let us know immediately.

## 2025-02-17 NOTE — PROGRESS NOTES
mass index is 36.91 kg/m².      11/21/2024     1:58 PM 12/4/2023     1:12 PM 2/4/2021     3:37 PM 12/1/2020    12:26 PM 11/21/2019    12:37 PM 3/5/2015     2:10 PM   PHQ Scores   PHQ2 Score 0 0 0 3 0 0   PHQ9 Score 0 0 0 10 0 0       Interpretation of Total Score Depression Severity: 1-4 = Minimal depression, 5-9 = Mild depression, 10-14 = Moderate depression, 15-19 = Moderately severe depression, 20-27 = Severe depression       GEN: Alert and oriented x 4 NAD, obese, well hydrated, well developed.          ASSESSMENT AND PLAN:       Amelia was seen today for weight management.    Diagnoses and all orders for this visit:    Class 2 obesity due to excess calories without serious comorbidity with body mass index (BMI) of 36.0 to 36.9 in adult  -     Semaglutide-Weight Management (WEGOVY) 0.5 MG/0.5ML SOAJ SC injection; Inject 0.5 mg into the skin every 7 days  - pt has lost 8 pounds since last appointment in late November, tolerating side effects of medication  - discussed increasing to Wegovy 0.5mg  - encouraged healthy diet/exercise including strength training; continue daily vitamins  - discussed starting a fiber supplement  - start taking omeprazole 20mg daily for heartburn    Hemorrhoids, unspecified hemorrhoid type  -     Satya Estrada MD, Colorectal Surgery, [facility]  - symptoms not well controlled  - sending referral to colorectal surgery  - discussed starting fiber supplement, increasing colace for more regular bowel regimen    Other orders  -     omeprazole (PRILOSEC) 20 MG delayed release capsule; Take 1 capsule by mouth every morning (before breakfast)  -     psyllium (FT FIBER SUPPLEMENT) 400 MG capsule; Take 1 capsule by mouth daily          Return in about 2 months (around 4/17/2025) for 30 min, Weight.         Note per Reena Kwok,   MS3  student with corrections and edits per Twila Guillaume MD.  I agree with entirety of note and was present and performed history and physical.  I also

## 2025-02-18 ENCOUNTER — TELEPHONE (OUTPATIENT)
Dept: ADMINISTRATIVE | Age: 50
End: 2025-02-18

## 2025-02-18 NOTE — TELEPHONE ENCOUNTER
Submitted PA for Wegovy 0.5MG/0.5ML auto-injectors  Via CMM (Key: [id number]) STATUS: PENDING.    Follow up done daily; if no decision with in three days we will refax.  If another three days goes by with no decision will call the insurance for status.

## 2025-02-19 NOTE — TELEPHONE ENCOUNTER
This medication or product was previously approved on [id number] from 2024-12-02 to 2025-06-02. **Please note: This request was submitted electronically.

## 2025-03-25 ENCOUNTER — OFFICE VISIT (OUTPATIENT)
Dept: SURGERY | Age: 50
End: 2025-03-25
Payer: COMMERCIAL

## 2025-03-25 VITALS
SYSTOLIC BLOOD PRESSURE: 121 MMHG | TEMPERATURE: 98.3 F | OXYGEN SATURATION: 98 % | BODY MASS INDEX: 37.5 KG/M2 | HEIGHT: 59 IN | DIASTOLIC BLOOD PRESSURE: 84 MMHG | HEART RATE: 97 BPM | WEIGHT: 186 LBS

## 2025-03-25 DIAGNOSIS — K64.2 GRADE III HEMORRHOIDS: Primary | ICD-10-CM

## 2025-03-25 DIAGNOSIS — F17.200 SMOKER: ICD-10-CM

## 2025-03-25 PROCEDURE — 4004F PT TOBACCO SCREEN RCVD TLK: CPT | Performed by: SURGERY

## 2025-03-25 PROCEDURE — G8417 CALC BMI ABV UP PARAM F/U: HCPCS | Performed by: SURGERY

## 2025-03-25 PROCEDURE — 99204 OFFICE O/P NEW MOD 45 MIN: CPT | Performed by: SURGERY

## 2025-03-25 PROCEDURE — G8427 DOCREV CUR MEDS BY ELIG CLIN: HCPCS | Performed by: SURGERY

## 2025-03-25 NOTE — PROGRESS NOTES
[facility] PHYSICIANS [place] [facility] COLORECTAL SURGERY  [address] [place] [address]  [address]  [place] [address]  Dept: [phone]  Dept Fax: [phone]  Loc: [phone]    Visit Date: 3/25/2025    Amelia Mccarty is a 49 y.o. female who presents today for: New Patient (hemorrhoids)      HPI:       Amelia Mccarty is a 49 y.o. female referred by Dr. Guillaume for hemorrhoids.    Patient has had several months of hemorrhoids symptoms.   Main complaints include: bleeding, prolapse, mucus  Previous treatment: no  Also with constipation    Past Medical History:   Diagnosis Date    Gastroparesis     GERD (gastroesophageal reflux disease)     H/O myringotomy     w/tube placement     Renal mass, left 12/17/2020    Sleep apnea syndrome     no c-pap    Tobacco use disorder        Social History:   Social History     Tobacco Use    Smoking status: Every Day     Current packs/day: 0.50     Average packs/day: 0.5 packs/day for 29.2 years (14.6 ttl pk-yrs)     Types: Cigarettes     Start date: 1996    Smokeless tobacco: Never   Substance Use Topics    Alcohol use: Not Currently      Tobacco cessation counseling provided as appropriate.    Date of last Colonoscopy: 5/17/2024   No cologuard on file  No FIT/FOBT on file   No flexible sigmoidoscopy on file    Objective:     Physical Exam   /84   Pulse 97   Temp 98.3 °F (36.8 °C) (Infrared)   Ht 1.499 m (4' 11\")   Wt 84.4 kg (186 lb)   LMP 03/29/2018   SpO2 98%   BMI 37.57 kg/m²   Constitutional: Appears well-developed and well-nourished. Grooming appropriate. No gross deformities. Body mass index is 37.57 kg/m².    Anorectal exam:    ANOSCOPY:    Chaperone/MA present in room during entire exam. Patient was placed in left lateral down or knee-chest positioning depending on patient comfort. Exam table manipulated for proper visualization and lighting. Buttocks spread.     Inspection reveals: no perianal skin lesions, excoriation. External

## 2025-04-08 ENCOUNTER — OFFICE VISIT (OUTPATIENT)
Dept: SURGERY | Age: 50
End: 2025-04-08
Payer: COMMERCIAL

## 2025-04-08 VITALS
HEART RATE: 105 BPM | WEIGHT: 185 LBS | DIASTOLIC BLOOD PRESSURE: 81 MMHG | SYSTOLIC BLOOD PRESSURE: 117 MMHG | BODY MASS INDEX: 37.37 KG/M2 | RESPIRATION RATE: 16 BRPM | OXYGEN SATURATION: 96 %

## 2025-04-08 DIAGNOSIS — K64.2 GRADE III HEMORRHOIDS: Primary | ICD-10-CM

## 2025-04-08 PROCEDURE — 46221 LIGATION OF HEMORRHOID(S): CPT | Performed by: SURGERY

## 2025-04-08 NOTE — PROGRESS NOTES
INTERNAL HEMORRHOID RUBBER BAND LIGATION PROCEDURE NOTE:    Patient presented with symptomatic internal hemorrhoids unresponsive to conservative management. See previous office notes for details of previous history and treatments.     Risks of rubber band ligation explained to patient, including but not limited to: immediate and delayed bleeding, pain, infection, external hemorrhoids thrombosis, pelvic sepsis, urinary retention, sphincter dysfunction, need for additional procedures, and recurrence. Patient was previously provided with information in office AVS (after visit summary) and given opportunity to ask any questions and bring up any concerns.     Chaperone/MA present in room during entire procedure.    Patient was placed in either lateral or knee-chest positioning depending on patient preference. Exam table manipulated for proper visualization and lighting. Buttocks spread. Digital exam and anoscopy performed confirming internal hemorrhoids.    Suction rubber band ligator used to place band in 3 positions, 1 cm proximal to the dentate line, at the apex of the internal hemorrhoid.    Anoscope removed. Patient tolerated the procedure well without complication. EBL minimal. Post procedure expectations and instructions explained to patient. Written instructions provided as well.    Disposition: Follow up in 4 weeks for symptom reassessment.    Electronically signed by Satya Samayoa MD on 4/8/2025 at 2:45 PM

## 2025-04-25 NOTE — PROGRESS NOTES
Here for follow up weight loss.  Patient has been taking Wegovy/Ozempic. Is tolerating medication with side effects.  Wants to discuss changing the Omeprazole, said that it isn't helping with the heartburn    Started medication on 12/5/24.      Last Weight Metrics:      4/28/2025     3:33 PM 4/8/2025     2:37 PM 3/25/2025     2:46 PM 2/17/2025     3:58 PM 11/21/2024     1:59 PM 12/4/2023     1:14 PM 12/16/2021     3:37 PM   Weight Loss Metrics   Height   4' 11\"   5' 0\" 4' 11\"   Weight - Scale 186 lbs 185 lbs 186 lbs 189 lbs 197 lbs 13 oz 199 lbs 6 oz 195 lbs   BMI (Calculated) 0 kg/m2 0 kg/m2 37.6 kg/m2 0 kg/m2 0 kg/m2 39 kg/m2 39.5 kg/m2   Feels like clothes fitting better.     Diet:  none  specific.  Eating smaller amounts. Forgot to take for 2 weeks and noticed increased appetite  Exercise:  daily, walking - active at work, works in nursing home. Going to try to get out and walk more as weather gets nice.   Barriers: None  Constipation - taking fiber supplement, has colace, hasn't done miralax yet. States goes every other day, soft and good volume. Only gets nausea if gets backed up.   Taking MVI and probiotic      Has been having more heartburn, taking 40mg and helps, no sx with 40mg. No difficulty swallowing.       Having more migraines, thinks bc brother who is living with her smokes THC and the smell triggers them. Getting twice a week, takes aleve and helps but doesn't get rid of them. Also ice and towel. States feels like has constant HA all the time but severe         Vitals:    04/28/25 1533   BP: 110/74   BP Site: Left Upper Arm   Patient Position: Sitting   BP Cuff Size: Large Adult   Pulse: 91   Temp: 99.3 °F (37.4 °C)   TempSrc: Infrared   SpO2: 98%   Weight: 84.4 kg (186 lb)     Body mass index is 37.57 kg/m².      4/28/2025     3:32 PM 11/21/2024     1:58 PM 12/4/2023     1:12 PM 2/4/2021     3:37 PM 12/1/2020    12:26 PM 11/21/2019    12:37 PM 3/5/2015     2:10 PM   PHQ Scores   PHQ2 Score 0 0 0 0

## 2025-04-28 ENCOUNTER — OFFICE VISIT (OUTPATIENT)
Dept: FAMILY MEDICINE CLINIC | Age: 50
End: 2025-04-28
Payer: COMMERCIAL

## 2025-04-28 VITALS
DIASTOLIC BLOOD PRESSURE: 74 MMHG | TEMPERATURE: 99.3 F | WEIGHT: 186 LBS | OXYGEN SATURATION: 98 % | BODY MASS INDEX: 37.57 KG/M2 | HEART RATE: 91 BPM | SYSTOLIC BLOOD PRESSURE: 110 MMHG

## 2025-04-28 DIAGNOSIS — G43.011 INTRACTABLE MIGRAINE WITHOUT AURA AND WITH STATUS MIGRAINOSUS: ICD-10-CM

## 2025-04-28 DIAGNOSIS — K21.9 GASTROESOPHAGEAL REFLUX DISEASE, UNSPECIFIED WHETHER ESOPHAGITIS PRESENT: ICD-10-CM

## 2025-04-28 DIAGNOSIS — E66.812 CLASS 2 SEVERE OBESITY DUE TO EXCESS CALORIES WITH SERIOUS COMORBIDITY AND BODY MASS INDEX (BMI) OF 37.0 TO 37.9 IN ADULT (HCC): Primary | ICD-10-CM

## 2025-04-28 DIAGNOSIS — E66.01 CLASS 2 SEVERE OBESITY DUE TO EXCESS CALORIES WITH SERIOUS COMORBIDITY AND BODY MASS INDEX (BMI) OF 37.0 TO 37.9 IN ADULT (HCC): Primary | ICD-10-CM

## 2025-04-28 PROCEDURE — G8427 DOCREV CUR MEDS BY ELIG CLIN: HCPCS | Performed by: FAMILY MEDICINE

## 2025-04-28 PROCEDURE — G8417 CALC BMI ABV UP PARAM F/U: HCPCS | Performed by: FAMILY MEDICINE

## 2025-04-28 PROCEDURE — 99214 OFFICE O/P EST MOD 30 MIN: CPT | Performed by: FAMILY MEDICINE

## 2025-04-28 PROCEDURE — 4004F PT TOBACCO SCREEN RCVD TLK: CPT | Performed by: FAMILY MEDICINE

## 2025-04-28 RX ORDER — SUMATRIPTAN SUCCINATE 100 MG/1
TABLET ORAL
Qty: 12 TABLET | Refills: 3 | Status: SHIPPED | OUTPATIENT
Start: 2025-04-28

## 2025-04-28 RX ORDER — OMEPRAZOLE 40 MG/1
40 CAPSULE, DELAYED RELEASE ORAL
Qty: 30 CAPSULE | Refills: 5 | Status: SHIPPED | OUTPATIENT
Start: 2025-04-28

## 2025-04-28 SDOH — ECONOMIC STABILITY: FOOD INSECURITY: WITHIN THE PAST 12 MONTHS, THE FOOD YOU BOUGHT JUST DIDN'T LAST AND YOU DIDN'T HAVE MONEY TO GET MORE.: NEVER TRUE

## 2025-04-28 SDOH — ECONOMIC STABILITY: FOOD INSECURITY: WITHIN THE PAST 12 MONTHS, YOU WORRIED THAT YOUR FOOD WOULD RUN OUT BEFORE YOU GOT MONEY TO BUY MORE.: NEVER TRUE

## 2025-04-28 ASSESSMENT — PATIENT HEALTH QUESTIONNAIRE - PHQ9
1. LITTLE INTEREST OR PLEASURE IN DOING THINGS: NOT AT ALL
SUM OF ALL RESPONSES TO PHQ QUESTIONS 1-9: 0
SUM OF ALL RESPONSES TO PHQ QUESTIONS 1-9: 0
2. FEELING DOWN, DEPRESSED OR HOPELESS: NOT AT ALL
SUM OF ALL RESPONSES TO PHQ QUESTIONS 1-9: 0
SUM OF ALL RESPONSES TO PHQ QUESTIONS 1-9: 0

## 2025-04-28 NOTE — PATIENT INSTRUCTIONS
GLP1/GIP MEDICATIONS   (Semeglutide, dulaglutide. Tirzepatide)    - These medications may cause a significant decrease in appetite. Make sure you are not missing meals! You may need to eat several smaller meals throughout the day.  - Recommend taking a multivitamin daily.  - Nausea is a common side effect. Avoiding large heavy meals can help. Drinking cold water can also be helpful. If the nausea is severe you need to let us know.  - Constipation is a common side effect. Make sure you are drinking plenty of fluids and getting fiber in your diet. You can use Miralax if needed.  - Muscle mass can decrease quickly with weight loss. Recommend a diet high in protein and regular exercise that includes a weight/strength program.  - Pancreatitis is a rare but serious side effect. Symptoms include severe upper abdominal pain, usually in the middle that sometimes goes through to your back. Often people have nausea and vomiting with it. If these symptoms occur you need to stop the medication and let us know immediately.

## 2025-06-26 DIAGNOSIS — E66.01 CLASS 2 SEVERE OBESITY DUE TO EXCESS CALORIES WITH SERIOUS COMORBIDITY AND BODY MASS INDEX (BMI) OF 37.0 TO 37.9 IN ADULT (HCC): ICD-10-CM

## 2025-06-26 DIAGNOSIS — E66.812 CLASS 2 SEVERE OBESITY DUE TO EXCESS CALORIES WITH SERIOUS COMORBIDITY AND BODY MASS INDEX (BMI) OF 37.0 TO 37.9 IN ADULT (HCC): ICD-10-CM

## 2025-06-27 RX ORDER — SEMAGLUTIDE 1 MG/.5ML
INJECTION, SOLUTION SUBCUTANEOUS
Qty: 2 ML | Refills: 1 | Status: SHIPPED | OUTPATIENT
Start: 2025-06-27

## 2025-07-01 ENCOUNTER — TELEPHONE (OUTPATIENT)
Dept: ADMINISTRATIVE | Age: 50
End: 2025-07-01

## 2025-07-07 ENCOUNTER — OFFICE VISIT (OUTPATIENT)
Dept: FAMILY MEDICINE CLINIC | Age: 50
End: 2025-07-07
Payer: COMMERCIAL

## 2025-07-07 VITALS
HEART RATE: 96 BPM | DIASTOLIC BLOOD PRESSURE: 80 MMHG | OXYGEN SATURATION: 98 % | BODY MASS INDEX: 35.79 KG/M2 | SYSTOLIC BLOOD PRESSURE: 110 MMHG | TEMPERATURE: 96.6 F | WEIGHT: 177.2 LBS

## 2025-07-07 DIAGNOSIS — K21.9 GASTROESOPHAGEAL REFLUX DISEASE, UNSPECIFIED WHETHER ESOPHAGITIS PRESENT: ICD-10-CM

## 2025-07-07 DIAGNOSIS — G43.009 MIGRAINE WITHOUT AURA AND WITHOUT STATUS MIGRAINOSUS, NOT INTRACTABLE: ICD-10-CM

## 2025-07-07 DIAGNOSIS — E66.812 CLASS 2 SEVERE OBESITY DUE TO EXCESS CALORIES WITH SERIOUS COMORBIDITY AND BODY MASS INDEX (BMI) OF 35.0 TO 35.9 IN ADULT (HCC): Primary | ICD-10-CM

## 2025-07-07 DIAGNOSIS — E66.01 CLASS 2 SEVERE OBESITY DUE TO EXCESS CALORIES WITH SERIOUS COMORBIDITY AND BODY MASS INDEX (BMI) OF 35.0 TO 35.9 IN ADULT (HCC): Primary | ICD-10-CM

## 2025-07-07 PROCEDURE — G8417 CALC BMI ABV UP PARAM F/U: HCPCS | Performed by: FAMILY MEDICINE

## 2025-07-07 PROCEDURE — 99214 OFFICE O/P EST MOD 30 MIN: CPT | Performed by: FAMILY MEDICINE

## 2025-07-07 PROCEDURE — G8427 DOCREV CUR MEDS BY ELIG CLIN: HCPCS | Performed by: FAMILY MEDICINE

## 2025-07-07 PROCEDURE — 4004F PT TOBACCO SCREEN RCVD TLK: CPT | Performed by: FAMILY MEDICINE

## 2025-07-07 NOTE — PROGRESS NOTES
Here for follow up weight loss.  Patient has been taking Wegovy/Ozempic. Is tolerating medication without side effects.         Last Weight Metrics:      7/7/2025     4:01 PM 4/28/2025     3:33 PM 4/8/2025     2:37 PM 3/25/2025     2:46 PM 2/17/2025     3:58 PM 11/21/2024     1:59 PM 12/4/2023     1:14 PM   Weight Loss Metrics   Height    4' 11\"   5' 0\"   Weight - Scale 177 lbs 3 oz 186 lbs 185 lbs 186 lbs 189 lbs 197 lbs 13 oz 199 lbs 6 oz   BMI (Calculated) 0 kg/m2 0 kg/m2 0 kg/m2 37.6 kg/m2 0 kg/m2 0 kg/m2 39 kg/m2       Diet: cutting carbs,   Exercise:  , activities of daily living only  Barriers: working 50-60 hrs a week      Migraine -   History of Present Illness  The patient presents for weight management, headaches, and heartburn.    Weight Management  - Reports gradual weight loss attributed to the use of Wegovy  - No side effects experienced from this medication  - Diet modified to include fewer carbohydrates and more chicken  - Finds it challenging to reduce carbohydrate intake due to fondness for carbs but is making gradual changes  - Despite working 50 to 60 hours per week and being on her feet most of the time, regular exercise has not been incorporated into her routine  - Acknowledges the need for strength training but finds it difficult to allocate time for it  - Eating habits have changed, with only one meal a day being consumed and supplemented with protein shakes in the morning and afternoon  - Decrease in clothing size, particularly in bras and pants, believed to be a result of weight loss  - Currently using Wegovy 1 mg    Headaches  - Continues to experience headaches, believed to be stress-induced due to her brother's use of THC in the house.   - Imitrex has been effective in managing these headaches, with usage estimated at approximately once a week  - No refill of this medication has been required since the last prescription    Heartburn  - Stomach issues have improved with the use of

## 2025-07-07 NOTE — TELEPHONE ENCOUNTER
Prior authorization is not required at this time. If there are any questions regarding the processing of the prescription, please contact [employer] at [phone]. The member may contact Member Services by calling the number on the back of their ID Card if additional information is needed. Reviewed by: [facility]     Please notify patient. Thank you.

## 2025-08-31 DIAGNOSIS — E66.01 CLASS 2 SEVERE OBESITY DUE TO EXCESS CALORIES WITH SERIOUS COMORBIDITY AND BODY MASS INDEX (BMI) OF 37.0 TO 37.9 IN ADULT (HCC): ICD-10-CM

## 2025-08-31 DIAGNOSIS — E66.812 CLASS 2 SEVERE OBESITY DUE TO EXCESS CALORIES WITH SERIOUS COMORBIDITY AND BODY MASS INDEX (BMI) OF 37.0 TO 37.9 IN ADULT (HCC): ICD-10-CM

## 2025-09-02 RX ORDER — SEMAGLUTIDE 1 MG/.5ML
INJECTION, SOLUTION SUBCUTANEOUS
Qty: 2 ML | Refills: 3 | Status: SHIPPED | OUTPATIENT
Start: 2025-09-02

## (undated) DEVICE — Device

## (undated) DEVICE — CLIP SURG L8MM M L POLYGLY ACID POLYGLY LAP ABSRB RAD

## (undated) DEVICE — TOTAL TRAY, DB, 100% SILI FOLEY, 16FR 10: Brand: MEDLINE

## (undated) DEVICE — TIP COVER ACCESSORY

## (undated) DEVICE — ARM DRAPE

## (undated) DEVICE — ROBOTIC PK

## (undated) DEVICE — ANCHOR TISSUE RETRIEVAL SYSTEM, BAG SIZE 125 ML, PORT SIZE 8 MM: Brand: ANCHOR TISSUE RETRIEVAL SYSTEM

## (undated) DEVICE — LIGHT HANDLE: Brand: DEVON

## (undated) DEVICE — MERCY FAIRFIELD TURNOVER KIT: Brand: MEDLINE INDUSTRIES, INC.

## (undated) DEVICE — SPONGE LAP W18XL18IN WHT COT 4 PLY FLD STRUNG RADPQ DISP ST

## (undated) DEVICE — STERILE POLYISOPRENE POWDER-FREE SURGICAL GLOVES: Brand: PROTEXIS

## (undated) DEVICE — GOWN,AURORA,NONREINF,RAGLAN,XXL,STERILE: Brand: MEDLINE

## (undated) DEVICE — INDICATED FOR USE DURING OPEN AND LAPAROSCOPIC CHOLECYSTECTOMY PROCEDURES TO INJECT RADIOPAQUE MEDIA THROUGH THE CYSTIC DUCT INTO THE BILIARY TREE.: Brand: AEROSTAT®

## (undated) DEVICE — STERILE LATEX POWDER FREE SURGICAL GLOVES WITH HYDROGEL COATING: Brand: PROTEXIS

## (undated) DEVICE — SUTURE PDS II SZ 0 L36IN ABSRB VLT L36MM CT-1 1/2 CIR Z346H

## (undated) DEVICE — MOUTHPIECE ENDOSCP L CTRL OPN AND SIDE PORTS DISP

## (undated) DEVICE — MEDIUM-LARGE CLIP APPLIER: Brand: ENDOWRIST

## (undated) DEVICE — DRAPE,LAP,CHOLE,W/TROUGHS,STERILE: Brand: MEDLINE

## (undated) DEVICE — TRAP SPEC RETRV CLR PLAS POLYP IN LN SUCT QUIK CTCH

## (undated) DEVICE — PENCIL ES L3M BTTN SWCH S STL HEX LOK BLDE ELECTRD HOLSTER

## (undated) DEVICE — LAPAROSCOPIC SCISSORS: Brand: EPIX LAPAROSCOPIC SCISSORS

## (undated) DEVICE — TROCAR: Brand: KII® SLEEVE

## (undated) DEVICE — TROCAR: Brand: KII FIOS FIRST ENTRY

## (undated) DEVICE — APPLIER CLP M/L SHFT DIA5MM 15 LIG LIGAMAX 5

## (undated) DEVICE — 30977 SEE SHARP - ENHANCED INTRAOPERATIVE LAPAROSCOPE CLEANING & DEFOGGING: Brand: 30977 SEE SHARP - ENHANCED INTRAOPERATIVE LAPAROSCOPE CLEANING & DEFOGGING

## (undated) DEVICE — SYRINGE MED 30ML STD CLR PLAS LUERLOCK TIP N CTRL DISP

## (undated) DEVICE — DRAPE C ARM UNIV W41XL74IN CLR PLAS XR VELC CLSR POLY STRP

## (undated) DEVICE — SNARE ENDOSCP L240CM SHTH DIA24MM LOOP W10MM POLYP RND REINF

## (undated) DEVICE — COLLECTOR SPEC RAYON LIQ STUART DBL FOR THRT VAG SKIN WND

## (undated) DEVICE — COLUMN DRAPE

## (undated) DEVICE — SYRINGE MED 10ML TRNSLUC BRL PLUNG BLK MRK POLYPR CTRL

## (undated) DEVICE — BW-412T DISP COMBO CLEANING BRUSH: Brand: SINGLE USE COMBINATION CLEANING BRUSH

## (undated) DEVICE — SUTURE VCRL SZ 4-0 L18IN ABSRB UD L19MM PS-2 3/8 CIR PRIM J496H

## (undated) DEVICE — LAPAROSCOPY PACK: Brand: MEDLINE INDUSTRIES, INC.

## (undated) DEVICE — SOLUTION IV IRRIG WATER 500ML POUR BRL ST 2F7113

## (undated) DEVICE — SUTURE SZ 0 27IN 5/8 CIR UR-6  TAPER PT VIOLET ABSRB VICRYL J603H

## (undated) DEVICE — FORCEPS BX L240CM WRK CHN 2.8MM STD CAP W/ NDL MIC MESH

## (undated) DEVICE — TISSUE RETRIEVAL SYSTEM: Brand: INZII RETRIEVAL SYSTEM

## (undated) DEVICE — TROCARS: Brand: KII® BALLOON BLUNT TIP SYSTEM

## (undated) DEVICE — SOLUTION IV IRRIG POUR BRL 0.9% SODIUM CHL 2F7124

## (undated) DEVICE — CHLORAPREP 26ML ORANGE

## (undated) DEVICE — SEAL

## (undated) DEVICE — PROTECTOR EYE PT SELF ADH NS OPT GRD LF

## (undated) DEVICE — SUTURE VCRL SZ 3-0 L27IN ABSRB UD L26MM SH 1/2 CIR J416H

## (undated) DEVICE — 3M™ STERI-DRAPE™ INCISE DRAPE 1050 (60CM X 45CM): Brand: STERI-DRAPE™

## (undated) DEVICE — Z INACTIVE USE 2641839 CLIP INT M L POLYMER LOK LIG HEM O LOK

## (undated) DEVICE — SET VLV 3 PC AWS DISPOSABLE GRDIAN SCOPEVALET

## (undated) DEVICE — BLADELESS OBTURATOR: Brand: WECK VISTA

## (undated) DEVICE — SWAB CULT SGL AMIES W/O CHAR FOR THRT VAG SKIN HRT CULTSWAB

## (undated) DEVICE — PMI DISPOSABLE PUNCTURE CLOSURE DEVICE / SUTURE GRASPER: Brand: PMI

## (undated) DEVICE — PROCEDURE KIT ENDOSCP CUST

## (undated) DEVICE — COVER,MAYO STAND,STERILE: Brand: MEDLINE

## (undated) DEVICE — BLANKET WRM W40.2XL55.9IN IORT LO BODY + MISTRAL AIR

## (undated) DEVICE — ADHESIVE SKIN CLSR 0.7ML TOP DERMBND ADV

## (undated) DEVICE — TRI-LUMEN FILTERED TUBE SET WITH ACTIVATED CHARCOAL FILTER: Brand: AIRSEAL

## (undated) DEVICE — CANNULA SEAL

## (undated) DEVICE — REDUCER: Brand: ENDOWRIST

## (undated) DEVICE — COVER LT HNDL BLU PLAS

## (undated) DEVICE — INSUFFLATION NEEDLE TO ESTABLISH PNEUMOPERITONEUM.: Brand: INSUFFLATION NEEDLE

## (undated) DEVICE — AIRSEAL 12 MM ACCESS PORT AND PALM GRIP OBTURATOR WITH BLADELESS OPTICAL TIP, 120 MM LENGTH: Brand: AIRSEAL

## (undated) DEVICE — CORD ES L10FT MPLR LAP

## (undated) DEVICE — SHEET,DRAPE,53X77,STERILE: Brand: MEDLINE

## (undated) DEVICE — SUTURE DEV SZ 2-0 WND CLSR ABSRB GS-22 VLOC COVIDIEN VLOCM2145

## (undated) DEVICE — HYPODERMIC SAFETY NEEDLE: Brand: MAGELLAN

## (undated) DEVICE — SUTURE ETHBND EXCEL SZ 0 L18IN NONABSORBABLE GRN L36MM CT-1 CX21D

## (undated) DEVICE — RENTAL PROBE PRO ART ROBTIC ULTRASOUND

## (undated) DEVICE — DEVICE SECUREMENT AD W/ TRICOT ANCHR PD FOR F LTX SIL CATH

## (undated) DEVICE — SUTURE MCRYL SZ 4-0 L27IN ABSRB UD L19MM PS-2 1/2 CIR PRIM Y426H